# Patient Record
Sex: FEMALE | Race: WHITE | NOT HISPANIC OR LATINO | Employment: FULL TIME | URBAN - METROPOLITAN AREA
[De-identification: names, ages, dates, MRNs, and addresses within clinical notes are randomized per-mention and may not be internally consistent; named-entity substitution may affect disease eponyms.]

---

## 2017-02-01 ENCOUNTER — OFFICE VISIT (OUTPATIENT)
Dept: ONCOLOGY | Facility: CLINIC | Age: 32
End: 2017-02-01
Attending: GENETIC COUNSELOR, MS
Payer: COMMERCIAL

## 2017-02-01 DIAGNOSIS — Z80.3 FAMILY HISTORY OF MALIGNANT NEOPLASM OF BREAST: ICD-10-CM

## 2017-02-01 DIAGNOSIS — Z80.41 FAMILY HISTORY OF MALIGNANT NEOPLASM OF OVARY: Primary | ICD-10-CM

## 2017-02-01 PROCEDURE — 96040 ZZH GENETIC COUNSELING, EACH 30 MINUTES: CPT | Mod: ZF | Performed by: GENETIC COUNSELOR, MS

## 2017-02-01 NOTE — Clinical Note
Cancer Risk Management  Program Locations    [facility]  [facility]  Mailing Address  Cancer Risk Management Program  [facility]  [address] [place] [address]    New patient appointments  [phone]  February 2, 2017    Celeste Arguello  [address]  [facility] [address]    Dear Celeste,    It was a pleasure meeting with you at the [facility] on 2/1/17.  Here is a copy of the progress note from your recent genetic counseling visit to the Cancer Risk Management Program.  If you have any additional questions, please feel free to call.    Referring Provider:  ENRIQUE Guidry CNM     Presenting Information:   I met with Celeste Saundra today for genetic counseling at the Cancer Risk Management Program at the [facility] to discuss her family history of breast and ovarian cancer.  She is here today to discuss genetic testing for the BRCA1 mutation found in her cousin and other maternal family members.    Personal History:  Celeste is a 31 year old female.  She has not had cancer herself.  She has not had a mammogram. She did have a breast ultrasound in 2009 due to breast pain, but this was benign.  She had a transvaginal ultrasound in 2012 and 2015, which showed a left ovarian cyst.  She has not had a colonoscopy.  She quit smoking, and has only a few alcoholic drinks per year.    Age at first period: 11  Age at first child: 24  Age at menopause: n/a  Years of OCP use: 2   Total years of HRT use: none  Uterus/Ovaries/Fallopian tubes: intact    Family History: (Please see scanned pedigree for detailed family history information). Bao Ricketts, Genetic Counseling Intern, obtained the family tree under my supervision today.    8 year old healthy daughter.    Two brothers,  "ages 34 and 36, no cancer history.    Mother was diagnosed with ovarian cancer at 39, and had chemotherapy and surgery.  She passed away at 48.  Celeste's mother told her that she had genetic testing, and that the results were \"available when she was ready to use them\".  Celeste does not have her mother's test results.    Maternal aunt (had genetic counseling at [facility], Celeste brought the initial consult note) had a prophylactic double-mastectomy in her 40s, but then had Paget's disease of the nipple with IDC that was ER/FL-, HER2+.  Her daughters have had genetic testing for the BRCA1 familial mutation and are negative. This aunt has had genetic testing, but Celeste is not sure what her results were.    Maternal aunt had breast cancer at 43, and is BRCA1 positive. Her daughter also had breast cancer at 43, and is BRCA1 positive.    Maternal uncle  at 43 from pancreatic cancer, diagnosed around age 41.  He did not have genetic testing.  One of his daughters tested negative for the BRCA1 mutation, but his two other children have not been tested.    Two other maternal uncles and a maternal aunt have not had cancer or genetic testing.    Maternal grandmother has passed away, but Scott was not sure how old she was when she . She never had cancer. Her sister did have breast cancer, age of diagnosis is unknown.    Maternal grandfather had colon cancer at 72.    Father had leukemia and passed away at 51. His mother also had leukemia in her 50s.    Her maternal ethnicity is Polish. Her paternal ethnicity is Polish.  Ashkenazi Alevism ancestry was denied. No reported consanguinity.    Discussion:    We discussed that genetic testing for the BRCA1 mutation in her family is certainly available to Celeste. We discussed that mutations in this gene cause increased risk for breast, ovarian, and pancreatic cancer, which are all cancers we see in her family.  We discussed that since a mutation has been found, the " best testing strategy for Celeste is to test her for the specific BRCA1 mutation (single-site testing) in her family.  For this, we would need a positive test report from a family member, so we can verify the name of the mutation.  The genetic counseling note from her aunt that she brought today states that testing in her family members was done at [facility] in 2009, but the report was also not available at the time of her aunt's consultation.     Celeste believes that her mother did have genetic testing before she passed away, but does not have the test report. We discussed that her report would be very helpful to obtain.  If her mother is positive for the BRCA1 mutation, we could demonstrate that this was the reason she developed ovarian cancer, and could proceed with single-site testing for Celeste. However, if she had testing and was negative, we do not have a genetic explanation for her cancer, which could potentially be due to another genetic cause. In that case, more comprehensive testing would be indicated for Celeste.    I will find out from [employer] what information is required to obtain her mother's report. Celeste will contact her cousin and work to obtain a positive test report from another family member. We will be in contact in the near future once this information has been obtained.  Genetic testing will be deferred for now, until a positive test report becomes available.    Plan:  1) I will collect information on how to obtain Celeste's mother's records.  2) Celeste will contact her family members to obtain a genetic test report.   Face to face time: 20 mins    Dara Parra MS, [facility]  Certified Genetic Counselor  P. [phone], F. [phone]

## 2017-02-01 NOTE — Clinical Note
"2/1/2017       RE: Celeste Arguello  [address] [facility] [address]     Dear Colleague,    Thank you for referring your patient, Celeste Arguello, to the [facility]. Please see a copy of my visit note below.    2/1/2017    Referring Provider:  ENRIQUE Guidry CNM     Presenting Information:   I met with Celeste Arguello today for genetic counseling at the Cancer Risk Management Program at the [facility] to discuss her family history of breast and ovarian cancer.  She is here today to discuss genetic testing for the BRCA1 mutation found in her cousin and other maternal family members.    Personal History:  Celeste is a 31 year old female.  She has not had cancer herself.  She has not had a mammogram. She did have a breast ultrasound in 2009 due to breast pain, but this was benign.  She had a transvaginal ultrasound in 2012 and 2015, which showed a left ovarian cyst.  She has not had a colonoscopy.  She quit smoking, and has only a few alcoholic drinks per year.      Age at first period: 11  Age at first child: 24  Age at menopause: n/a  Years of OCP use: 2   Total years of HRT use: none  Uterus/Ovaries/Fallopian tubes: intact    Family History: (Please see scanned pedigree for detailed family history information). Bao Ricketts, Genetic Counseling Intern, obtained the family tree under my supervision today.    8 year old healthy daughter.    Two brothers, ages 34 and 36, no cancer history.    Mother was diagnosed with ovarian cancer at 39, and had chemotherapy and surgery.  She passed away at 48.  Celeste's mother told her that she had genetic testing, and that the results were \"available when she was ready to use them\".  Celeste does not have her mother's test results.    Maternal aunt (had genetic counseling at [facility], Celeste brought the initial consult note) had a prophylactic double-mastectomy in her 40s, but then had Paget's disease of the " nipple with IDC that was ER/DE-, HER2+.  Her daughters have had genetic testing for the BRCA1 familial mutation and are negative. This aunt has had genetic testing, but Celeste is not sure what her results were.    Maternal aunt had breast cancer at 43, and is BRCA1 positive. Her daughter also had breast cancer at 43, and is BRCA1 positive.    Maternal uncle  at 43 from pancreatic cancer, diagnosed around age 41.  He did not have genetic testing.  One of his daughters tested negative for the BRCA1 mutation, but his two other children have not been tested.    Two other maternal uncles and a maternal aunt have not had cancer or genetic testing.    Maternal grandmother has passed away, but Scott was not sure how old she was when she . She never had cancer. Her sister did have breast cancer, age of diagnosis is unknown.    Maternal grandfather had colon cancer at 72.    Father had leukemia and passed away at 51. His mother also had leukemia in her 50s.    Her maternal ethnicity is Polish. Her paternal ethnicity is Polish.  Ashkenazi Jain ancestry was denied. No reported consanguinity.    Discussion:    We discussed that genetic testing for the BRCA1 mutation in her family is certainly available to Celeste. We discussed that mutations in this gene cause increased risk for breast, ovarian, and pancreatic cancer, which are all cancers we see in her family.  We discussed that since a mutation has been found, the best testing strategy for Celeste is to test her for the specific BRCA1 mutation (single-site testing) in her family.  For this, we would need a positive test report from a family member, so we can verify the name of the mutation.  The genetic counseling note from her aunt that she brought today states that testing in her family members was done at [facility] in , but the report was also not available at the time of her aunt's consultation.     Celeste believes that her mother did have genetic  testing before she passed away, but does not have the test report. We discussed that her report would be very helpful to obtain.  If her mother is positive for the BRCA1 mutation, we could demonstrate that this was the reason she developed ovarian cancer, and could proceed with single-site testing for Celeste. However, if she had testing and was negative, we do not have a genetic explanation for her cancer, which could potentially be due to another genetic cause. In that case, more comprehensive testing would be indicated for Celeste.    I will find out from [employer] what information is required to obtain her mother's report. Celeste will contact her cousin and work to obtain a positive test report from another family member. We will be in contact in the near future once this information has been obtained.  Genetic testing will be deferred for now, until a positive test report becomes available.    Plan:  1) I will collect information on how to obtain Celeste's mother's records.  2) Celeste will contact her family members to obtain a genetic test report.     Face to face time: 20 mins    Dara Parra MS, [facility]  Certified Genetic Counselor  P. [phone]  F. [phone]

## 2017-02-01 NOTE — PROGRESS NOTES
"2/1/2017    Referring Provider:  ENRIQUE Guidry CNM     Presenting Information:   I met with Celeste Arguello today for genetic counseling at the Cancer Risk Management Program at the [facility] to discuss her family history of breast and ovarian cancer.  She is here today to discuss genetic testing for the BRCA1 mutation found in her cousin and other maternal family members.    Personal History:  Celetse is a 31 year old female.  She has not had cancer herself.  She has not had a mammogram. She did have a breast ultrasound in 2009 due to breast pain, but this was benign.  She had a transvaginal ultrasound in 2012 and 2015, which showed a left ovarian cyst.  She has not had a colonoscopy.  She quit smoking, and has only a few alcoholic drinks per year.      Age at first period: 11  Age at first child: 24  Age at menopause: n/a  Years of OCP use: 2   Total years of HRT use: none  Uterus/Ovaries/Fallopian tubes: intact    Family History: (Please see scanned pedigree for detailed family history information). Bao Ricketts, Genetic Counseling Intern, obtained the family tree under my supervision today.    8 year old healthy daughter.    Two brothers, ages 34 and 36, no cancer history.    Mother was diagnosed with ovarian cancer at 39, and had chemotherapy and surgery.  She passed away at 48.  Celeste's mother told her that she had genetic testing, and that the results were \"available when she was ready to use them\".  Celeste does not have her mother's test results.    Maternal aunt (had genetic counseling at [facility], Celeste brought the initial consult note) had a prophylactic double-mastectomy in her 40s, but then had Paget's disease of the nipple with IDC that was ER/MS-, HER2+.  Her daughters have had genetic testing for the BRCA1 familial mutation and are negative. This aunt has had genetic testing, but Celeste is not sure what her results were.    Maternal aunt had breast cancer at " 43, and is BRCA1 positive. Her daughter also had breast cancer at 43, and is BRCA1 positive.    Maternal uncle  at 43 from pancreatic cancer, diagnosed around age 41.  He did not have genetic testing.  One of his daughters tested negative for the BRCA1 mutation, but his two other children have not been tested.    Two other maternal uncles and a maternal aunt have not had cancer or genetic testing.    Maternal grandmother has passed away, but Scott was not sure how old she was when she . She never had cancer. Her sister did have breast cancer, age of diagnosis is unknown.    Maternal grandfather had colon cancer at 72.    Father had leukemia and passed away at 51. His mother also had leukemia in her 50s.    Her maternal ethnicity is Polish. Her paternal ethnicity is Polish.  Ashkenazi Orthodox ancestry was denied. No reported consanguinity.    Discussion:    We discussed that genetic testing for the BRCA1 mutation in her family is certainly available to Celeste. We discussed that mutations in this gene cause increased risk for breast, ovarian, and pancreatic cancer, which are all cancers we see in her family.  We discussed that since a mutation has been found, the best testing strategy for Celeste is to test her for the specific BRCA1 mutation (single-site testing) in her family.  For this, we would need a positive test report from a family member, so we can verify the name of the mutation.  The genetic counseling note from her aunt that she brought today states that testing in her family members was done at [facility] in , but the report was also not available at the time of her aunt's consultation.     Celeste believes that her mother did have genetic testing before she passed away, but does not have the test report. We discussed that her report would be very helpful to obtain.  If her mother is positive for the BRCA1 mutation, we could demonstrate that this was the reason she developed ovarian  cancer, and could proceed with single-site testing for Celeste. However, if she had testing and was negative, we do not have a genetic explanation for her cancer, which could potentially be due to another genetic cause. In that case, more comprehensive testing would be indicated for Celeste.    I will find out from [employer] what information is required to obtain her mother's report. Celeste will contact her cousin and work to obtain a positive test report from another family member. We will be in contact in the near future once this information has been obtained.  Genetic testing will be deferred for now, until a positive test report becomes available.    Plan:  1) I will collect information on how to obtain Celeste's mother's records.  2) Celeste will contact her family members to obtain a genetic test report.     Face to face time: 20 mins    Dara Parra MS, [facility]  Certified Genetic Counselor  P. [phone]  F. [phone]

## 2017-02-17 ENCOUNTER — TELEPHONE (OUTPATIENT)
Dept: ONCOLOGY | Facility: CLINIC | Age: 32
End: 2017-02-17

## 2017-02-17 NOTE — TELEPHONE ENCOUNTER
2/17/2017    I called Celeste today to follow up per our last discussion (trying to obtain her mother's test report), but was unable to reach her.  I left a non-detailed voicemail with my name and phone number.    Dara Parra MS, [facility]  Certified Genetic Counselor  P. [phone]  F. [phone]

## 2017-02-22 ENCOUNTER — TELEPHONE (OUTPATIENT)
Dept: ONCOLOGY | Facility: CLINIC | Age: 32
End: 2017-02-22

## 2017-02-22 DIAGNOSIS — Z30.41 ORAL CONTRACEPTIVE USE: ICD-10-CM

## 2017-02-22 DIAGNOSIS — Z80.41 FAMILY HISTORY OF OVARIAN CANCER: ICD-10-CM

## 2017-02-22 DIAGNOSIS — N80.9 ENDOMETRIOSIS: ICD-10-CM

## 2017-02-22 RX ORDER — NORGESTIMATE AND ETHINYL ESTRADIOL 0.25-0.035
KIT ORAL
Qty: 84 TABLET | Refills: 4 | Status: SHIPPED | OUTPATIENT
Start: 2017-02-22 | End: 2018-04-04

## 2017-02-22 NOTE — TELEPHONE ENCOUNTER
Refill request received for OCP . Her last clinic appointment was 2/17/16 and pap was 6/16/14. Refill completed per [facility] RN protocol.

## 2017-02-22 NOTE — TELEPHONE ENCOUNTER
2/22/2017    I called Celeste today to follow up on our previous discussion about getting a positive test report from a family member, but was unable to reach her.  I left a non-detailed voicemail with my name and phone number.    Dara Parra MS, [facility]  Certified Genetic Counselor  P. [phone]  F. [phone]

## 2017-02-23 ENCOUNTER — TELEPHONE (OUTPATIENT)
Dept: ONCOLOGY | Facility: CLINIC | Age: 32
End: 2017-02-23

## 2017-02-23 NOTE — LETTER
Cancer Risk Management  Program Locations    [facility]  [facility]  [facility]  Mailing Address  Cancer Risk Management Program  [facility]  [address] [place] [address]    New patient appointments  [phone]  February 23, 2017    Celeste Arguello  [address]  [address]  [facility] [address]      Dear Klaudia,    It was good to talk to you the other day on the phone.  Here is the paperwork for you uncle (executor of your mother's estate) to fill out.     I can obtain the records for you once we have the following information:  1. Attached form, signed by your uncle  2. Proof that he is executor of your mother's estate  3. A copy of your mother's death certificate.    Once you have this information, you can mail it back to me at the following address:  Dara Parra  [address] [place] [address]  [address] [place] [id number]    Please let me know if you have questions.  My office phone number is [phone].    Sincerely,        Dara Parra MS, [facility]  Certified Genetic Counselor  P. [phone]  F. [phone]

## 2017-03-16 ENCOUNTER — OFFICE VISIT (OUTPATIENT)
Dept: FAMILY MEDICINE | Facility: CLINIC | Age: 32
End: 2017-03-16
Payer: COMMERCIAL

## 2017-03-16 VITALS
SYSTOLIC BLOOD PRESSURE: 113 MMHG | HEART RATE: 65 BPM | DIASTOLIC BLOOD PRESSURE: 71 MMHG | TEMPERATURE: 98.5 F | HEIGHT: 62 IN | BODY MASS INDEX: 21.71 KG/M2 | OXYGEN SATURATION: 96 % | WEIGHT: 118 LBS

## 2017-03-16 DIAGNOSIS — H81.10 BPPV (BENIGN PAROXYSMAL POSITIONAL VERTIGO), UNSPECIFIED LATERALITY: Primary | ICD-10-CM

## 2017-03-16 DIAGNOSIS — Z72.0 TOBACCO ABUSE: ICD-10-CM

## 2017-03-16 PROCEDURE — 99213 OFFICE O/P EST LOW 20 MIN: CPT | Performed by: NURSE PRACTITIONER

## 2017-03-16 RX ORDER — MECLIZINE HYDROCHLORIDE 25 MG/1
25 TABLET ORAL 3 TIMES DAILY PRN
COMMUNITY
Start: 2017-03-13 | End: 2019-03-25

## 2017-03-16 RX ORDER — VARENICLINE TARTRATE 1 MG/1
1 TABLET, FILM COATED ORAL 2 TIMES DAILY
Qty: 56 TABLET | Refills: 2 | Status: SHIPPED | OUTPATIENT
Start: 2017-03-16 | End: 2018-04-26

## 2017-03-16 RX ORDER — ONDANSETRON 4 MG/1
4 TABLET, ORALLY DISINTEGRATING ORAL EVERY 12 HOURS PRN
COMMUNITY
Start: 2017-03-13 | End: 2019-03-25

## 2017-03-16 ASSESSMENT — ANXIETY QUESTIONNAIRES
2. NOT BEING ABLE TO STOP OR CONTROL WORRYING: NOT AT ALL
GAD7 TOTAL SCORE: 2
IF YOU CHECKED OFF ANY PROBLEMS ON THIS QUESTIONNAIRE, HOW DIFFICULT HAVE THESE PROBLEMS MADE IT FOR YOU TO DO YOUR WORK, TAKE CARE OF THINGS AT HOME, OR GET ALONG WITH OTHER PEOPLE: NOT DIFFICULT AT ALL
6. BECOMING EASILY ANNOYED OR IRRITABLE: SEVERAL DAYS
7. FEELING AFRAID AS IF SOMETHING AWFUL MIGHT HAPPEN: NOT AT ALL
5. BEING SO RESTLESS THAT IT IS HARD TO SIT STILL: SEVERAL DAYS
3. WORRYING TOO MUCH ABOUT DIFFERENT THINGS: NOT AT ALL
1. FEELING NERVOUS, ANXIOUS, OR ON EDGE: NOT AT ALL

## 2017-03-16 ASSESSMENT — PATIENT HEALTH QUESTIONNAIRE - PHQ9: 5. POOR APPETITE OR OVEREATING: NOT AT ALL

## 2017-03-16 NOTE — MR AVS SNAPSHOT
After Visit Summary   3/16/2017    Celeste Arguello    MRN: [id number]           Patient Information     Date Of Birth          1985        Visit Information        Provider Department      3/16/2017 9:40 AM Carolyn San APRN CNP [facility]        Today's Diagnoses     BPPV (benign paroxysmal positional vertigo), unspecified laterality    -  1    Tobacco abuse          Care Instructions    [facility]    If you have any questions regarding to your visit please contact your care team:       Team Red:   Clinic Hours Telephone Number   Dr. Sarah San, NP   7am-7pm  Monday - Thursday   7am-5pm  Fridays  [phone]  (Appointment scheduling available 24/7)    Questions about your visit?   Team Line  [phone]   Urgent Care - [place] [facility] - 11am-9pm Monday-Friday Saturday-Sunday- 9am-5pm   [place] - 5pm-9pm Monday-Friday Saturday-Sunday- 9am-5pm  [phone] - [facility]  [phone] - [place]       What options do I have for visits at the clinic other than the traditional office visit?  To expand how we care for you, many of our providers are utilizing electronic visits (e-visits) and telephone visits, when medically appropriate, for interactions with their patients rather than a visit in the clinic.   We also offer nurse visits for many medical concerns. Just like any other service, we will bill your insurance company for this type of visit based on time spent on the phone with your provider. Not all insurance companies cover these visits. Please check with your medical insurance if this type of visit is covered. You will be responsible for any charges that are not paid by your insurance.      E-visits via FUZE Fit For A Kid!:  generally incur a $35.00 fee.  Telephone visits:  Time spent on the phone: *charged based on time that is spent on the phone in increments of 10 minutes.  Estimated cost:   5-10 mins $30.00   11-20 mins. $59.00   21-30 mins. $85.00     Use KartMehart (secure email communication and access to your chart) to send your primary care provider a message or make an appointment. Ask someone on your Team how to sign up for [employer].  For a Price Quote for your services, please call our [employer] Price Line at [phone].      As always, Thank you for trusting us with your health care needs!    Discharged by Anel Li MA.          Follow-ups after your visit        Additional Services     [facility] PT, HAND, AND CHIROPRACTIC REFERRAL       === This order will print in the [facility] Scheduling  Office ===    Physical therapy, hand therapy and chiropractic care are available through:    [facility]  [place] [facility]    Call one easy number to schedule at any of the above locations:  [phone].    Your provider has referred you to Physical Therapy at [facility] or [facility]    Indication/Reason for Referral: BPPV  Onset of Illness:  acute  Therapy Orders:  Evaluate and Treat  Special Programs:  None  Special Request:  None    Taylor Vazquez      Additional Comments for the therapist or chiropractor:      Please be aware that coverage of these services is subject to the terms and limitations of your health insurance plan.  Call member services at your health plan with any benefit or coverage questions.      Please bring the following to your appointment:    >>   Your personal calendar for scheduling future appointments  >>   Comfortable clothing                  Who to contact     If you have questions or need follow up information about today's clinic visit or your schedule please contact [facility] directly at [phone].  Normal or non-critical lab and imaging results will be communicated to you by MyChart, letter or phone within 4 business days after the clinic has received the results. If you do not hear from us within  "7 days, please contact the clinic through Social Tools or phone. If you have a critical or abnormal lab result, we will notify you by phone as soon as possible.  Submit refill requests through Social Tools or call your pharmacy and they will forward the refill request to us. Please allow 3 business days for your refill to be completed.          Additional Information About Your Visit        [employer] Information     Social Tools gives you secure access to your electronic health record. If you see a primary care provider, you can also send messages to your care team and make appointments. If you have questions, please call your primary care clinic.  If you do not have a primary care provider, please call [phone] and they will assist you.        Care EveryWhere ID     This is your Care EveryWhere ID. This could be used by other organizations to access your [place] medical records  [id number]        Your Vitals Were     Pulse Temperature Height Pulse Oximetry Breastfeeding? BMI (Body Mass Index)    65 98.5  F (36.9  C) (Oral) 5' 2\" (1.575 m) 96% No 21.58 kg/m2       Blood Pressure from Last 3 Encounters:   03/16/17 113/71   12/22/16 117/77   10/26/16 108/63    Weight from Last 3 Encounters:   03/16/17 118 lb (53.5 kg)   12/22/16 115 lb 9.6 oz (52.4 kg)   10/26/16 115 lb (52.2 kg)              We Performed the Following     [facility] PT, HAND, AND CHIROPRACTIC REFERRAL          Today's Medication Changes          These changes are accurate as of: 3/16/17 10:26 AM.  If you have any questions, ask your nurse or doctor.               Start taking these medicines.        Dose/Directions    varenicline 1 MG tablet   Commonly known as:  CHANTIX   Used for:  Tobacco abuse   Started by:  Carolyn San APRN CNP        Dose:  1 mg   Take 1 tablet (1 mg) by mouth 2 times daily   Quantity:  56 tablet   Refills:  2            Where to get your medicines      These medications were sent to [facility] [id number] [place], [place] - [address] [facility]  [address] [address]     Phone:  [phone]     varenicline 1 MG tablet                Primary Care Provider Office Phone # Fax #    ENRIQUE Zavala FERN [phone]       [facility] [address]  [facility] [address]        Thank you!     Thank you for choosing [facility]  for your care. Our goal is always to provide you with excellent care. Hearing back from our patients is one way we can continue to improve our services. Please take a few minutes to complete the written survey that you may receive in the mail after your visit with us. Thank you!             Your Updated Medication List - Protect others around you: Learn how to safely use, store and throw away your medicines at www.disposemymeds.org.          This list is accurate as of: 3/16/17 10:26 AM.  Always use your most recent med list.                   Brand Name Dispense Instructions for use    citalopram 20 MG tablet    celeXA    90 tablet    Take 1 tablet (20 mg) by mouth daily       meclizine 25 MG tablet    ANTIVERT     Take 25 mg by mouth 3 times daily as needed       MIRENA (52 MG) 20 MCG/24HR IUD   Generic drug:  levonorgestrel      1 Device by Intrauterine route Was inserted S 2-       norgestimate-ethinyl estradiol 0.25-35 MG-MCG per tablet    ORTHO-CYCLEN, SPRINTEC    84 tablet    Take active pill daily.       ondansetron 4 MG ODT tab    ZOFRAN-ODT     Take 4 mg by mouth every 12 hours       varenicline 1 MG tablet    CHANTIX    56 tablet    Take 1 tablet (1 mg) by mouth 2 times daily

## 2017-03-16 NOTE — NURSING NOTE
"Chief Complaint   Patient presents with     Hospital F/U       Initial /71 (BP Location: Left arm, Patient Position: Chair, Cuff Size: Adult Regular)  Pulse 65  Temp 98.5  F (36.9  C) (Oral)  Ht 5' 2\" (1.575 m)  Wt 118 lb (53.5 kg)  SpO2 96%  Breastfeeding? No  BMI 21.58 kg/m2 Estimated body mass index is 21.58 kg/(m^2) as calculated from the following:    Height as of this encounter: 5' 2\" (1.575 m).    Weight as of this encounter: 118 lb (53.5 kg).  Medication Reconciliation: complete   Mary EDEN MA      "

## 2017-03-16 NOTE — PATIENT INSTRUCTIONS
[facility]    If you have any questions regarding to your visit please contact your care team:       Team Red:   Clinic Hours Telephone Number   Dr. Sarah San, NP   7am-7pm  Monday - Thursday   7am-5pm  Fridays  [phone]  (Appointment scheduling available 24/7)    Questions about your visit?   Team Line  [phone]   [facility] [place] [facility] 11am-9pm Monday-Friday Saturday-Sunday- 9am-5pm   [place] - 5pm-9pm Monday-Friday Saturday-Sunday- 9am-5pm  [phone] - [place]   [phone] - [place]       What options do I have for visits at the clinic other than the traditional office visit?  To expand how we care for you, many of our providers are utilizing electronic visits (e-visits) and telephone visits, when medically appropriate, for interactions with their patients rather than a visit in the clinic.   We also offer nurse visits for many medical concerns. Just like any other service, we will bill your insurance company for this type of visit based on time spent on the phone with your provider. Not all insurance companies cover these visits. Please check with your medical insurance if this type of visit is covered. You will be responsible for any charges that are not paid by your insurance.      E-visits via fitaborate:  generally incur a $35.00 fee.  Telephone visits:  Time spent on the phone: *charged based on time that is spent on the phone in increments of 10 minutes. Estimated cost:   5-10 mins $30.00   11-20 mins. $59.00   21-30 mins. $85.00     Use "[employer]t (secure email communication and access to your chart) to send your primary care provider a message or make an appointment. Ask someone on your Team how to sign up for fitaborate.  For a Price Quote for your services, please call our Consumer Price Line at [phone].      As always, Thank you for trusting us with your health care needs!    Discharged  by Anel Li MA.

## 2017-03-16 NOTE — PROGRESS NOTES
SUBJECTIVE:                                                    Celeste Arguello is a 31 year old female who presents to clinic today for the following health issues:      ED/UC Followup:    Facility:  [facility]  Date of visit: 3-12-17  Reason for visit: Vertigo  Current Status: improved     Had an episode of dizziness and vomiting in the shower, went to Emergency Room and diagnosed with vertigo. Has had some dizziness since then- worse in shower and with quick head motions- but much better overall. Has taken Meclizine with some relief- didn't take today and feels more dizzy.    Problem list and histories reviewed & adjusted, as indicated.  Additional history: as documented    Patient Active Problem List   Diagnosis     Mother  of ovarian cancer, age 47     Vitamin d deficiency 23     Female genital symptoms     Encounter for routine gynecological examination     Endometriosis     Mirena IUD (intrauterine device) in place     Oral contraceptive use -- for cyst control     Past Surgical History   Procedure Laterality Date     Urethral reimplant  age 6     Laparoscopy diagnostic (gyn)       endometriosis       Social History   Substance Use Topics     Smoking status: Former Smoker     Quit date: 3/11/2017     Smokeless tobacco: Never Used      Comment: quit for 2 months now     Alcohol use Yes      Comment: 1wine 1x/yr max     Family History   Problem Relation Age of Onset     Ovarian Cancer Mother 38      age 47     Hereditary Breast and Ovarian Cancer Syndrome Maternal Aunt 50     Breast cancer gene -- MEHRDAD/BSO and mastectomy     Pancreatic Cancer Maternal Uncle 38     Alcohol/Drug Brother      active     Alcohol/Drug Brother      recovering     Psychotic Disorder Brother      depression and ADHD     Breast Cancer Maternal Aunt 35     & MC age 36     Uterine Cancer Maternal Aunt      C.A.D. No family hx of      DIABETES No family hx of      Hypertension No family hx of      Coronary Artery  "Disease No family hx of      CEREBROVASCULAR DISEASE No family hx of      Hyperlipidemia No family hx of      Colon Cancer No family hx of      Prostate Cancer No family hx of      Thyroid Disease No family hx of            Reviewed and updated as needed this visit by clinical staff  Tobacco  Allergies  Meds  Med Hx  Surg Hx  Fam Hx  Soc Hx      Reviewed and updated as needed this visit by Provider         ROS:  Constitutional, HEENT, cardiovascular, pulmonary, neuro systems are negative, except as otherwise noted.    OBJECTIVE:                                                    /71 (BP Location: Left arm, Patient Position: Chair, Cuff Size: Adult Regular)  Pulse 65  Temp 98.5  F (36.9  C) (Oral)  Ht 5' 2\" (1.575 m)  Wt 118 lb (53.5 kg)  SpO2 96%  Breastfeeding? No  BMI 21.58 kg/m2  Body mass index is 21.58 kg/(m^2).  GENERAL: healthy, alert and no distress  EYES: Eyes grossly normal to inspection, PERRL and conjunctivae and sclerae normal  HENT: ear canals and TM's normal, nose and mouth without ulcers or lesions  NECK: no adenopathy, no asymmetry, masses, or scars and thyroid normal to palpation  RESP: lungs clear to auscultation - no rales, rhonchi or wheezes  CV: regular rates and rhythm, normal S1 S2, no S3 or S4 and no murmur, click or rub  NEURO: Normal strength and tone, sensory exam grossly normal, mentation intact, gait normal including heel/toe/tandem walking and Romberg normal    Diagnostic Test Results:  none      ASSESSMENT/PLAN:                                                      1. BPPV (benign paroxysmal positional vertigo), unspecified laterality  Continue Meclizine, see Physical Therapy for Epley maneuvers  - [facility] PT, HAND, AND CHIROPRACTIC REFERRAL    2. Tobacco abuse  Had quit for several months then started again- Chantix helpful and has been taking for the last week or so. Rx refilled today.  - varenicline (CHANTIX) 1 MG tablet; Take 1 tablet (1 mg) by mouth 2 times daily  " Dispense: 56 tablet; Refill: 2    Follow up as needed    ENRIQUE Renae CNP  [facility]

## 2017-03-17 ENCOUNTER — THERAPY VISIT (OUTPATIENT)
Dept: PHYSICAL THERAPY | Facility: CLINIC | Age: 32
End: 2017-03-17
Payer: COMMERCIAL

## 2017-03-17 DIAGNOSIS — H81.10 BPPV (BENIGN PAROXYSMAL POSITIONAL VERTIGO), UNSPECIFIED LATERALITY: Primary | ICD-10-CM

## 2017-03-17 PROCEDURE — 97112 NEUROMUSCULAR REEDUCATION: CPT | Mod: GP | Performed by: PHYSICAL THERAPIST

## 2017-03-17 PROCEDURE — 97161 PT EVAL LOW COMPLEX 20 MIN: CPT | Mod: GP | Performed by: PHYSICAL THERAPIST

## 2017-03-17 ASSESSMENT — PATIENT HEALTH QUESTIONNAIRE - PHQ9: SUM OF ALL RESPONSES TO PHQ QUESTIONS 1-9: 2

## 2017-03-17 ASSESSMENT — ANXIETY QUESTIONNAIRES: GAD7 TOTAL SCORE: 2

## 2017-03-17 NOTE — PROGRESS NOTES
Subjective:    HPI                    Objective:    System    Physical Exam    General     ROS  S:  Celeste is a 31 year old patient complaining of  dizziness.  Onset of symptoms: 5 days ago 3/11      Is this a recurrent problem: yes dont feel right   Progression since onset: same   Frequency of episodes/time of day: few   Duration of episodes: brief   Exacerbating factors: looking down, rolling over in bed   Relieving factors: rest/stillness   Previous treatments:  None   Special tests/diagnostics performed: none   Significant medical hx: motion sickness all of her life   Meds: none   Occupation: mental health therapist    Work requirements: none   General health reported by patient: good   Red Flags: none       O:  Cervical ROM screen: neg   Oculomotor/gaze stability screen: +saccodes   Vertebral artery test: neg   Hallpike-Plymouth maneuver:  R: neg   L: neg   Horizontal canal test:  R: neg   L: neg   Balance testing:  NA       Assessment/Plan:      Patient is a 31 year old female with vestibular complaints.    Patient has the following significant findings with corresponding treatment plan.                Diagnosis 1:  BPPV  Impaired balance - neuro re-education, therapeutic activities and CRM     Therapy Evaluation Codes:   1) History comprised of:   Personal factors that impact the plan of care:      None.    Comorbidity factors that impact the plan of care are:      None.     Medications impacting care: None.  2) Examination of Body Systems comprised of:   Body structures and functions that impact the plan of care:      vestibular .   Activity limitations that impact the plan of care are:      Bending.  3) Clinical presentation characteristics are:   Stable/Uncomplicated.  4) Decision-Making    Low complexity using standardized patient assessment instrument and/or measureable assessment of functional outcome.  Cumulative Therapy Evaluation is: Low complexity.    Previous and current functional limitations:  (See  Goal Flow Sheet for this information)    Short term and Long term goals: (See Goal Flow Sheet for this information)     Communication ability:  Patient appears to be able to clearly communicate and understand verbal and written communication and follow directions correctly.  Treatment Explanation - The following has been discussed with the patient:   RX ordered/plan of care  Anticipated outcomes  Possible risks and side effects  This patient would benefit from PT intervention to resume normal activities.   Rehab potential is good.    Frequency:  1 X week, once daily  Duration:  for 4 weeks  Discharge Plan:  Achieve all LTG.  Independent in home treatment program.  Reach maximal therapeutic benefit.    Please refer to the daily flowsheet for treatment today, total treatment time and time spent performing 1:1 timed codes.

## 2017-03-17 NOTE — MR AVS SNAPSHOT
After Visit Summary   3/17/2017    Celeste Arguello    MRN: [id number]           Patient Information     Date Of Birth          1985        Visit Information        Provider Department      3/17/2017 3:20 PM Aquilino Simon PT [facility]        Today's Diagnoses     BPPV (benign paroxysmal positional vertigo), unspecified laterality    -  1       Follow-ups after your visit        Who to contact     If you have questions or need follow up information about today's clinic visit or your schedule please contact [facility] directly at [phone].  Normal or non-critical lab and imaging results will be communicated to you by Michael Biekerhart, letter or phone within 4 business days after the clinic has received the results. If you do not hear from us within 7 days, please contact the clinic through Michael Biekerhart or phone. If you have a critical or abnormal lab result, we will notify you by phone as soon as possible.  Submit refill requests through POTATOSOFT or call your pharmacy and they will forward the refill request to us. Please allow 3 business days for your refill to be completed.          Additional Information About Your Visit        MyChart Information     POTATOSOFT gives you secure access to your electronic health record. If you see a primary care provider, you can also send messages to your care team and make appointments. If you have questions, please call your primary care clinic.  If you do not have a primary care provider, please call [phone] and they will assist you.        Care EveryWhere ID     This is your Care EveryWhere ID. This could be used by other organizations to access your [facility] medical records  [id number]         Blood Pressure from Last 3 Encounters:   03/16/17 113/71   12/22/16 117/77   10/26/16 108/63    Weight from Last 3 Encounters:   03/16/17 53.5 kg (118 lb)   12/22/16 52.4 kg (115 lb 9.6 oz)   10/26/16 52.2 kg (115 lb)              We Performed the  Following     HC PT EVAL, LOW COMPLEXITY     POOL INITIAL EVAL REPORT     NEUROMUSCULAR RE-EDUCATION        Primary Care Provider Office Phone # Fax #    ENRIQUE Zavala FERN [phone]       [facility] [address] [facility] [address]  [facility] [address]        Thank you!     Thank you for choosing [facility]  for your care. Our goal is always to provide you with excellent care. Hearing back from our patients is one way we can continue to improve our services. Please take a few minutes to complete the written survey that you may receive in the mail after your visit with us. Thank you!             Your Updated Medication List - Protect others around you: Learn how to safely use, store and throw away your medicines at www.disposemymeds.org.          This list is accurate as of: 3/17/17  4:42 PM.  Always use your most recent med list.                   Brand Name Dispense Instructions for use    citalopram 20 MG tablet    celeXA    90 tablet    Take 1 tablet (20 mg) by mouth daily       meclizine 25 MG tablet    ANTIVERT     Take 25 mg by mouth 3 times daily as needed       MIRENA (52 MG) 20 MCG/24HR IUD   Generic drug:  levonorgestrel      1 Device by Intrauterine route Was inserted [employer]  2-       norgestimate-ethinyl estradiol 0.25-35 MG-MCG per tablet    ORTHO-CYCLEN, SPRINTEC    84 tablet    Take active pill daily.       ondansetron 4 MG ODT tab    ZOFRAN-ODT     Take 4 mg by mouth every 12 hours       varenicline 1 MG tablet    CHANTIX    56 tablet    Take 1 tablet (1 mg) by mouth 2 times daily

## 2017-03-23 ENCOUNTER — TELEPHONE (OUTPATIENT)
Dept: ONCOLOGY | Facility: CLINIC | Age: 32
End: 2017-03-23

## 2017-03-23 NOTE — TELEPHONE ENCOUNTER
3/23/2017    I called Celeste today to find out if she has talked to her uncle about singing a release to obtain her mother's test report, but was unable to reach her.  I left a non-detailed voicemail with my name and phone number.    Dara Parra MS, [facility]  Certified Genetic Counselor  P. [phone]  F. [phone]

## 2017-04-11 ENCOUNTER — TELEPHONE (OUTPATIENT)
Dept: ONCOLOGY | Facility: CLINIC | Age: 32
End: 2017-04-11

## 2017-04-11 NOTE — TELEPHONE ENCOUNTER
4/11/2017    I called Celeste today to find out if she was able to get the release signed by her uncle to obtain her mother's test report, but was unable to reach her.  I left a non-detailed voicemail with my name and phone number.    Dara Parra MS, [facility]  Certified Genetic Counselor  P. [phone]  F. [phone]

## 2017-04-12 ENCOUNTER — TELEPHONE (OUTPATIENT)
Dept: ONCOLOGY | Facility: CLINIC | Age: 32
End: 2017-04-12

## 2017-04-12 NOTE — TELEPHONE ENCOUNTER
4/12/2017    Klaudia called back today, and she got her uncle (who lives in [place] seasonally) to sign the paperwork to release her mother's records. However, paperwork showing that he is the executor of her mother's estate is also required. The paperwork is at her uncle's permanent residence in [place], and he will be moving back for the summer months in May. Klaudia will also check some records that she has at home, and will let me know if she is able to find anything. Her uncle mailed me the release.  We will be in touch in a few weeks.    Dara Parra MS, [facility]  Certified Genetic Counselor  P. [phone]  F. [phone]

## 2017-05-10 ENCOUNTER — TELEPHONE (OUTPATIENT)
Dept: ONCOLOGY | Facility: CLINIC | Age: 32
End: 2017-05-10

## 2017-05-10 NOTE — TELEPHONE ENCOUNTER
5/10/2017    I called Celeste today to find out if she had received anything from her uncle, as I have not received anything by mail from him yet, but was unable to reach her.  I left a voicemail with my name and phone number.    Dara Parra MS, [facility]  Certified Genetic Counselor  P. [phone]  F. [phone]

## 2017-05-18 ENCOUNTER — TELEPHONE (OUTPATIENT)
Dept: ONCOLOGY | Facility: CLINIC | Age: 32
End: 2017-05-18

## 2017-05-18 NOTE — TELEPHONE ENCOUNTER
5/18/2017    I called Celeste again today to find out if she had received anything from her uncle, as I have not received anything by mail from him yet, but was unable to reach her. I left a voicemail with my name and phone number.     Dara Parra MS, [facility]  Certified Genetic Counselor  P. [phone]  F. [phone]

## 2017-05-18 NOTE — TELEPHONE ENCOUNTER
5/18/2017    I called Celeste today to let her know that I just received the documentation from her uncle by mail, but was unable to reach her.  I left a voicemail letting her know I got this information, and left my name and phone number.    Dara Parra MS, [facility]  Certified Genetic Counselor  P. [phone]  F. [phone]

## 2017-06-08 ENCOUNTER — TELEPHONE (OUTPATIENT)
Dept: ONCOLOGY | Facility: CLINIC | Age: 32
End: 2017-06-08

## 2017-06-08 NOTE — TELEPHONE ENCOUNTER
6/8/2017    I called Celeste today to let her know that I heard back from [facility] after submitting the release of information, and her mother was never tested there.  She hasn't had good luck getting a test report from other relatives due to family dynamics.  We discussed that I am happy to see her in clinic for testing anyway, and that I will have the schedulers call her to set up an appointment.    Dara Parra MS, [facility] [place]  Certified Genetic Counselor  P. [phone]  F. [phone]

## 2017-07-20 ENCOUNTER — DOCUMENTATION ONLY (OUTPATIENT)
Dept: ONCOLOGY | Facility: CLINIC | Age: 32
End: 2017-07-20

## 2017-07-20 NOTE — LETTER
Cancer Risk Management  Program Locations    [facility]  [facility]  [facility]  Mailing Address  Cancer Risk Management Program  [facility]  [address] [place] [address]    New patient appointments  [phone]  July 20, 2017    Celeste Arguello  [address]  [address]  [facility] [place] [address]      Dear Celeste,    I hope you are well. Unfortunately, our schedulers have been unable to reach you to schedule another appointment with me. I know that we all have busy times in our lives, and I can understand if now is not a good time for you to pursue genetic counseling.    However, I do want to make sure you are getting the best medical care possible.  I would encourage you to discuss your family history of cancer with your primary physician.  I have also put a list of screening recommendations based on your family history of cancer. Certainly, these recommendations may change with time, or if you undergo genetic testing.    Screening Recommendations:    Due to your family history of ovarian cancer, you remain at slightly increased risk for ovarian cancer.  There is available ovarian cancer screening (pelvic exams, CA-125 blood tests, and transvaginal ultrasounds), however this screening has significant limitations.  As such, this screening is not typically recommended.  However, I would encourage you to discuss this screening and the signs and symptoms of ovarian cancer with your primary OB/GYN provider, as he or she may have individualized recommendations for you.    Based on your family history, you have a 31.8% lifetime risk of developing breast cancer based on the DAPHNIE (v8) model.  As such, you meet current National Comprehensive Cancer Network (NCCN) guidelines for high risk breast screening.      This includes annual  breast MRI in addition to annual mammogram beginning at age 33 (10 years before the earliest diagnosis in your family).      In addition, you should be receiving clinical breast exams by your physician.      You may participate in our Cancer Risk Management Program in which our clinical nurse specialist provides an individual screening plan and assists with medical management.  If you are interested in this, I can place a referral for you.  Additionally, I encourage to share this information with your primary care provider    Other population cancer screening, such as recommendations by the American Cancer Society, are also appropriate for you.  These screening recommendations may change if there are changes to your personal and/or family history, or if you undergo genetic testing.  Final screening recommendations should be made by each individual's managing physician.    Please let me know if you have any questions. I can be reached at [phone].  If you would like to schedule an appointment with me, please call [phone].     I hope to hear from you soon,     Dara Parra MS, [facility] [place]   Certified Genetic Counselor   P. [phone]

## 2017-07-20 NOTE — PROGRESS NOTES
7/20/2017    Letter sent to patient with instructions to reschedule, which also includes screening recommendations. See letters tab, letter dated 7/20/2017    Dara Parra MS, [facility] [place]  Certified Genetic Counselor  P. [phone]  F. [phone]

## 2017-07-20 NOTE — PROGRESS NOTES
7/20/2017    We have been unable to reach Celeste on multiple accounts to schedule a follow-up appointment. Letter sent to patient with screening recommendations and instructions to reschedule. See letters tab dated 7/20/2017.    Dara Parra MS, [facility] [place]  Certified Genetic Counselor  P. [phone]  F. [phone]

## 2017-08-28 PROBLEM — H81.10 BPPV (BENIGN PAROXYSMAL POSITIONAL VERTIGO), UNSPECIFIED LATERALITY: Status: RESOLVED | Noted: 2017-03-17 | Resolved: 2017-08-28

## 2017-08-28 NOTE — PROGRESS NOTES
Subjective:    HPI                    Objective:    System    Physical Exam    General     ROS    Assessment/Plan:      DISCHARGE REPORT    Progress reporting period is from 3.17 to 8.25.17.     SUBJECTIVE  Subjective: feel not right                ;   ,     Patient has failed to return to therapy so current objective findings are unknown.    OBJECTIVE  Objective: +saccodes, Neg HPD neg Roll bilat       ASSESSMENT/PLAN  Updated problem list and treatment plan: Diagnosis 1:  BPPV     STG/LTGs have been met or progress has been made towards goals:  None  Assessment of Progress: Patient has not returned to therapy.  Current status is unknown and discharge G code cannot be reported.  Self Management Plans:  Patient has been instructed in a home treatment program.  Patient  has been instructed in self management of symptoms.    Celeste continues to require the following intervention to meet STG and LTG's:   The patient failed to complete scheduled/ordered appointments so current information is unknown.  We will discharge this patient from PT.    Recommendations:      Please refer to the daily flowsheet for treatment today, total treatment time and time spent performing 1:1 timed codes.

## 2017-09-03 ENCOUNTER — HEALTH MAINTENANCE LETTER (OUTPATIENT)
Age: 32
End: 2017-09-03

## 2018-01-19 ENCOUNTER — E-VISIT (OUTPATIENT)
Dept: FAMILY MEDICINE | Facility: CLINIC | Age: 33
End: 2018-01-19
Payer: COMMERCIAL

## 2018-01-19 DIAGNOSIS — N30.00 ACUTE CYSTITIS WITHOUT HEMATURIA: Primary | ICD-10-CM

## 2018-01-19 PROCEDURE — 99444 ZZC PHYSICIAN ONLINE EVALUATION & MANAGEMENT SERVICE: CPT | Performed by: NURSE PRACTITIONER

## 2018-01-19 RX ORDER — NITROFURANTOIN 25; 75 MG/1; MG/1
100 CAPSULE ORAL 2 TIMES DAILY
Qty: 10 CAPSULE | Refills: 0 | Status: SHIPPED | OUTPATIENT
Start: 2018-01-19 | End: 2018-01-24

## 2018-03-21 ENCOUNTER — TELEPHONE (OUTPATIENT)
Dept: OBGYN | Facility: CLINIC | Age: 33
End: 2018-03-21

## 2018-03-21 NOTE — TELEPHONE ENCOUNTER
Received refill request for OCP. Due for annual with PAP. Left message for patient to call and schedule.

## 2018-04-04 DIAGNOSIS — N80.9 ENDOMETRIOSIS: ICD-10-CM

## 2018-04-04 DIAGNOSIS — Z80.41 FAMILY HISTORY OF OVARIAN CANCER: ICD-10-CM

## 2018-04-04 DIAGNOSIS — Z30.41 ORAL CONTRACEPTIVE USE: ICD-10-CM

## 2018-04-04 RX ORDER — NORGESTIMATE AND ETHINYL ESTRADIOL 0.25-0.035
KIT ORAL
Qty: 28 TABLET | Refills: 0 | Status: SHIPPED | OUTPATIENT
Start: 2018-04-04 | End: 2018-04-26

## 2018-04-04 NOTE — TELEPHONE ENCOUNTER
Received refill request for OCP.  Last in clinic 12/2016, last pap 6/2014 so due for pap.    Tried to reach [facility] but received voicemail.  Left message that refill request was received and a one month supply can be sent to pharmacy but office visit is due for further refills. Please call [phone] to schedule.

## 2018-04-26 ENCOUNTER — OFFICE VISIT (OUTPATIENT)
Dept: OBGYN | Facility: CLINIC | Age: 33
End: 2018-04-26
Attending: ADVANCED PRACTICE MIDWIFE
Payer: COMMERCIAL

## 2018-04-26 VITALS
WEIGHT: 119 LBS | HEART RATE: 73 BPM | DIASTOLIC BLOOD PRESSURE: 78 MMHG | BODY MASS INDEX: 21.9 KG/M2 | SYSTOLIC BLOOD PRESSURE: 125 MMHG | HEIGHT: 62 IN

## 2018-04-26 DIAGNOSIS — Z12.4 SCREENING FOR MALIGNANT NEOPLASM OF CERVIX: ICD-10-CM

## 2018-04-26 DIAGNOSIS — N80.9 ENDOMETRIOSIS: ICD-10-CM

## 2018-04-26 DIAGNOSIS — Z00.00 VISIT FOR PREVENTIVE HEALTH EXAMINATION: ICD-10-CM

## 2018-04-26 DIAGNOSIS — Z13.29 SCREENING FOR THYROID DISORDER: ICD-10-CM

## 2018-04-26 DIAGNOSIS — Z80.41 FAMILY HISTORY OF OVARIAN CANCER: ICD-10-CM

## 2018-04-26 DIAGNOSIS — Z97.5 IUD (INTRAUTERINE DEVICE) IN PLACE: Primary | ICD-10-CM

## 2018-04-26 DIAGNOSIS — F43.22 ADJUSTMENT DISORDER WITH ANXIOUS MOOD: ICD-10-CM

## 2018-04-26 DIAGNOSIS — Z30.41 ORAL CONTRACEPTIVE USE: ICD-10-CM

## 2018-04-26 DIAGNOSIS — Z01.419 ENCOUNTER FOR GYNECOLOGICAL EXAMINATION WITHOUT ABNORMAL FINDING: ICD-10-CM

## 2018-04-26 PROCEDURE — G0145 SCR C/V CYTO,THINLAYER,RESCR: HCPCS | Performed by: ADVANCED PRACTICE MIDWIFE

## 2018-04-26 PROCEDURE — G0463 HOSPITAL OUTPT CLINIC VISIT: HCPCS | Mod: ZF

## 2018-04-26 PROCEDURE — 87624 HPV HI-RISK TYP POOLED RSLT: CPT | Performed by: ADVANCED PRACTICE MIDWIFE

## 2018-04-26 RX ORDER — NORGESTIMATE AND ETHINYL ESTRADIOL 0.25-0.035
KIT ORAL
Qty: 84 TABLET | Refills: 3 | Status: ON HOLD | OUTPATIENT
Start: 2018-04-26 | End: 2018-09-12

## 2018-04-26 RX ORDER — KETOCONAZOLE 20 MG/ML
SHAMPOO TOPICAL
Refills: 0 | COMMUNITY
Start: 2017-07-03 | End: 2019-03-25

## 2018-04-26 RX ORDER — CITALOPRAM HYDROBROMIDE 20 MG/1
20 TABLET ORAL DAILY
Qty: 90 TABLET | Refills: 4 | Status: SHIPPED | OUTPATIENT
Start: 2018-04-26 | End: 2019-07-03

## 2018-04-26 RX ORDER — FLUOCINOLONE ACETONIDE 0.11 MG/ML
OIL TOPICAL
Refills: 4 | COMMUNITY
Start: 2017-07-03 | End: 2018-09-10

## 2018-04-26 ASSESSMENT — ANXIETY QUESTIONNAIRES
5. BEING SO RESTLESS THAT IT IS HARD TO SIT STILL: NOT AT ALL
1. FEELING NERVOUS, ANXIOUS, OR ON EDGE: SEVERAL DAYS
2. NOT BEING ABLE TO STOP OR CONTROL WORRYING: NOT AT ALL
3. WORRYING TOO MUCH ABOUT DIFFERENT THINGS: NOT AT ALL
6. BECOMING EASILY ANNOYED OR IRRITABLE: NOT AT ALL
GAD7 TOTAL SCORE: 2
7. FEELING AFRAID AS IF SOMETHING AWFUL MIGHT HAPPEN: NOT AT ALL

## 2018-04-26 ASSESSMENT — PATIENT HEALTH QUESTIONNAIRE - PHQ9: 5. POOR APPETITE OR OVEREATING: SEVERAL DAYS

## 2018-04-26 NOTE — PROGRESS NOTES
Progress Note    SUBJECTIVE:  Celeste Arguello is an 33 year old, , who requests an Annual Preventive Exam.       Concerns today include: trying to quit smoking again, recently quit x 3 months, has many resources at work, knows all options, did not like tarik  Went to genetic/cancer screening appointment last summer, needs to finalize work up with blood work, will do after this visit (initially didn't think she would do it). High risk for BR CA-plan to start screening age 34, high risk for ovarian CA-needs blood work results for final plan from CA team.  HAppy w OCP and IUD controlling pain and cysts.   Is not absolutely sure about more children, but considers OB/GYN consult for hysterectomy at times. Knows she can schedule w MD.  Mood is stable, happy w celexa    Menstrual History:  Menstrual History 2016   LAST MENSTRUAL PERIOD 2016 - -   Menarche age - - 13   Period Cycle (Days) - - 20 to 40 days   Period Duration (Days) - - 4 days   Method of Contraception - Combined oral contraceptive;Mirena IUD Mirena IUD   Period Pattern - Irregular Irregular   Menstrual Flow - Light Heavy   Menstrual Control - Panty liner Tampon   Dysmenorrhea - None Severe   PMS Symptoms - - Cramping;Diarrhea   Reviewed Today - Yes Yes       Last    Lab Results   Component Value Date    PAP NIL 2014     History of abnormal Pap smear: NO - age 30-65 PAP every 5 years with negative HPV co-testing recommended    Last No results found for: HPV16  Last No results found for: HPV18  Last No results found for: HRHPV    Mammogram current: not applicable  Last Mammogram:   No results found.     Last Colonoscopy:  No results found for this or any previous visit.      HISTORY:    Current Outpatient Prescriptions on File Prior to Visit:  levonorgestrel (MIRENA, 52 MG,) 20 MCG/24HR IUD 1 Device by Intrauterine route Was inserted [facility]  2015   meclizine (ANTIVERT) 25 MG tablet Take 25 mg by  mouth 3 times daily as needed   ondansetron (ZOFRAN-ODT) 4 MG ODT tab Take 4 mg by mouth every 12 hours   [DISCONTINUED] citalopram (CELEXA) 20 MG tablet Take 1 tablet (20 mg) by mouth daily   [DISCONTINUED] norgestimate-ethinyl estradiol (ORTHO-CYCLEN, SPRINTEC) 0.25-35 MG-MCG per tablet Take active pill daily.     No current facility-administered medications on file prior to visit.   Allergies   Allergen Reactions     Nkda [No Known Drug Allergies]      Immunization History   Administered Date(s) Administered     Influenza (IIV3) PF 2010     Influenza Vaccine IM 3yrs+ 4 Valent IIV4 10/26/2016     Mantoux Tuberculin Skin Test 2010     TDAP Vaccine (Adacel) 2010       Obstetric History       T1      L1     SAB0   TAB0   Ectopic0   Multiple0   Live Births1      Past Medical History:   Diagnosis Date     Complex Ovarian cyst-- incidental US finding R only 2012-> resolved     Mirena IUD -- strings not visible --> Plan RETRIEVE / replace by 3/10/15 2012    2/9/15: US documents placement in uterus. 2012: Documented placement in uterus / US.  3/10/2009: Mirena inserted.       Normal puberty age 10    cycles q 30 x 7, heavy w cramps     Right 3 cm soft, painful to palpation, ovarian cyst 2014    recurrent physiologic ovarian cyst -> US if pain persists.      Vertigo      Past Surgical History:   Procedure Laterality Date     LAPAROSCOPY DIAGNOSTIC (GYN)      endometriosis     urethral reimplant  age 6     Family History   Problem Relation Age of Onset     Ovarian Cancer Mother 38      age 47     Hereditary Breast and Ovarian Cancer Syndrome Maternal Aunt 50     Breast cancer gene -- MEHRDAD/BSO and mastectomy     Pancreatic Cancer Maternal Uncle 38     Alcohol/Drug Brother      active     Alcohol/Drug Brother      recovering     Psychotic Disorder Brother      depression and ADHD     Breast Cancer Maternal Aunt 35     & MC age 36     Uterine Cancer  "Maternal Aunt      C.A.D. 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 One    How much calcium per day? Dairy products       How much caffeine per day? 1 cup coffee and 2 can of coke    How much vitamin D per day? None    Do you/your family wear seatbelts?  Yes    Do you/your family use safety helmets? yes    Do you/your family use sunscreen? Yes    Do you/your family keep firearms in the home? No    Do you/your family have a smoke detector(s)? Yes        Do you feel safe in your home? Yes    Has anyone ever touched you in an unwanted manner? No     Explain     SEE GLADYS [facility]     06/16/2014    Beronica Dumas [facility] 04/26/18               ROS  [unfilled]  PHQ-9 SCORE 12/22/2016 3/16/2017 4/26/2018   Total Score - - -   Total Score 0 2 1     LUIS-7 SCORE 12/22/2016 3/16/2017 4/26/2018   Total Score 2 2 2         EXAM:  Blood pressure 125/78, pulse 73, height 1.575 m (5' 2\"), weight 54 kg (119 lb), not currently " breastfeeding. Body mass index is 21.77 kg/(m^2).  General - pleasant female in no acute distress.  Skin - no suspicious lesions or rashes  EENT-  PERRLA, euthyroid with out palpable nodules  Neck - supple without lymphadenopathy.  Lungs - clear to auscultation bilaterally.  Heart - regular rate and rhythm without murmur.  Abdomen - soft, nontender, nondistended, no masses or organomegaly noted.  Musculoskeletal - no gross deformities.  Neurological - normal strength, sensation, and mental status.    Breast Exam:  Breast: Without visible skin changes. No dimpling or lesions seen.   Breasts supple, non-tender with palpation, no dominant mass, nodularity, or nipple discharge noted bilaterally. Axillary nodes negative.      Pelvic Exam:  EG/BUS: Normal genital architecture without lesions, erythema or abnormal secretions Bartholin's, Urethra, Pigeon's normal   Urethral meatus: normal   Urethra: no masses, tenderness, or scarring   Bladder: no masses or tenderness   Vagina: moist, pink, rugae with creamy, white and odorless  secretions  Cervix: no lesions, pink, moist, closed, without lesion or CMT and IUD strings extend 3 cm from external os. Pap done  Uterus: anteverted,  and small, smooth, firm, mobile w/o pain  Adnexa: Within normal limits and No masses, nodularity, tenderness  Rectum:anus normal       ASSESSMENT:  Encounter Diagnoses   Name Primary?     Visit for preventive health examination      Screening for malignant neoplasm of cervix      Encounter for gynecological examination without abnormal finding      Screening for thyroid disorder      Adjustment disorder with anxious mood      Mother  of ovarian cancer, age 47      Endometriosis      Oral contraceptive use -- for cyst control      Mirena IUD (intrauterine device) in place Yes        PLAN:   Orders Placed This Encounter   Procedures     Pelvic and Breast Exam Procedure []     Pap Smear Exam [] Do Not Remove     Pap imaged thin layer screen  with HPV - recommended age 30 - 65 years (select HPV order below)     HPV High Risk Types DNA Cervical     TSH with free T4 reflex     Orders Placed This Encounter   Medications     Fluocinolone Acetonide Scalp 0.01 % OIL oil     Sig: APPLY TO SCALP TWICE A DAY FOR UP TO 3 WEEKS. WAIT 1 WEEK BETWEEN TREATMENTS.     Refill:  4     ketoconazole (NIZORAL) 2 % shampoo     Sig: LATHER IN SCALP FOR 5 MINUTES PRIOR TO RINSING. USE EVERY OTHER DAY FOR 1 WEEK.     Refill:  0     levonorgestrel (MIRENA) 20 MCG/24HR IUD     Si Device by Intrauterine route     citalopram (CELEXA) 20 MG tablet     Sig: Take 1 tablet (20 mg) by mouth daily     Dispense:  90 tablet     Refill:  4     norgestimate-ethinyl estradiol (ORTHO-CYCLEN, SPRINTEC) 0.25-35 MG-MCG per tablet     Sig: Take active pill daily.     Dispense:  84 tablet     Refill:  3       Additional teaching done at this visit regarding self breast exam, birth control, smoking cessation and mental health.    Return to clinic in one year.  Follow-up as needed.

## 2018-04-26 NOTE — LETTER
Date:April 27, 2018      Patient was self referred, no letter generated. Do not send.        [facility] Health Information

## 2018-04-26 NOTE — LETTER
2018       RE: Celeste Arguello  [address]  [address]  [facility] [address]     Dear Colleague,    Thank you for referring your patient, Celeste Arguello, to the [facility] at [facility]. Please see a copy of my visit note below.          Progress Note    SUBJECTIVE:  Celeste Arguello is an 33 year old, , who requests an Annual Preventive Exam.       Concerns today include: trying to quit smoking again, recently quit x 3 months, has many resources at work, knows all options, did not like shantix  Went to genetic/cancer screening appointment last summer, needs to finalize work up with blood work, will do after this visit (initially didn't think she would do it). High risk for BR CA-plan to start screening age 34, high risk for ovarian CA-needs blood work results for final plan from CA team.  HAppy w OCP and IUD controlling pain and cysts.   Is not absolutely sure about more children, but considers OB/GYN consult for hysterectomy at times. Knows she can schedule w MD.  Mood is stable, happy w celexa    Menstrual History:  Menstrual History 2016   LAST MENSTRUAL PERIOD 2016 - -   Menarche age - - 13   Period Cycle (Days) - - 20 to 40 days   Period Duration (Days) - - 4 days   Method of Contraception - Combined oral contraceptive;Mirena IUD Mirena IUD   Period Pattern - Irregular Irregular   Menstrual Flow - Light Heavy   Menstrual Control - Panty liner Tampon   Dysmenorrhea - None Severe   PMS Symptoms - - Cramping;Diarrhea   Reviewed Today - Yes Yes       Last    Lab Results   Component Value Date    PAP NIL 2014     History of abnormal Pap smear: NO - age 30-65 PAP every 5 years with negative HPV co-testing recommended    Last No results found for: HPV16  Last No results found for: HPV18  Last No results found for: HRHPV    Mammogram current: not applicable  Last Mammogram:   No results  found.     Last Colonoscopy:  No results found for this or any previous visit.      HISTORY:    Current Outpatient Prescriptions on File Prior to Visit:  levonorgestrel (MIRENA, 52 MG,) 20 MCG/24HR IUD 1 Device by Intrauterine route Was inserted [facility]  2015   meclizine (ANTIVERT) 25 MG tablet Take 25 mg by mouth 3 times daily as needed   ondansetron (ZOFRAN-ODT) 4 MG ODT tab Take 4 mg by mouth every 12 hours   [DISCONTINUED] citalopram (CELEXA) 20 MG tablet Take 1 tablet (20 mg) by mouth daily   [DISCONTINUED] norgestimate-ethinyl estradiol (ORTHO-CYCLEN, SPRINTEC) 0.25-35 MG-MCG per tablet Take active pill daily.     No current facility-administered medications on file prior to visit.   Allergies   Allergen Reactions     Nkda [No Known Drug Allergies]      Immunization History   Administered Date(s) Administered     Influenza (IIV3) PF 2010     Influenza Vaccine IM 3yrs+ 4 Valent IIV4 10/26/2016     Mantoux Tuberculin Skin Test 2010     TDAP Vaccine (Adacel) 2010       Obstetric History       T1      L1     SAB0   TAB0   Ectopic0   Multiple0   Live Births1      Past Medical History:   Diagnosis Date     Complex Ovarian cyst-- incidental US finding R only 2012-> resolved     Mirena IUD -- strings not visible --> Plan RETRIEVE / replace by 3/10/15 2012    2/9/15: US documents placement in uterus. 2012: Documented placement in uterus / US.  3/10/2009: Mirena inserted.       Normal puberty age 10    cycles q 30 x 7, heavy w cramps     Right 3 cm soft, painful to palpation, ovarian cyst 2014    recurrent physiologic ovarian cyst -> US if pain persists.      Vertigo 2017     Past Surgical History:   Procedure Laterality Date     LAPAROSCOPY DIAGNOSTIC (GYN)      endometriosis     urethral reimplant  age 6     Family History   Problem Relation Age of Onset     Ovarian Cancer Mother 38      age 47     Hereditary Breast and Ovarian Cancer Syndrome  Maternal Aunt 50     Breast cancer gene -- MEHRDAD/BSO and mastectomy     Pancreatic Cancer Maternal Uncle 38     Alcohol/Drug Brother      active     Alcohol/Drug Brother      recovering     Psychotic Disorder Brother      depression and ADHD     Breast Cancer Maternal Aunt 35     & MC age 36     Uterine Cancer Maternal Aunt      C.A.D. 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 One    How much calcium per day? Dairy products       How much caffeine per day? 1 cup coffee and 2 can of coke    How much vitamin D per day? None    Do you/your family wear seatbelts?  Yes    Do you/your family use safety helmets? yes    Do you/your family use sunscreen? Yes    Do you/your family keep firearms in the home? No    Do you/your family have a smoke detector(s)? Yes        Do you feel safe in your home? Yes    Has anyone ever touched you in an unwanted manner? No     Explain     SEE KIMBERLY GRAHAM     06/16/2014    Beronica  "[facility] 18               EDUARDO  [unfilled]  PHQ-9 SCORE 2016 3/16/2017 2018   Total Score - - -   Total Score 0 2 1     LUIS-7 SCORE 2016 3/16/2017 2018   Total Score 2 2 2         EXAM:  Blood pressure 125/78, pulse 73, height 1.575 m (5' 2\"), weight 54 kg (119 lb), not currently breastfeeding. Body mass index is 21.77 kg/(m^2).  General - pleasant female in no acute distress.  Skin - no suspicious lesions or rashes  EENT-  PERRLA, euthyroid with out palpable nodules  Neck - supple without lymphadenopathy.  Lungs - clear to auscultation bilaterally.  Heart - regular rate and rhythm without murmur.  Abdomen - soft, nontender, nondistended, no masses or organomegaly noted.  Musculoskeletal - no gross deformities.  Neurological - normal strength, sensation, and mental status.    Breast Exam:  Breast: Without visible skin changes. No dimpling or lesions seen.   Breasts supple, non-tender with palpation, no dominant mass, nodularity, or nipple discharge noted bilaterally. Axillary nodes negative.      Pelvic Exam:  EG/BUS: Normal genital architecture without lesions, erythema or abnormal secretions Bartholin's, Urethra, [place]'s normal   Urethral meatus: normal   Urethra: no masses, tenderness, or scarring   Bladder: no masses or tenderness   Vagina: moist, pink, rugae with creamy, white and odorless  secretions  Cervix: no lesions, pink, moist, closed, without lesion or CMT and IUD strings extend 3 cm from external os. Pap done  Uterus: anteverted,  and small, smooth, firm, mobile w/o pain  Adnexa: Within normal limits and No masses, nodularity, tenderness  Rectum:anus normal       ASSESSMENT:  Encounter Diagnoses   Name Primary?     Visit for preventive health examination      Screening for malignant neoplasm of cervix      Encounter for gynecological examination without abnormal finding      Screening for thyroid disorder      Adjustment disorder with anxious mood      Mother  of " ovarian cancer, age 47      Endometriosis      Oral contraceptive use -- for cyst control      Mirena IUD (intrauterine device) in place Yes        PLAN:   Orders Placed This Encounter   Procedures     Pelvic and Breast Exam Procedure []     Pap Smear Exam [] Do Not Remove     Pap imaged thin layer screen with HPV - recommended age 30 - 65 years (select HPV order below)     HPV High Risk Types DNA Cervical     TSH with free T4 reflex     Orders Placed This Encounter   Medications     Fluocinolone Acetonide Scalp 0.01 % OIL oil     Sig: APPLY TO SCALP TWICE A DAY FOR UP TO 3 WEEKS. WAIT 1 WEEK BETWEEN TREATMENTS.     Refill:  4     ketoconazole (NIZORAL) 2 % shampoo     Sig: LATHER IN SCALP FOR 5 MINUTES PRIOR TO RINSING. USE EVERY OTHER DAY FOR 1 WEEK.     Refill:  0     levonorgestrel (MIRENA) 20 MCG/24HR IUD     Si Device by Intrauterine route     citalopram (CELEXA) 20 MG tablet     Sig: Take 1 tablet (20 mg) by mouth daily     Dispense:  90 tablet     Refill:  4     norgestimate-ethinyl estradiol (ORTHO-CYCLEN, SPRINTEC) 0.25-35 MG-MCG per tablet     Sig: Take active pill daily.     Dispense:  84 tablet     Refill:  3       Additional teaching done at this visit regarding self breast exam, birth control, smoking cessation and mental health.    Return to clinic in one year.  Follow-up as needed.          Again, thank you for allowing me to participate in the care of your patient.      Sincerely,    ENRIQUE Castro CNM

## 2018-04-26 NOTE — MR AVS SNAPSHOT
After Visit Summary   2018    Celeste Arguello    MRN: [id number]           Patient Information     Date Of Birth          1985        Visit Information        Provider Department      2018 11:15 AM Delores Camarena APRN CNM [facility]        Today's Diagnoses     Mirena IUD (intrauterine device) in place    -  1    Visit for preventive health examination        Screening for malignant neoplasm of cervix        Encounter for gynecological examination without abnormal finding        Screening for thyroid disorder        Adjustment disorder with anxious mood        Mother  of ovarian cancer, age 47        Endometriosis        Oral contraceptive use -- for cyst control          Care Instructions      PREVENTIVE HEALTH RECOMMENDATIONS:   Most women need a yearly breast and pelvic exam.    A PAP screen, a test done DURING a pelvic exam, is NO longer recommended yearly.    2013, screening guidelines recommended by ACOG for PAP screen are:    1) First pap at age 21.    2) Pap every 3 years until age 30.    3) After age 30, pap every 3 years or Pap with HR HPV screen every 5 years until age 65.  4) Women do NOT need a vaginal Pap screen after a hysterectomy (surgical removal of the uterus) when they have not had cancer.    Exceptions:  1) Yearly pap if HIV+ or immunosuppressed secondary to organ transplant  2) CAIN II-III continue routine screening for 20 years.    I encourage you continue looking for opportunities to choose a healthy lifestyle:       * Choose to eat a heart healthy diet. Check out the FOOD PLATE guidelines at: http://www.choosemyplate.gov/ for helpful hints on weight and cholesterol management.  Balance your caloric intake with exercise to maintain a BMI in the 22 to 26 range. For bone health: Eat calcium-rich foods like yogurt, broccoli or take chewable calcium pills (500 to 600 mg) twice a day with food.       * Exercise for at  least an average of 30 minutes a day, 5 days of the week. This will help you control your weight, release stress, and help prevent disease.      * Take a Vitamin D3 supplement daily fall through spring and during summer unless you [id number]' full body sun exposure to skin without sunscreen.      * DO wear sunscreen to prevent skin cancer after the first 15-30 minutes.      * Identify stressors in your life, find ways to release the stress, and, make time for yourself. PLEASE ask for help if mood changes last longer than two weeks.     * Limit alcohol to one drink per day.  No smoking.  Avoid second hand smoke. If you smoke, ask for help to stop.       *  If you are in a sexual relationship, talk with your partner about possible infection risks and take action to protect yourself from exposure to a sexual infection.    Please request an infection screen for STIs (sexually transmitted infections) if you are less than age 26 OR believe that you may be at risk.     Get a flu shot each year. Get a tetanus shot every 10 years. EVERYONE needs a pertussis (Whooping cough) booster.    See your dentist twice a year for an exam and preventive care cleaning.     Consider the following screen tests:    1) cholesterol test every 5 years.     2) yearly mammogram after age 40 unless you have identified risks.    3) colonoscopy every 10 years after age 50 unless you have identified risks.    4) diabetes blood test screening if you are at risk for diabetes.      Additional information that you may also find helpful:  The Internet now gives us access to LOTS of information -- some of it helpful, research documented and also plenty of harmful, anecdotal information that may not pertain to your situtaion. Consider visiting the following websites for accurate health information:    www.vitamindcouncil.org/ : Info and ongoing research re Vitamin D    www.fairview.org : Up to date and easily searchable information on multiple  topics.    www.medlineplus.gov : medication info, interactive tutorials, watch real surgeries online    www.cdc.gov : public health info, travel advisories, epidemics (H1N1)    www.dillon/std.org: current research re diagnosis, treatment and prevention of sexually contacted infections.    www.health.state.[place].us : [place] dept of [facility], public health issues in [place], N1N1    www.familydoctor.org : good info from the Academy of Family Physicians                Follow-ups after your visit        Follow-up notes from your care team     Return in 1 year (on 4/26/2019) for Preventative Health Visit.      Your next 10 appointments already scheduled     May 09, 2018  3:30 PM CDT   (Arrive by 3:15 PM)   RETURN WITH ROOM with Dara Claros GC,  2 [address] CONSULT [facility] ([facility])    [address] [place] [address]  [address]  [facility] [address] [phone]              Who to contact     Please call your clinic at [phone] to:    Ask questions about your health    Make or cancel appointments    Discuss your medicines    Learn about your test results    Speak to your doctor            Additional Information About Your Visit        [employer] Information     [employer] gives you secure access to your electronic health record. If you see a primary care provider, you can also send messages to your care team and make appointments. If you have questions, please call your primary care clinic.  If you do not have a primary care provider, please call [phone] and they will assist you.      [employer] is an electronic gateway that provides easy, online access to your medical records. With [employer], you can request a clinic appointment, read your test results, renew a prescription or communicate with your care team.     To access your existing account, please contact your [facility] or call [phone] for assistance.        Care EveryWhere ID     This is your  "Care EveryWhere ID. This could be used by other organizations to access your [place] medical records  [id number]        Your Vitals Were     Pulse Height BMI (Body Mass Index)             73 1.575 m (5' 2\") 21.77 kg/m2          Blood Pressure from Last 3 Encounters:   04/26/18 125/78   03/16/17 113/71   12/22/16 117/77    Weight from Last 3 Encounters:   04/26/18 54 kg (119 lb)   03/16/17 53.5 kg (118 lb)   12/22/16 52.4 kg (115 lb 9.6 oz)              We Performed the Following     HPV High Risk Types DNA Cervical     Pap imaged thin layer screen with HPV - recommended age 30 - 65 years (select HPV order below)     Pap Smear Exam [] Do Not Remove     Pelvic and Breast Exam Procedure []     TSH with free T4 reflex          Today's Medication Changes          These changes are accurate as of 4/26/18 12:55 PM.  If you have any questions, ask your nurse or doctor.               Stop taking these medicines if you haven't already. Please contact your care team if you have questions.     varenicline 1 MG tablet   Commonly known as:  CHANTIX   Stopped by:  Delores Camarena APRN CNM                Where to get your medicines      These medications were sent to [facility] [address] [place], [place] - [address]  [address] [address]     Phone:  [phone]     citalopram 20 MG tablet    norgestimate-ethinyl estradiol 0.25-35 MG-MCG per tablet                Primary Care Provider    None Specified       No primary provider on file.        Equal Access to Services     MUMTAZ ABURTO : Malik Clay, waaxda luqadaha, qaybta kaalmada edgardo hobbs. So [facility] [phone].    ATENCIÓN: Si habla español, tiene a de jesus disposición servicios gratuitos de asistencia lingüística. Llame al [phone].    We comply with applicable federal civil rights laws and [place] laws. We do not discriminate on the basis of race, color, " national origin, age, disability, sex, sexual orientation, or gender identity.            Thank you!     Thank you for choosing [facility]  for your care. Our goal is always to provide you with excellent care. Hearing back from our patients is one way we can continue to improve our services. Please take a few minutes to complete the written survey that you may receive in the mail after your visit with us. Thank you!             Your Updated Medication List - Protect others around you: Learn how to safely use, store and throw away your medicines at www.disposemymeds.org.          This list is accurate as of 4/26/18 12:55 PM.  Always use your most recent med list.                   Brand Name Dispense Instructions for use Diagnosis    citalopram 20 MG tablet    celeXA    90 tablet    Take 1 tablet (20 mg) by mouth daily    Adjustment disorder with anxious mood       Fluocinolone Acetonide Scalp 0.01 % Oil oil      APPLY TO SCALP TWICE A DAY FOR UP TO 3 WEEKS. WAIT 1 WEEK BETWEEN TREATMENTS.        ketoconazole 2 % shampoo    NIZORAL     LATHER IN SCALP FOR 5 MINUTES PRIOR TO RINSING. USE EVERY OTHER DAY FOR 1 WEEK.        meclizine 25 MG tablet    ANTIVERT     Take 25 mg by mouth 3 times daily as needed        * MIRENA (52 MG) 20 MCG/24HR IUD   Generic drug:  levonorgestrel      1 Device by Intrauterine route Was inserted [facility]  2-        * levonorgestrel 20 MCG/24HR IUD    MIRENA     1 Device by Intrauterine route        norgestimate-ethinyl estradiol 0.25-35 MG-MCG per tablet    ORTHO-CYCLEN, SPRINTEC    84 tablet    Take active pill daily.    Family history of ovarian cancer, Endometriosis, Oral contraceptive use       ondansetron 4 MG ODT tab    ZOFRAN-ODT     Take 4 mg by mouth every 12 hours        * Notice:  This list has 2 medication(s) that are the same as other medications prescribed for you. Read the directions carefully, and ask your doctor or other care provider to review them  with you.

## 2018-04-26 NOTE — PATIENT INSTRUCTIONS

## 2018-04-26 NOTE — NURSING NOTE
Chief Complaint   Patient presents with     Annual Visit     Patient has no concerns. Her for Annual

## 2018-04-27 ASSESSMENT — PATIENT HEALTH QUESTIONNAIRE - PHQ9: SUM OF ALL RESPONSES TO PHQ QUESTIONS 1-9: 1

## 2018-04-27 ASSESSMENT — ANXIETY QUESTIONNAIRES: GAD7 TOTAL SCORE: 2

## 2018-04-30 LAB
COPATH REPORT: NORMAL
PAP: NORMAL

## 2018-05-02 LAB
FINAL DIAGNOSIS: NORMAL
HPV HR 12 DNA CVX QL NAA+PROBE: NEGATIVE
HPV16 DNA SPEC QL NAA+PROBE: NEGATIVE
HPV18 DNA SPEC QL NAA+PROBE: NEGATIVE
SPECIMEN DESCRIPTION: NORMAL
SPECIMEN SOURCE CVX/VAG CYTO: NORMAL

## 2018-05-09 ENCOUNTER — OFFICE VISIT (OUTPATIENT)
Dept: ONCOLOGY | Facility: CLINIC | Age: 33
End: 2018-05-09
Attending: GENETIC COUNSELOR, MS
Payer: COMMERCIAL

## 2018-05-09 DIAGNOSIS — Z80.3 FAMILY HISTORY OF MALIGNANT NEOPLASM OF BREAST: ICD-10-CM

## 2018-05-09 DIAGNOSIS — Z80.41 FAMILY HISTORY OF MALIGNANT NEOPLASM OF OVARY: ICD-10-CM

## 2018-05-09 DIAGNOSIS — Z80.41 FAMILY HISTORY OF MALIGNANT NEOPLASM OF OVARY: Primary | ICD-10-CM

## 2018-05-09 LAB — MISCELLANEOUS TEST: NORMAL

## 2018-05-09 PROCEDURE — 96040 ZZH GENETIC COUNSELING, EACH 30 MINUTES: CPT | Mod: ZF | Performed by: GENETIC COUNSELOR, MS

## 2018-05-09 NOTE — LETTER
"5/9/2018       RE: Celeste Arguello  [address]  [address]  [facility] [address]     Dear Colleague,    Thank you for referring your patient, Celeste Arguello, to the [facility]. Please see a copy of my visit note below.    5/9/2018    Referring Provider: self-referred    Presenting Information:   Celeste (\"Klaudia\") Saundra came in to clinic for a follow-up visit today. She had previously been seen in the Cancer Risk Management Program at the [facility] on 2/1/2017.  At that time, we had tried to obtain a copy of her mother's genetic test report, and Klaudia had thought that her mother had genetic testing. This was unable to be obtained. Now, Klaudia feels that she is ready to undergo genetic testing.   Please see my clinic note from 2/1/2017. Klaudia reports no changes to her personal or family history of cancer.     Discussion:    We previously reviewed the details of Klaudia's family and personal history. See note from previous appointment for details.     We again reviewed that mutations in either BRCA1 or BRCA2 cause Hereditary Breast and Ovarian Cancer syndrome (HBOC).  Women with HBOC have an increased risk for breast and ovarian cancer.  There is also an increased risk for prostate and breast cancer among males with a BRCA1/BRCA2 mutation.  Males and females with BRCA1/BRCA2 mutations also face a slightly increased risk for pancreatic cancer.    We also briefly discussed Stevens syndrome, as ovarian cancer is a Stevens syndrome cancer. Stevens syndrome can be caused by a mutation in one of five genes:  MLH1, MSH2, MSH6, PMS2, and EPCAM.  Stevens syndrome may present with polyps, but typically a small number.  The highest cancer risks associated with Stevens syndrome include colon cancer, endometrial/uterine cancer, gastric cancer, and ovarian cancer.    We discussed that there are other genes that can cause increased risk for breast and/or gynecologic cancer. As we do not have a test report " available from Klaudia's relatives, we discussed that panel testing could be considered. We reviewed genetic testing options for hereditary gynecologic cancers: actionable high/moderate risk breast and gynecologic cancer risk custom panel (CustomNext-Cancer, 16 genes, a combination of GynPlus and BRCAplus) and expanded high and moderate risk panel testing (OvaNext, 25 genes).  Celeste expressed an interest in learning as much information as possible from the testing. She opted for the OvaNext panel.    Genetic testing is available for 25 genes associated with hereditary gynecologic cancers: OvaNext (ALEM, BARD1, BRCA1, BRCA2, BRIP1, CDH1, CHEK2, DICER1, EPCAM, MLH1, MRE11A, MSH2, MSH6, MUTYH, NBN, NF1, PALB2, PMS2, PTEN, RAD50, RAD51C, RAD51D, SMARCA4, STK11, and TP53).    We discussed that some of the genes in the OvaNext panel are associated with specific hereditary cancer syndromes and have published management guidelines: Hereditary Breast and Ovarian Cancer syndrome (BRCA1, BRCA2), Stevens syndrome (MLH1, MSH2, MSH6, PMS2, EPCAM), Hereditary Diffuse Gastric Cancer (CDH1), Cowden syndrome (PTEN), Li Fraumeni syndrome (TP53), Peutz-Jeghers syndrome (STK11), MUTYH Associated Polyposis syndrome (MUTYH), and Neurofibromatosis type 1 (NF1).      Risk-reducing salpingo-oophorectomy can be considered in women with mutations in BRIP1, RAD51C, or RAD51D.  Breast cancer risk management guidelines are available for ALEM, CHEK2, PALB2, NF1, and NBN.    The remaining genes (BARD1, DICER1, MRE11A, RAD50, and SMARCA4) are associated with increased cancer risk and may allow us to make medical recommendations when mutations are identified.      Consent was obtained and genetic testing for OvaNext was sent to [facility]. Turn around time: approximately 5 weeks. A detailed handout regarding breast and gynecologic cancer risk genes and the information we discussed was provided to Klaudia at the end of our appointment today and  can be found in the after visit summary.  Topics included: inheritance pattern, cancer risks, cancer screening recommendations, and also risks, benefits and limitations of testing.    The information from this test may determine cancer screening recommendations as well as options for risk reducing surgeries for Klaudia.  These recommendations will be discussed in detail once genetic testing is completed.    Plan:  1. All of Klaudia's questions were answered.   2. Klaudia signed a consent form at the conclusion of this visit and had her blood drawn for OvaNext.   3. Klaudia will return to clinic to discuss the results.  Turn around time: about 4-5 weeks     Face to face time 20 minutes.    Dara Claros MS, [facility]  Licensed Genetic Counselor  P. [phone]  F. [phone]

## 2018-05-09 NOTE — PATIENT INSTRUCTIONS
Assessing Cancer Risk  Only about 5-10% of cancers are thought to be due to an inherited cancer susceptibility gene.    These families often have:    Several people with the same or related types of cancer    Cancers diagnosed at a young age (before age 50)    Individuals with more than one primary cancer    Multiple generations of the family affected with cancer    Some people may be candidates for genetic testing of more than one gene.  For these families, genetic testing using a cancer panel may be offered.  These panels will test different genes known to increase the risk for breast, ovarian, uterine, and/or other cancers. All of the genes discussed below have published clinical management guidelines for individuals who are found to carry a mutation. The purpose of this handout is to serve as a brief summary of the genes analyzed by the panels used to inquire about hereditary breast and gynecologic cancer:  ALEM, BRCA1, BRCA2, BRIP1, CDH1, CHEK2, MLH1, MSH2, MSH6, PMS2, EPCAM, PTEN, PALB2, RAD51C, RAD51D, and TP53.  ______________________________________________________________________________  Hereditary Breast and Ovarian Cancer Syndrome   (BRCA1 and BRCA2)  A single mutation in one of the copies of BRCA1 or BRCA2 increases the risk for breast and ovarian cancer, among others.  The risk for pancreatic cancer and melanoma may also be slightly increased in some families.  The chart below shows the chance that someone with a BRCA mutation would develop cancer in his or her lifetime1,2,3,4.        A person s ethnic background is also important to consider, as individuals of Ashkenazi [employer] ancestry have a higher chance of having a BRCA gene mutation.  There are three BRCA mutations that occur more frequently in this population.    Stevens Syndrome   (MLH1, MSH2, MSH6, PMS2, and EPCAM)  Currently five genes are known to cause Stevens Syndrome: MLH1, MSH2, MSH6, PMS2, and EPCAM.  A single mutation in one of the  Stevens Syndrome genes increases the risk for colon, endometrial, ovarian, and stomach cancers.  Other cancers that occur less commonly in Stevens Syndrome include urinary tract, skin, and brain cancers.  The chart below shows the chance that a person with Stevens syndrome would develop cancer in his or her lifetime5.      *Cancer risk varies depending on Stevens syndrome gene found    Cowden Syndrome   (PTEN)  Cowden syndrome is a hereditary condition that increases the risk for breast, thyroid, endometrial, colon, and kidney cancer.  Cowden syndrome is caused by a mutation in the PTEN gene.  A single mutation in one of the copies of PTEN causes Cowden syndrome and increases cancer risk.  The chart below shows the chance that someone with a PTEN mutation would develop cancer in their lifetime6,7.  Other benign features seen in some individuals with Cowden syndrome include benign skin lesions (facial papules, keratoses, lipomas), learning disability, autism, thyroid nodules, colon polyps, and larger head size.      *One recent study found breast cancer risk to be increased to 85%    Li-Fraumeni Syndrome   (TP53)  Li-Fraumeni Syndrome (LFS) is a cancer predisposition syndrome caused by a mutation in the TP53 gene. A single mutation in one of the copies of TP53 increases the risk for multiple cancers. Individuals with LFS are at an increased risk for developing cancer at a young age. The lifetime risk for development of a LFS-associated cancer is 50% by age 30 and 90% by age 60.     Core Cancers: Sarcomas, Breast, Brain, Lung, Leukemias/Lymphomas, Adrenocortical carcinomas  Other Cancers: Gastrointestinal, Thyroid, Skin, Genitourinary    Hereditary Diffuse Gastric Cancer   (CDH1)  Currently, one gene is known to cause hereditary diffuse gastric cancer (HDGC): CDH1.  Individuals with HDGC are at increased risk for diffuse gastric cancer and lobular breast cancer. Of people diagnosed with HDGC, 30-50% have a mutation in the  CDH1 gene.  This suggests there are likely other genes that may cause HDGC that have not been identified yet.      Lifetime Cancer Risks    General Population HDGC    Diffuse Gastric  <1% ~80%   Breast 12% 39-52%         Additional Genes  ALEM  ALEM is a moderate-risk breast cancer gene. Women who have a mutation in ALEM can have between a 2-4 fold increased risk for breast cancer compared to the general population8. ALEM mutations have also been associated with increased risk for pancreatic cancer, however an estimate of this cancer risk is not well understood9. Individuals who inherit two ALEM mutations have a condition called ataxia-telangiectasia (AT).  This rare autosomal recessive condition affects the nervous system and immune system, and is associated with progressive cerebellar ataxia beginning in childhood.  Individuals with ataxia-telangiectasia often have a weakened immune system and have an increased risk for childhood cancers.    PALB2  Mutations in PALB2 have been shown to increase the risk of breast cancer up to 33-58% in some families; where individuals fall within this risk range is dependent upon family [id number]. PALB2 mutations have also been associated with increased risk for pancreatic cancer, although this risk has not been quantified yet.  Individuals who inherit two PALB2 mutations--one from their mother and one from their father--have a condition called Fanconi Anemia.  This rare autosomal recessive condition is associated with short stature, developmental delay, bone marrow failure, and increased risk for childhood cancers.    CHEK2   CHEK2 is a moderate-risk breast cancer gene.  Women who have a mutation in CHEK2 have around a 2-fold increased risk for breast cancer compared to the general population, and this risk may be higher depending upon family history.11,12,13 Mutations in CHEK2 have also been shown to increase the risk of a number of other cancers, including colon and prostate,  however these cancer risks are currently not well understood.    BRIP1, RAD51C and RAD51D  Mutations in BRIP1, RAD51C, and RAD51D have been shown to increase the risk of ovarian cancer and possibly female breast cancer as well14,15 .       Lifetime Cancer Risk    General Population BRIP1 RAD51C RAD51D   Ovarian 1-2% ~5-8% ~5-9% ~7-15%               Inheritance  All of the cancer syndromes reviewed above are inherited in an autosomal dominant pattern.  This means that if a parent has a mutation, each of his or her children will have a 50% chance of inheriting that same mutation.  Therefore, each child--male or female--would have a 50% chance of being at increased risk for developing cancer.      Image obtained from [employer] [facility] [employer], 2013     Mutations in some genes can occur de veronica, which means that a person s mutation occurred for the first time in them and was not inherited from a parent.  Now that they have the mutation, however, it can be passed on to future generations.    Genetic Testing  Genetic testing involves a blood test and will look at the genetic information in the ALEM, BRCA1, BRCA2, BRIP1, CDH1, CHEK2, MLH1, MSH2, MSH6, PMS2, EPCAM, PTEN, PALB2, RAD51C, RAD51D, and TP53 genes for any harmful mutations that are associated with increased cancer risk.  If possible, it is recommended that the person(s) who has had cancer be tested before other family members.  That person will give us the most useful information about whether or not a specific gene is associated with the cancer in the family.    Results  There are three possible results of genetic testing:    Positive--a harmful mutation was identified in one or more of the genes    Negative--no mutation was identified in any of the genes on this panel    Variant of unknown significance--a variation in one of the genes was identified, but it is unclear how this impacts cancer risk in the family    Advantages and Disadvantages   There are advantages  and disadvantages to genetic testing.    Advantages    May clarify your cancer risk    Can help you make medical decisions    May explain the cancers in your family    May give useful information to your family members (if you share your results)    Disadvantages    Possible negative emotional impact of learning about inherited cancer risk    Uncertainty in interpreting a negative test result in some situations    Possible genetic discrimination concerns (see below)    Genetic Information Nondiscrimination Act (EDUAR)  EDUAR is a federal law that protects individuals from health insurance or employment discrimination based on a genetic test result alone.  Although rare, there are currently no legal discrimination protections in terms of life insurance, long term care, or disability insurances.  Visit the [employer] website to learn more.    Reducing Cancer Risk  All of the genes described above have nationally recognized cancer screening guidelines that would be recommended for individuals who test positive.  In addition to increased cancer screening, surgeries may be offered or recommended to reduce cancer risk.  Recommendations are based upon an individual s genetic test result as well as their personal and family history of cancer.    Questions to Think About Regarding Genetic Testing:    What effect will the test result have on me and my relationship with my family members if I have an inherited gene mutation?  If I don t have a gene mutation?    Should I share my test results, and how will my family react to this news, which may also affect them?    Are my children ready to learn new information that may one day affect their own health?    Hereditary Cancer Resources    FORCE: Facing Our Risk of Cancer Empowered facingourrisk.org   Bright Pink bebrightpink.org   Li-Fraumeni Syndrome Association lfsassociation.org   PTEN World PTENworld.com   No stomach for cancer, Inc.  nostomachforcancer.org   Stomach cancer relief network Scrnet.org   Collaborative Group of the Americas on Inherited Colorectal Cancer (CGA) cgaicc.com    Cancer Care cancercare.org   American Cancer Society (ACS) cancer.org   National Cancer [employer] (NCI) cancer.gov     Cancer Risk Management Program [phone]-C[facility]ER ([phone])  ? Nita Hernandez, MS, [facility]  [phone]  ? Dara Claros, MS, [facility]  [phone]  ? Imelda Ellsworth, MS, [facility]  [phone]  ? Divine Bales, MS, [facility]  [phone]  ? Mari Edward, MS, [facility]  [phone]    References  1. Denise SOLIS, Amol PDP, Anna S, Iftikhar REDD, Lena JE, Kimberly JL, Lalo N, Shun H, Alivia O, Lupis A, Sarita B, Sunday P, Manrey S, Tal DM, Jared N, Natalia E, Reginald H, Rolando E, Debra J, Gronjere J, Juna B, Tulinius H, Thorlacius S, Eerola H, Nevradhikalinna H, Lavon K, Wing OP. Average risks of breast and ovarian cancer associated with BRCA1 or BRCA2 mutations detected in case series unselected for family history: a combined analysis of 222 studies. Am J Hum Teodora. 2003;72:1117-30.  2. Lyn N, Homa M, Mehta G.  BRCA1 and BRCA2 Hereditary Breast and Ovarian Cancer. Gene Reviews online. 2013.  3. Alber YC, Renny S, Roger G, Richarsdon S. Breast cancer risk among male BRCA1 and BRCA2 mutation carriers. J Natl Cancer Inst. 2007;99:1811-4.  4. Johnathon DE LEON, Kvng I, Andrews J, Delta E, Carlos ER, Tracy F. Risk of breast cancer in male BRCA2 carriers. J Med Te[place]ora. 2010;47:710-1.  5. National Comprehensive Cancer Network. Clinical practice guidelines in oncology, colorectal cancer screening. Available online (registration required). 2015.  6. Sj LEVI, Garrison GREGORY, Denny GREGORY, Froylan LA, Paulie MS, Eng C. Lifetime cancer risks in individuals with germline PTEN mutations. Clin Cancer Res. 2012;18:400-7.  7. Trupti OCAMPO. Cowden Syndrome: A Critical Review of the Clinical Literature. J Teodora . 2009:18:13-27.  8. Devin SOLIS, Dilip KEBEDE, Chele S, Brigitte P,  Darnell T, Ana M, Andrea B, Farrah H, Reymundo R, Nigel K, Lorena L, Johnathon DG, Tal D, Raymond DF, Maxim MR, The Breast Cancer Susceptibility Collaboration () & Lance TALLEY. ALEM mutations that cause ataxia-telangiectasia are breast cancer susceptibility alleles. Nature Genetics. 2006;38:873-875  9. Archie N , Nabil Y, Alta J, Kinga L, Kole GM , Poli ML, Gallinger S, Gloria AG, Syngal S, Hima ML, Debbie J , Juhi R, Noemi SZ, Estawana JR, Luba VE, Vonda M, Vogelstein B, Devante N, Preston RH, Rupali KW, and Josh AP. ALEM mutations in patients with hereditary pancreatic cancer. Cancer Discover. 2012;2:41-46  10. Denise HOPPER, et al. Breast-Cancer Risk in Families with Mutations in PALB2. NEJM. 2014; 371(6):497-506.  11. CHEK2 Breast Cancer Case-Control Consortium. CHEK2*1100delC and susceptibility to breast cancer: A collaborative analysis involving 10,860 breast cancer cases and 9,065 controls from 10 studies. Am J Hum [place]ora, 74 (2004), pp. 3468-4644  12. Stu T, Sri S, Christina K, et al. Spectrum of Mutations in BRCA1, BRCA2, CHEK2, and TP53 in Families at High Risk of Breast Cancer. BALJIT. 2006;295(12):9964-0089.   13. Kevin C, Yeimy D, Radha A, et al. Risk of breast cancer in women with a CHEK2 mutation with and without a family history of breast cancer. J Clin Oncol. 2011;29:4728-0126.  14. Kwabena H, Benson E, Radhames SJ, et al. Contribution of germline mutations in the RAD51B, RAD51C, and RAD51D genes to ovarian cancer in the population. J Clin Oncol. 2015;33(26):9074-1840. Doi:10.1200/JCO.2015.61.2408.  15. Rachael RAYA, Jill PAIGE, Zainab P, et al. Mutations in BRIP1 confer high risk of ovarian cancer. Chanell Teodora. 2011;43(11):1257-6726. doi:10.1038/ng.955.

## 2018-05-09 NOTE — PROGRESS NOTES
"5/9/2018    Referring Provider: self-referred    Presenting Information:   Celeste (\"Klaudia\") Saundra came in to clinic for a follow-up visit today. She had previously been seen in the Cancer Risk Management Program at the [facility] on 2/1/2017.  At that time, we had tried to obtain a copy of her mother's genetic test report, and Klaudia had thought that her mother had genetic testing. This was unable to be obtained. Now, Klaudia feels that she is ready to undergo genetic testing.   Please see my clinic note from 2/1/2017. Klaudia reports no changes to her personal or family history of cancer.     Discussion:    We previously reviewed the details of Klaudia's family and personal history. See note from previous appointment for details.     We again reviewed that mutations in either BRCA1 or BRCA2 cause Hereditary Breast and Ovarian Cancer syndrome (HBOC).  Women with HBOC have an increased risk for breast and ovarian cancer.  There is also an increased risk for prostate and breast cancer among males with a BRCA1/BRCA2 mutation.  Males and females with BRCA1/BRCA2 mutations also face a slightly increased risk for pancreatic cancer.    We also briefly discussed Stevens syndrome, as ovarian cancer is a Stevens syndrome cancer. Stevens syndrome can be caused by a mutation in one of five genes:  MLH1, MSH2, MSH6, PMS2, and EPCAM.  Stevens syndrome may present with polyps, but typically a small number.  The highest cancer risks associated with Stevens syndrome include colon cancer, endometrial/uterine cancer, gastric cancer, and ovarian cancer.    We discussed that there are other genes that can cause increased risk for breast and/or gynecologic cancer. As we do not have a test report available from Klaudia's relatives, we discussed that panel testing could be considered. We reviewed genetic testing options for hereditary gynecologic cancers: actionable high/moderate risk breast and gynecologic cancer risk custom panel (CustomNext-Cancer, 16 " genes, a combination of GynPlus and BRCAplus) and expanded high and moderate risk panel testing (OvaNext, 25 genes).  Celeste expressed an interest in learning as much information as possible from the testing. She opted for the OvaNext panel.    Genetic testing is available for 25 genes associated with hereditary gynecologic cancers: OvaNext (ALEM, BARD1, BRCA1, BRCA2, BRIP1, CDH1, CHEK2, DICER1, EPCAM, MLH1, MRE11A, MSH2, MSH6, MUTYH, NBN, NF1, PALB2, PMS2, PTEN, RAD50, RAD51C, RAD51D, SMARCA4, STK11, and TP53).    We discussed that some of the genes in the OvaNext panel are associated with specific hereditary cancer syndromes and have published management guidelines: Hereditary Breast and Ovarian Cancer syndrome (BRCA1, BRCA2), Stevens syndrome (MLH1, MSH2, MSH6, PMS2, EPCAM), Hereditary Diffuse Gastric Cancer (CDH1), Cowden syndrome (PTEN), Li Fraumeni syndrome (TP53), Peutz-Jeghers syndrome (STK11), MUTYH Associated Polyposis syndrome (MUTYH), and Neurofibromatosis type 1 (NF1).      Risk-reducing salpingo-oophorectomy can be considered in women with mutations in BRIP1, RAD51C, or RAD51D.  Breast cancer risk management guidelines are available for ALEM, CHEK2, PALB2, NF1, and NBN.    The remaining genes (BARD1, DICER1, MRE11A, RAD50, and SMARCA4) are associated with increased cancer risk and may allow us to make medical recommendations when mutations are identified.      Consent was obtained and genetic testing for OvaNext was sent to [facility]. Turn around time: approximately 5 weeks. A detailed handout regarding breast and gynecologic cancer risk genes and the information we discussed was provided to Klaudia at the end of our appointment today and can be found in the after visit summary.  Topics included: inheritance pattern, cancer risks, cancer screening recommendations, and also risks, benefits and limitations of testing.    The information from this test may determine cancer screening  recommendations as well as options for risk reducing surgeries for Klaudia.  These recommendations will be discussed in detail once genetic testing is completed.    Plan:  1. All of Klaudia's questions were answered.   2. Klaudia signed a consent form at the conclusion of this visit and had her blood drawn for OvaNext.   3. Klaudia will return to clinic to discuss the results.  Turn around time: about 4-5 weeks     Face to face time 20 minutes.    Dara Claros MS, [facility]  Licensed Genetic Counselor  P. [phone]  F. [phone]

## 2018-05-09 NOTE — MR AVS SNAPSHOT
After Visit Summary   5/9/2018    Celeste Arguello    MRN: [id number]           Patient Information     Date Of Birth          1985        Visit Information        Provider Department      5/9/2018 3:30 PM Dara Claros GC;  [address] [facility] [facility]        Today's Diagnoses     Family history of malignant neoplasm of ovary    -  1    Family history of malignant neoplasm of breast          Care Instructions        Assessing Cancer Risk  Only about 5-10% of cancers are thought to be due to an inherited cancer susceptibility gene.    These families often have:    Several people with the same or related types of cancer    Cancers diagnosed at a young age (before age 50)    Individuals with more than one primary cancer    Multiple generations of the family affected with cancer    Some people may be candidates for genetic testing of more than one gene.  For these families, genetic testing using a cancer panel may be offered.  These panels will test different genes known to increase the risk for breast, ovarian, uterine, and/or other cancers. All of the genes discussed below have published clinical management guidelines for individuals who are found to carry a mutation. The purpose of this handout is to serve as a brief summary of the genes analyzed by the panels used to inquire about hereditary breast and gynecologic cancer:  ALEM, BRCA1, BRCA2, BRIP1, CDH1, CHEK2, MLH1, MSH2, MSH6, PMS2, EPCAM, PTEN, PALB2, RAD51C, RAD51D, and TP53.  ______________________________________________________________________________  Hereditary Breast and Ovarian Cancer Syndrome   (BRCA1 and BRCA2)  A single mutation in one of the copies of BRCA1 or BRCA2 increases the risk for breast and ovarian cancer, among others.  The risk for pancreatic cancer and melanoma may also be slightly increased in some families.  The chart below shows the chance that someone with a BRCA mutation would develop  cancer in his or her lifetime1,2,3,4.        A person s ethnic background is also important to consider, as individuals of Ashkenazi Jehovah's witness ancestry have a higher chance of having a BRCA gene mutation.  There are three BRCA mutations that occur more frequently in this population.    Stevens Syndrome   (MLH1, MSH2, MSH6, PMS2, and EPCAM)  Currently five genes are known to cause Stevens Syndrome: MLH1, MSH2, MSH6, PMS2, and EPCAM.  A single mutation in one of the Stevens Syndrome genes increases the risk for colon, endometrial, ovarian, and stomach cancers.  Other cancers that occur less commonly in Stevens Syndrome include urinary tract, skin, and brain cancers.  The chart below shows the chance that a person with Stevens syndrome would develop cancer in his or her lifetime5.      *Cancer risk varies depending on Stevens syndrome gene found    Cowden Syndrome   (PTEN)  Cowden syndrome is a hereditary condition that increases the risk for breast, thyroid, endometrial, colon, and kidney cancer.  Cowden syndrome is caused by a mutation in the PTEN gene.  A single mutation in one of the copies of PTEN causes Cowden syndrome and increases cancer risk.  The chart below shows the chance that someone with a PTEN mutation would develop cancer in their lifetime6,7.  Other benign features seen in some individuals with Cowden syndrome include benign skin lesions (facial papules, keratoses, lipomas), learning disability, autism, thyroid nodules, colon polyps, and larger head size.      *One recent study found breast cancer risk to be increased to 85%    Li-Fraumeni Syndrome   (TP53)  Li-Fraumeni Syndrome (LFS) is a cancer predisposition syndrome caused by a mutation in the TP53 gene. A single mutation in one of the copies of TP53 increases the risk for multiple cancers. Individuals with LFS are at an increased risk for developing cancer at a young age. The lifetime risk for development of a LFS-associated cancer is 50% by age 30 and 90% by  age 60.     Core Cancers: Sarcomas, Breast, Brain, Lung, Leukemias/Lymphomas, Adrenocortical carcinomas  Other Cancers: Gastrointestinal, Thyroid, Skin, Genitourinary    Hereditary Diffuse Gastric Cancer   (CDH1)  Currently, one gene is known to cause hereditary diffuse gastric cancer (HDGC): CDH1.  Individuals with HDGC are at increased risk for diffuse gastric cancer and lobular breast cancer. Of people diagnosed with HDGC, 30-50% have a mutation in the CDH1 gene.  This suggests there are likely other genes that may cause HDGC that have not been identified yet.      Lifetime Cancer Risks    General Population HDGC    Diffuse Gastric  <1% ~80%   Breast 12% 39-52%         Additional Genes  ALEM  ALEM is a moderate-risk breast cancer gene. Women who have a mutation in ALEM can have between a 2-4 fold increased risk for breast cancer compared to the general population8. ALEM mutations have also been associated with increased risk for pancreatic cancer, however an estimate of this cancer risk is not well understood9. Individuals who inherit two ALEM mutations have a condition called ataxia-telangiectasia (AT).  This rare autosomal recessive condition affects the nervous system and immune system, and is associated with progressive cerebellar ataxia beginning in childhood.  Individuals with ataxia-telangiectasia often have a weakened immune system and have an increased risk for childhood cancers.    PALB2  Mutations in PALB2 have been shown to increase the risk of breast cancer up to 33-58% in some families; where individuals fall within this risk range is dependent upon family fwskzsd14. PALB2 mutations have also been associated with increased risk for pancreatic cancer, although this risk has not been quantified yet.  Individuals who inherit two PALB2 mutations--one from their mother and one from their father--have a condition called Fanconi Anemia.  This rare autosomal recessive condition is associated with short stature,  developmental delay, bone marrow failure, and increased risk for childhood cancers.    CHEK2   CHEK2 is a moderate-risk breast cancer gene.  Women who have a mutation in CHEK2 have around a 2-fold increased risk for breast cancer compared to the general population, and this risk may be higher depending upon family history.11,12,13 Mutations in CHEK2 have also been shown to increase the risk of a number of other cancers, including colon and prostate, however these cancer risks are currently not well understood.    BRIP1, RAD51C and RAD51D  Mutations in BRIP1, RAD51C, and RAD51D have been shown to increase the risk of ovarian cancer and possibly female breast cancer as well14,15 .       Lifetime Cancer Risk    General Population BRIP1 RAD51C RAD51D   Ovarian 1-2% ~5-8% ~5-9% ~7-15%               Inheritance  All of the cancer syndromes reviewed above are inherited in an autosomal dominant pattern.  This means that if a parent has a mutation, each of his or her children will have a 50% chance of inheriting that same mutation.  Therefore, each child--male or female--would have a 50% chance of being at increased risk for developing cancer.      Image obtained from Genetics Home Reference, 2013     Mutations in some genes can occur de veronica, which means that a person s mutation occurred for the first time in them and was not inherited from a parent.  Now that they have the mutation, however, it can be passed on to future generations.    Genetic Testing  Genetic testing involves a blood test and will look at the genetic information in the ALEM, BRCA1, BRCA2, BRIP1, CDH1, CHEK2, MLH1, MSH2, MSH6, PMS2, EPCAM, PTEN, PALB2, RAD51C, RAD51D, and TP53 genes for any harmful mutations that are associated with increased cancer risk.  If possible, it is recommended that the person(s) who has had cancer be tested before other family members.  That person will give us the most useful information about whether or not a specific gene is  associated with the cancer in the family.    Results  There are three possible results of genetic testing:    Positive--a harmful mutation was identified in one or more of the genes    Negative--no mutation was identified in any of the genes on this panel    Variant of unknown significance--a variation in one of the genes was identified, but it is unclear how this impacts cancer risk in the family    Advantages and Disadvantages   There are advantages and disadvantages to genetic testing.    Advantages    May clarify your cancer risk    Can help you make medical decisions    May explain the cancers in your family    May give useful information to your family members (if you share your results)    Disadvantages    Possible negative emotional impact of learning about inherited cancer risk    Uncertainty in interpreting a negative test result in some situations    Possible genetic discrimination concerns (see below)    Genetic Information Nondiscrimination Act (EDUAR)  EDUAR is a federal law that protects individuals from health insurance or employment discrimination based on a genetic test result alone.  Although rare, there are currently no legal discrimination protections in terms of life insurance, long term care, or disability insurances.  Visit the National Human Genome Research Waucoma website to learn more.    Reducing Cancer Risk  All of the genes described above have nationally recognized cancer screening guidelines that would be recommended for individuals who test positive.  In addition to increased cancer screening, surgeries may be offered or recommended to reduce cancer risk.  Recommendations are based upon an individual s genetic test result as well as their personal and family history of cancer.    Questions to Think About Regarding Genetic Testing:    What effect will the test result have on me and my relationship with my family members if I have an inherited gene mutation?  If I don t have a gene  mutation?    Should I share my test results, and how will my family react to this news, which may also affect them?    Are my children ready to learn new information that may one day affect their own health?    Hereditary Cancer Resources    FORCE: Facing Our Risk of Cancer Empowered facingourrisk.org   Bright Pink bebrightpink.org   Li-Fraumeni Syndrome Association lfsassociation.org   PTEN World PTENworld.Prime Advantage   No stomach for cancer, Inc. nostoBath VA Medical Centerforcancer.org   Stomach cancer relief network Scrnet.org   Collaborative Group of the Americas on Inherited Colorectal Cancer (CGA) cgaicc.com    Cancer Care cancercare.org   American Cancer Society (ACS) cancer.org   National Cancer Atkins (NCI) cancer.gov     Cancer Risk Management Program [phone] ([phone])  ? Nita Mary, MS, [facility]  [phone]  ? Dara Claros, MS, [facility]  [phone]  ? Imelda Ellsworth, MS, [facility]  [phone]  ? Divine Bales, MS, [facility]  [phone]  ? Mari Edward, MS, [facility]  [phone]    References  1. Amol Torres PDP, Anna S, Iftikhar REDD, Lena JE, Kimberly JL, Lalo N, Shun H, Alivia O, Lupis A, Sarita B, Sunday P, Tory S, Tal DM, Coleman N, Natalia E, Reginald H, Rolando E, Lubinski J, Gronwald J, Juan B, Krista H, Thorlacius S, Eerola H, Rhea H, Lavon K, Wing OP. Average risks of breast and ovarian cancer associated with BRCA1 or BRCA2 mutations detected in case series unselected for family history: a combined analysis of 222 studies. Am J Hum Teodora. 2003;72:1117-30.  2. Lyn TALLEY, Homa SHIRLEY, Tyson CARNES.  BRCA1 and BRCA2 Hereditary Breast and Ovarian Cancer. Gene Reviews online. 2013.  3. Alber YC, Renny S, Roger G, Richardson S. Breast cancer risk among male BRCA1 and BRCA2 mutation carriers. J Natl Cancer Inst. 2007;99:1811-4.  4. Johnathon DE LEON, Kvng I, Andrews J, Delta E, Carlos JACK, Tracy F. Risk of breast cancer in male BRCA2 carriers. J Med Teodora. 2010;47:710-1.  5. National Comprehensive  Cancer Network. Clinical practice guidelines in oncology, colorectal cancer screening. Available online (registration required). 2015.  6. Gustafson MH, Garrison J, Denny J, Froylan LA, Paulie MS, Tomy C. Lifetime cancer risks in individuals with germline PTEN mutations. Clin Cancer Res. 2012;18:400-7.  7. Trupti OCAMPO. Cowden Syndrome: A Critical Review of the Clinical Literature. J Teodora . 2009:18:13-27.  8. Devin SOLIS, Dilip D, Chele S, Brigitte P, Darnell T, Ana M, Andrea B, Farrah H, Reymundo R, Nigel K, Lorena L, Johnathon DG, Tal D, Raymond DF, Maxim MR, The Breast Cancer Susceptibility Collaboration (UK) & Lance N. ALEM mutations that cause ataxia-telangiectasia are breast cancer susceptibility alleles. Nature Genetics. 2006;38:873-875  9. Archie N , Nabil Y, Alta J, Kinga L, Kole GM , Poli ML, Gallinger S, Gloria AG, Syngal S, Hima ML, Debbie J , Juhi R, Noemi SZ, Braden JR, Luba VE, Vonda M, Vogelstein B, Devante N, Preston RH, Rupali KW, and Moreno AP. ALEM mutations in patients with hereditary pancreatic cancer. Cancer Discover. 2012;2:41-46  10. Denise HOPPER, et al. Breast-Cancer Risk in Families with Mutations in PALB2. NEJM. 2014; 371(6):497-506.  11. CHEK2 Breast Cancer Case-Control Consortium. CHEK2*1100delC and susceptibility to breast cancer: A collaborative analysis involving 10,860 breast cancer cases and 9,065 controls from 10 studies. Am J Hum Teodora, 74 (2004), pp. 4170-1969  12. Stu T, Sri S, Christina K, et al. Spectrum of Mutations in BRCA1, BRCA2, CHEK2, and TP53 in Families at High Risk of Breast Cancer. BALJIT. 2006;295(12):1636-6632.   13. Kevin MISHRA, Yeimy KEBEDE, Radha SOLIS, et al. Risk of breast cancer in women with a CHEK2 mutation with and without a family history of breast cancer. J Clin Oncol. 2011;29:0950-8906.  14. Kwabena H, Benson E, Radhames BARRAZA, et al. Contribution of germline mutations in the RAD51B, RAD51C, and RAD51D genes to ovarian cancer in  the population. J Clin Oncol. 2015;33(26):1136-9193. Doi:10.1200/JCO.2015.61.2408.  15. Rachael RAYA, Jill PAIGE, Zainab P, et al. Mutations in BRIP1 confer high risk of ovarian cancer. Chanell Teodora. 2011;43(11):3893-9083. doi:10.1038/ng.955.            Follow-ups after your visit        Your next 10 appointments already scheduled     May 09, 2018  4:00 PM CDT   [facility] Draw with  [facility] DRAW   [facility] ([facility]    [address]  [address]  [place] [address] [phone]              Future tests that were ordered for you today     Open Future Orders        Priority Expected Expires Ordered    [facility] Test: Laboratory Miscellaneous Order Routine  5/9/2019 5/9/2018            Who to contact     If you have questions or need follow up information about today's clinic visit or your schedule please contact [facility] directly at [phone].  Normal or non-critical lab and imaging results will be communicated to you by MyChart, letter or phone within 4 business days after the clinic has received the results. If you do not hear from us within 7 days, please contact the clinic through [email or web address] or phone. If you have a critical or abnormal lab result, we will notify you by phone as soon as possible.  Submit refill requests through Think Passenger or call your pharmacy and they will forward the refill request to us. Please allow 3 business days for your refill to be completed.          Additional Information About Your Visit        Wifi Onlinehart Information     Think Passenger gives you secure access to your electronic health record. If you see a primary care provider, you can also send messages to your care team and make appointments. If you have questions, please call your primary care clinic.  If you do not have a primary care provider, please call [phone] and they will assist you.        Care EveryWhere ID     This is your Care EveryWhere  ID. This could be used by other organizations to access your [place] medical records  [id number]         Blood Pressure from Last 3 Encounters:   04/26/18 125/78   03/16/17 113/71   12/22/16 117/77    Weight from Last 3 Encounters:   04/26/18 54 kg (119 lb)   03/16/17 53.5 kg (118 lb)   12/22/16 52.4 kg (115 lb 9.6 oz)               Primary Care Provider Office Phone # Fax #    [facility] [phone] [address] [address]        Equal Access to Services     [facility] : Hadii liam jennings hadasho Somoiz, waaxda luqadaha, qaybta kaalmada adedianayada, edgardo thomas. So [facility] [phone].    ATENCIÓN: Si habla español, tiene a de jesus disposición servicios gratuitos de asistencia lingüística. [facility] [phone].    We comply with applicable federal civil rights laws and [place] laws. We do not discriminate on the basis of race, color, national origin, age, disability, sex, sexual orientation, or gender identity.            Thank you!     Thank you for choosing [facility]  for your care. Our goal is always to provide you with excellent care. Hearing back from our patients is one way we can continue to improve our services. Please take a few minutes to complete the written survey that you may receive in the mail after your visit with us. Thank you!             Your Updated Medication List - Protect others around you: Learn how to safely use, store and throw away your medicines at www.disposemymeds.org.          This list is accurate as of 5/9/18  3:59 PM.  Always use your most recent med list.                   Brand Name Dispense Instructions for use Diagnosis    citalopram 20 MG tablet    celeXA    90 tablet    Take 1 tablet (20 mg) by mouth daily    Adjustment disorder with anxious mood       Fluocinolone Acetonide Scalp 0.01 % Oil oil      APPLY TO SCALP TWICE A DAY FOR UP TO 3 WEEKS. WAIT 1 WEEK BETWEEN TREATMENTS.         ketoconazole 2 % shampoo    NIZORAL     LATHER IN SCALP FOR 5 MINUTES PRIOR TO RINSING. USE EVERY OTHER DAY FOR 1 WEEK.        meclizine 25 MG tablet    ANTIVERT     Take 25 mg by mouth 3 times daily as needed        * MIRENA (52 MG) 20 MCG/24HR IUD   Generic drug:  levonorgestrel      1 Device by Intrauterine route Was inserted [facility]  2-        * levonorgestrel 20 MCG/24HR IUD    MIRENA     1 Device by Intrauterine route        norgestimate-ethinyl estradiol 0.25-35 MG-MCG per tablet    ORTHO-CYCLEN, SPRINTEC    84 tablet    Take active pill daily.    Family history of ovarian cancer, Endometriosis, Oral contraceptive use       ondansetron 4 MG ODT tab    ZOFRAN-ODT     Take 4 mg by mouth every 12 hours        * Notice:  This list has 2 medication(s) that are the same as other medications prescribed for you. Read the directions carefully, and ask your doctor or other care provider to review them with you.

## 2018-05-09 NOTE — NURSING NOTE
Chief Complaint   Patient presents with     Blood Draw     Labs drawn via  by SUNDAR Spaulding, RN

## 2018-05-09 NOTE — LETTER
"    Cancer Risk Management  Program Locations    [facility]  [facility]  [facility]  [facility]  Mailing Address  Cancer Risk Management Program  [facility]  [address] [place] [address]    New patient appointments  [phone]  May 10, 2018    Celeste Arguello  [address]  [address]  [facility] [address]      Dear Celeste,    It was a pleasure meeting with you again at the [facility] on 5/9/2018.  Here is a copy of the progress note from your recent genetic counseling visit to the Cancer Risk Management Program.  If you have any additional questions, please feel free to call.    Referring Provider: self-referred    Presenting Information:   Celeste (\"Klaudia\") Saundra came in to clinic for a follow-up visit today. She had previously been seen in the Cancer Risk Management Program at the [facility] on 2/1/2017.  At that time, we had tried to obtain a copy of her mother's genetic test report, and Klaudia had thought that her mother had genetic testing. This was unable to be obtained. Now, Klaudia feels that she is ready to undergo genetic testing.   Please see my clinic note from 2/1/2017. Klaudia reports no changes to her personal or family history of cancer.     Discussion:    We previously reviewed the details of Klaudia's family and personal history. See note from previous appointment for details.     We again reviewed that mutations in either BRCA1 or BRCA2 cause Hereditary Breast and Ovarian Cancer syndrome (HBOC).  Women with HBOC have an increased risk for breast and ovarian cancer.  There is also an increased risk for prostate and breast cancer among males with a BRCA1/BRCA2 mutation.  Males and females with BRCA1/BRCA2 mutations also face a slightly increased risk for pancreatic cancer.    We also briefly discussed Stevens " syndrome, as ovarian cancer is a Stevens syndrome cancer. Stevens syndrome can be caused by a mutation in one of five genes:  MLH1, MSH2, MSH6, PMS2, and EPCAM.  Stevens syndrome may present with polyps, but typically a small number.  The highest cancer risks associated with Stevens syndrome include colon cancer, endometrial/uterine cancer, gastric cancer, and ovarian cancer.    We discussed that there are other genes that can cause increased risk for breast and/or gynecologic cancer. As we do not have a test report available from Klaudia's relatives, we discussed that panel testing could be considered. We reviewed genetic testing options for hereditary gynecologic cancers: actionable high/moderate risk breast and gynecologic cancer risk custom panel (CustomNext-Cancer, 16 genes, a combination of GynPlus and BRCAplus) and expanded high and moderate risk panel testing (OvaNext, 25 genes).  Celeste expressed an interest in learning as much information as possible from the testing. She opted for the OvaNext panel.    Genetic testing is available for 25 genes associated with hereditary gynecologic cancers: OvaNext (ALEM, BARD1, BRCA1, BRCA2, BRIP1, CDH1, CHEK2, DICER1, EPCAM, MLH1, MRE11A, MSH2, MSH6, MUTYH, NBN, NF1, PALB2, PMS2, PTEN, RAD50, RAD51C, RAD51D, SMARCA4, STK11, and TP53).    We discussed that some of the genes in the OvaNext panel are associated with specific hereditary cancer syndromes and have published management guidelines: Hereditary Breast and Ovarian Cancer syndrome (BRCA1, BRCA2), Stevens syndrome (MLH1, MSH2, MSH6, PMS2, EPCAM), Hereditary Diffuse Gastric Cancer (CDH1), Cowden syndrome (PTEN), Li Fraumeni syndrome (TP53), Peutz-Jeghers syndrome (STK11), MUTYH Associated Polyposis syndrome (MUTYH), and Neurofibromatosis type 1 (NF1).      Risk-reducing salpingo-oophorectomy can be considered in women with mutations in BRIP1, RAD51C, or RAD51D.  Breast cancer risk management guidelines are available for ALEM,  CHEK2, PALB2, NF1, and NBN.    The remaining genes (BARD1, DICER1, MRE11A, RAD50, and SMARCA4) are associated with increased cancer risk and may allow us to make medical recommendations when mutations are identified.      Consent was obtained and genetic testing for OvaNext was sent to [facility]. Turn around time: approximately 5 weeks. A detailed handout regarding breast and gynecologic cancer risk genes and the information we discussed was provided to Klaudia at the end of our appointment today and can be found in the after visit summary.  Topics included: inheritance pattern, cancer risks, cancer screening recommendations, and also risks, benefits and limitations of testing.    The information from this test may determine cancer screening recommendations as well as options for risk reducing surgeries for Klaudia.  These recommendations will be discussed in detail once genetic testing is completed.    Plan:  1. All of Klaudia's questions were answered.   2. Klaudia signed a consent form at the conclusion of this visit and had her blood drawn for OvaNext.   3. Klaudia will return to clinic to discuss the results.  Turn around time: about 4-5 weeks     Face to face time 20 minutes.    Dara Claros MS, [facility]  Licensed Genetic Counselor  P. [phone]  F. [phone]

## 2018-06-14 ENCOUNTER — MYC MEDICAL ADVICE (OUTPATIENT)
Dept: OBGYN | Facility: CLINIC | Age: 33
End: 2018-06-14

## 2018-06-14 DIAGNOSIS — Z71.6 ENCOUNTER FOR SMOKING CESSATION COUNSELING: Primary | ICD-10-CM

## 2018-06-15 ENCOUNTER — OFFICE VISIT (OUTPATIENT)
Dept: ONCOLOGY | Facility: CLINIC | Age: 33
End: 2018-06-15
Attending: GENETIC COUNSELOR, MS
Payer: COMMERCIAL

## 2018-06-15 DIAGNOSIS — Z15.09 BRCA1 GENE MUTATION POSITIVE: Primary | ICD-10-CM

## 2018-06-15 DIAGNOSIS — Z80.41 FAMILY HISTORY OF OVARIAN CANCER: ICD-10-CM

## 2018-06-15 DIAGNOSIS — Z15.01 BRCA1 GENE MUTATION POSITIVE: Primary | ICD-10-CM

## 2018-06-15 PROCEDURE — 96040 ZZH GENETIC COUNSELING, EACH 30 MINUTES: CPT | Mod: ZF | Performed by: GENETIC COUNSELOR, MS

## 2018-06-15 NOTE — LETTER
Cancer Risk Management  Program Locations    [facility]  [facility]  Mailing Address  Cancer Risk Management Program  [facility]  [address] [place] [address]    New patient appointments  [phone]  Camille 15, 2018    Celeste Arguello  [address]  [address]  [facility] [address]      Dear Celeste,    It was a pleasure meeting with you again at the [facility] on 6/15/2018.  Here is a copy of the progress note from your recent genetic counseling visit.  As always, if you have any additional questions, please feel free to call me directly.    Referring Provider: self-referred    Presenting Information:   Celeste returned to the Cancer Risk Management Program at the [facility] to discuss her genetic testing results. Her blood was drawn on 5/9/2018 and we ordered OvaNext testing from [facility]. This testing was done because of her family history of ovarian and breast cancer. She is here with her friend Olga narayan.    Genetic Testing Results: BRCA1 POSITIVE   Celeste is POSITIVE for a BRCA1 mutation. Specifically her mutation is called c.181T>G (p.C61G). 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  It is most likely that this is the same mutation that was found in her maternal relatives, as Celeste had reported that maternal relatives carry a BRCA1 mutation. We discussed the impact of this testing on Celeste in detail.     Of note, Celeste tested negative for mutations in the following genes by sequencing and deletion/duplication analysis: ALEM, BARD1, BRCA1, BRCA2, BRIP1, CDH1, CHEK2, DICER1, EPCAM, MLH1, MRE11A, MSH2, MSH6, MUTYH, NBN, NF1, PALB2, PMS2, PTEN, RAD50, RAD51C, RAD51D, SMARCA4, STK11, and TP53 genes.  This test involved sequencing and deletion/duplication analysis of all genes with the exception of EPCAM (deletions/duplications only).  We reviewed the autosomal dominant inheritance of these genes.  Celeste cannot pass on a mutation in any of these genes to her children based on this test result.  Mutations in these genes do not skip generations.      BRCA1 Cancer Risks:  Women with mutations in BRCA1 have a:    57-87% lifetime risk of developing breast cancer. This is much greater than the general population breast cancer risks of 12%    Up to 40% lifetime risk of developing ovarian cancer. This is much greater than the general population ovarian cancer risks of 1-2%    Men with mutations in BRCA1 have a:      Increased lifetime prostate cancer risk, above the general population risk of approximately 16%.      Lifetime male breast cancer risk to age 70 of 1.2%, which is greater than the general population lifetime risk of less than 1%.     Lifetime pancreatic cancer risk and melanoma risk in both males and females may be increased as well.  Though no exact lifetime risks are available at this time, there may be an increased risk for other cancers.     Cancer Screening and Prevention:  The following screening is recommended for women who have a mutation in the BRCA1 gene.    Breast awareness starting at age 18    Clinical breast exams starting at age 25; repeated every 6-12 months    Annual breast MRI from age 25-29    Annual mammograms and breast MRI alternating every 6 months starting at age 30    Consider transvaginal ultrasound and a  blood test starting at age 30-35, though the benefits of this screening are unclear.    We discussed that prophylactic mastectomy is a surgical option, which reduces breast cancer risk by 90%.  Chemoprevention with Tamoxifen can reduce breast cancer risk in some women.  Surgical risk reduction options and Tamoxifen use should be discussed with Celeste's physician.      There  are significant limitations of screening for ovarian cancer.  Thus, prophylactic removal of the ovaries and fallopian tubes is an option and is recommended after women are finished planning their families or between 35 and 40.  As with any surgery, risks are involved, and this option should be discussed in greater detail with a gyn-oncologist.      We discussed that using oral contraceptives for five years or more has been shown to reduce ovarian cancer risk by around 50%.  The data are still inconclusive as to whether or not using oral contraceptives will increase breast cancer risk in BRCA mutation carriers.     The following screening is recommended for men who have a mutation in the BRCA1 gene:    Breast self-exam starting at age 35    Annual clinical breast exams starting at age 35    Consider prostate cancer screening starting at age 45.    Screening for pancreatic cancer is not offered on a routine basis, as the benefits of current screening methods are unknown.  Pancreatic cancer screening may be considered based on family history. Of note, Celeste does not have a family history of pancreatic cancer.  Screening for melanoma may also be considered.  Some chemotherapies for certain cancers may be more effective in individuals with BRCA mutations. she should discuss this with her physicians, if chemotherapy is indicated for her in the future.     We discussed that Celeste could participate in our Cancer Risk Management Program in which our clinical nurse specialist provides an individual screening plan and assists with medical management. A referral was made to see ENRIQUE Boland, for this service.    The above information is based on our current understanding of Celeste's genetic findings.  Celeste is encouraged to reach out to me as needed regarding any pertinent updates to her personal and/or family history of cancer, as our understanding of the genetic findings in her family may change over time.  "    Implications for Family Members:  We reviewed that mutations in BRCA1 are inherited in an autosomal dominant pattern. This means that each of Celeset's children have a 50% chance of inheriting the same mutation.  Likewise, each of her brothers also has a 50% risk of having the same mutation. I am happy to help her relatives connect with a genetic counselor in their area if they would like to discuss testing.    In rare situations in which both parents have a mutation in the BRCA1 gene, their children each have a 25% risk for Fanconi anemia-like syndrome. Fanconi anemia is a rare condition with onset in childhood.  Fanconi anemia often results in physical abnormalities, growth retardation, bone marrow failure, and increased risk for cancer.  For individuals of childbearing age with BRCA1 mutations, genetic counseling and genetic testing may be advised for their partners.     If Celeste is interested in having biological children in the future, there are different testing options both before (i.e. preimplantation genetic diagnosis) and during (i.e. chorionic villus sampling and genetic amniocentesis) a pregnancy to reduce the chance of having a child that carries this BRCA1 mutation. Other family planning options include pregnancy without intervention, adoption, and egg donation. These options can be discussed in greater detail if she has questions in the future.    Support Resources:  I provided Celeste with national and local support resources, which can be found in the after-visit summary.    Plan:  1.  I provided Celeste with a copy of her test results and support resources today.  2.  I will provide a \"Dear Relative\" letter for Celeste to share with her family members.  3.  She plans to follow up with her physicians as needed.  4. A referral will be placed to Miesha Flowers.     If  Celeste has additional questions, I encouraged her to contact  me directly at [phone].     Time spent face to face: 20 " minutes.    Dara Claros MS, [facility]  Licensed Genetic Counselor  P. [phone]  F. [phone]

## 2018-06-15 NOTE — MR AVS SNAPSHOT
After Visit Summary   6/15/2018    Celeste Arguello    MRN: [id number]           Patient Information     Date Of Birth          1985        Visit Information        Provider Department      6/15/2018 1:00 PM Dara Claros GC;  [facility]        Care Instructions        Assessing Cancer Risk  Only about 5-10% of cancers are thought to be due to an inherited cancer susceptibility gene.    These families often have:    Several people with the same or related types of cancer    Cancers diagnosed at a young age (before age 50)    Individuals with more than one primary cancer    Multiple generations of the family affected with cancer    BRCA1 and BRCA2 Genes  We each inherit two copies of every gene in our bodies: one from our mother, and one from our father.  Each gene has a specific job to do.  When a gene has a mistake or  mutation  in it, it does not work like it should.  Everyone has two copies of BRCA1 and two copies of BRCA2.  A single mutation in one of the copies of BRCA1 or BRCA2 increases the risk for breast and ovarian cancer, among others.  The risk for pancreatic cancer and melanoma may also be slightly increased in some families.  The tables below list the chance that someone with a BRCA mutation would develop cancer in his or her lifetime1,2,3,4.      Women   Men    General Population BRCA +   General Population BRCA +   Breast 12% 40-85%  Breast <1% ~7%   Ovarian 1-2% 10-40%  Prostate 16% 20%     Inheriting a mutation does not mean a person will develop cancer, but it does significantly increase his or her risk above the general population risk.    A person s ethnic background is also important to consider, as individuals of Ashkenazi Roman Catholic ancestry have a higher chance of having a BRCA gene mutation.  There are three BRCA mutations that occur more frequently in this population.     Genetic Testing  Genetic testing involves a simple blood  test and will look at the genetic information in the BRCA1 and BRCA2 genes for any harmful mutations that are associated with increased cancer risk.  If possible, it is recommended that the person(s) who has had cancer be tested before other family members.  That person will give us the most useful information about whether or not a specific gene is associated with the cancer in the family.     Results  There are three possible results of BRCA1 and BRCA2 genetic testing:    Positive--a harmful mutation was identified    Negative--no mutation was identified    Variant of unknown significance--a variation in one of the genes was identified, but it is unclear how this impacts cancer risk in the family  Advantages and Disadvantages  There are advantages and disadvantages to genetic testing of these genes.    Advantages    May clarify your cancer risk    Can help you make medical decisions    May explain the cancers in your family    May give useful information to your family members (if you share your results)    Disadvantages    Possible negative emotional impact of learning about inherited cancer risk    Uncertainty in interpreting a negative test result in some situations    Possible genetic discrimination concerns (see below)    Inheritance   BRCA1 and BRCA2 mutations are inherited in an autosomal dominant pattern.  This means that if a parent has a mutation, each of his or her children will have a 50% chance of inheriting that same mutation.  Therefore, each child--male or female--would have a 50% chance of being at increased risk for developing cancer.                                              Image obtained from Genetics Home Reference, 2013     Genetic Information Nondiscrimination Act (EDUAR)  EDUAR is a federal law that protects individuals from health insurance or employment discrimination based on a genetic test result alone.  Although rare, there are currently no legal protections in terms of life  insurance, long term care, or disability insurances.  Visit the National Human Genome Research [place] at Genome.gov/21588303 to learn more.    Reducing Cancer Risk  Current screening recommendations for women with a BRCA mutation include1:    Breast:  o Breast awareness starting at age 18  o Clinical breast exams starting at age 25; repeated every 6-12 months  o Annual breast MRI starting at age 25 (or possibly younger)  o Annual mammogram and breast MRI at age 30 (for women with and without a breast cancer history)  o Consideration of preventative mastectomy    Ovarian:   o Consideration of transvaginal ultrasound and CA-125 blood test starting at age 30 (or possibly younger); repeated every 6 months  o Recommendation for surgery to remove the ovaries and fallopian tubes after child bearing or by age 35-40     Current screening recommendations for men with a BRCA mutation include1:    Breast:  o Self-breast exams starting at age 35  o Annual clinical breast exams starting at age 35    Prostate:   o Recommend prostate cancer screening starting at age 45 for BRCA2 carriers  o Consider prostate cancer screening starting at age 45 for BRCA1 carriers    Both men and women with BRCA mutations should talk to their doctor or genetic counselor about screening for pancreatic cancer and melanoma.  In addition, the age at which to start screening may be modified based on family history of young cancer.    Questions to Think About Regarding Genetic Testing    What effect will the test result have on me and my relationship with my family members if I have an inherited gene mutation?  If I don t have a gene mutation?    Should I share my test results, and how will my family react to this news, which may also affect them?    Are my children ready to learn new information that may one day affect their own health?    Resources  FORCE: Facing Our Risk of Cancer Empowered facingOur Lady of Lourdes Regional Medical Centerrisk.org   Bright Pink bebrightpink.org   American  Cancer Society (ACS) cancer.org   National Cancer [place] (NCI) cancer.gov     Please call us if you have any questions or concerns.     Cancer Risk Management Program [phone] ([phone]  ? Nita Hernandez, MS, [facility]  [phone]  ? Dara Claros, MS [facility]          [phone]  ? Imelda Ellsworth, MS, [facility]  [phone]  ? Divine Bales, MS, [facility]  [phone]  ? Mari Edward, MS, [facility]  [phone]    References:  1. National Comprehensive Cancer Network. Clinical practice guidelines in oncology, colorectal cancer screening. Available online (registration required). 2013.  Resources for Hereditary Breast and Ovarian Cancer  Bright Pink    www.[employer].org  Bright Pink is the only national non-profit organization focused on prevention and early detection of breast and ovarian cancer in young women.  Bright Pink aims to reach the 52 million young women in the [place] between the ages of 18 and 45 with innovative, life-saving breast and ovarian health programs, thereby empowering this and future generations of women to live healthier, happier, and longer lives.  FORCE: Facing Our Risk of Cancer Empowered  www.Crunched.org  FORCE s mission is to improve the lives of individuals and families affected by hereditary breast, ovarian, and related cancers by creating awareness, supplying information and support to the community, advocating for and supporting research, and working with the research and medical communities.     Helpline: 1-865.255.4249    Message boards    Peer Navigation and local support groups  Talking About BRCA in Your Family Tree  http://www.facing[place] of [place].org/understanding-brca-and-hboc/publications/documents/fanckvm-zwzghey-nmlts-brca-family.pdf  MOCA: [place] Ovarian Cancer [place] http://mnovarian.org/  MOCA was started in 1999 by a small group of ovarian cancer survivors who came together to fund ovarian cancer research, raise awareness of the disease, and provide support to women with  ovarian cancer and their families.  Firefly Sisterhood   www.fireflysisterhood.org  Based in the [facility], the Firefly Sisterhood connects women diagnosed with breast cancer with trained breast cancer survivors to offer support, guidance and hope.  Reginald Ritot [place] www.gideonsclubtwincities.org  Gideon s Club [place] is a 501(c)3 nonprofit and the local affiliate of the Cancer Support Community, a network of more than 54 supportive, free and welcoming  clubhouses  where everyone living with cancer can come for social, emotional and psychological support. Clubs are healing environments where individuals learn from each other with guidance from licensed professionals.  BRCA Information Support Group  People with BRCA1 or BRAC2 mutations face complex emotional challenges and complicated medical decisions due to high cancer risks and their impact on individuals and families. This group brings people with a BRAC1 or BRAC2 genetic diagnosis together with others facing similar challenges. The group meets nine times per year. For information, please contact [email or web address] or call [phone].  Books:  The Breast Reconstruction Guidebook: Issues and Answers from Research to Recovery, Christelle Dickson  Confronting Hereditary Breast and Ovarian Cancer: Identify Your Risk, Understand Your Options, Change Your Alysa, Ronda Thuy            Follow-ups after your visit        Who to contact     If you have questions or need follow up information about today's clinic visit or your schedule please contact [facility] directly at [phone].  Normal or non-critical lab and imaging results will be communicated to you by MyChart, letter or phone within 4 business days after the clinic has received the results. If you do not hear from us within 7 days, please contact the clinic through MyChart or phone. If you have a critical or abnormal lab result, we will notify you by phone as soon  as possible.  Submit refill requests through [employer] or call your pharmacy and they will forward the refill request to us. Please allow 3 business days for your refill to be completed.          Additional Information About Your Visit        Lat49hart Information     [employer] gives you secure access to your electronic health record. If you see a primary care provider, you can also send messages to your care team and make appointments. If you have questions, please call your primary care clinic.  If you do not have a primary care provider, please call [phone] and they will assist you.        Care EveryWhere ID     This is your Care EveryWhere ID. This could be used by other organizations to access your [facility] medical records  [id number]         Blood Pressure from Last 3 Encounters:   04/26/18 125/78   03/16/17 113/71   12/22/16 117/77    Weight from Last 3 Encounters:   04/26/18 54 kg (119 lb)   03/16/17 53.5 kg (118 lb)   12/22/16 52.4 kg (115 lb 9.6 oz)              Today, you had the following     No orders found for display       Primary Care Provider Office Phone # Fax #    [facility] [phone]       [address] [address] [facility] [address]        Equal Access to Services     MUMTAZ ABURTO : Hadii aad ku hadasho Soomaali, waaxda luqadaha, qaybta kaalmada adeegyada, waxay adriánin haycatyn jackson thomas. So [facility] [phone].    ATENCIÓN: Si habla español, tiene a de jesus disposición servicios gratuitos de asistencia lingüística. Llame al [phone].    We comply with applicable federal civil rights laws and [place] laws. We do not discriminate on the basis of race, color, national origin, age, disability, sex, sexual orientation, or gender identity.            Thank you!     Thank you for choosing [facility]  for your care. Our goal is always to provide you with excellent care. Hearing back from our patients is one way we can continue to improve our  services. Please take a few minutes to complete the written survey that you may receive in the mail after your visit with us. Thank you!             Your Updated Medication List - Protect others around you: Learn how to safely use, store and throw away your medicines at www.disposemymeds.org.          This list is accurate as of 6/15/18  1:00 PM.  Always use your most recent med list.                   Brand Name Dispense Instructions for use Diagnosis    citalopram 20 MG tablet    celeXA    90 tablet    Take 1 tablet (20 mg) by mouth daily    Adjustment disorder with anxious mood       Fluocinolone Acetonide Scalp 0.01 % Oil oil      APPLY TO SCALP TWICE A DAY FOR UP TO 3 WEEKS. WAIT 1 WEEK BETWEEN TREATMENTS.        ketoconazole 2 % shampoo    NIZORAL     LATHER IN SCALP FOR 5 MINUTES PRIOR TO RINSING. USE EVERY OTHER DAY FOR 1 WEEK.        meclizine 25 MG tablet    ANTIVERT     Take 25 mg by mouth 3 times daily as needed        * MIRENA (52 MG) 20 MCG/24HR IUD   Generic drug:  levonorgestrel      1 Device by Intrauterine route Was inserted [facility]  2-        * levonorgestrel 20 MCG/24HR IUD    MIRENA     1 Device by Intrauterine route        nicotine polacrilex 2 MG gum    NICORETTE    150 tablet    Place 1 each (2 mg) inside cheek as needed for smoking cessation    Encounter for smoking cessation counseling       norgestimate-ethinyl estradiol 0.25-35 MG-MCG per tablet    ORTHO-CYCLEN, SPRINTEC    84 tablet    Take active pill daily.    Family history of ovarian cancer, Endometriosis, Oral contraceptive use       ondansetron 4 MG ODT tab    ZOFRAN-ODT     Take 4 mg by mouth every 12 hours        * Notice:  This list has 2 medication(s) that are the same as other medications prescribed for you. Read the directions carefully, and ask your doctor or other care provider to review them with you.

## 2018-06-15 NOTE — PATIENT INSTRUCTIONS
Assessing Cancer Risk  Only about 5-10% of cancers are thought to be due to an inherited cancer susceptibility gene.    These families often have:    Several people with the same or related types of cancer    Cancers diagnosed at a young age (before age 50)    Individuals with more than one primary cancer    Multiple generations of the family affected with cancer    BRCA1 and BRCA2 Genes  We each inherit two copies of every gene in our bodies: one from our mother, and one from our father.  Each gene has a specific job to do.  When a gene has a mistake or  mutation  in it, it does not work like it should.  Everyone has two copies of BRCA1 and two copies of BRCA2.  A single mutation in one of the copies of BRCA1 or BRCA2 increases the risk for breast and ovarian cancer, among others.  The risk for pancreatic cancer and melanoma may also be slightly increased in some families.  The tables below list the chance that someone with a BRCA mutation would develop cancer in his or her lifetime1,2,3,4.      Women   Men    General Population BRCA +   General Population BRCA +   Breast 12% 40-85%  Breast <1% ~7%   Ovarian 1-2% 10-40%  Prostate 16% 20%     Inheriting a mutation does not mean a person will develop cancer, but it does significantly increase his or her risk above the general population risk.    A person s ethnic background is also important to consider, as individuals of Ashkenazi Zoroastrianism ancestry have a higher chance of having a BRCA gene mutation.  There are three BRCA mutations that occur more frequently in this population.     Genetic Testing  Genetic testing involves a simple blood test and will look at the genetic information in the BRCA1 and BRCA2 genes for any harmful mutations that are associated with increased cancer risk.  If possible, it is recommended that the person(s) who has had cancer be tested before other family members.  That person will give us the most useful information about whether or not  a specific gene is associated with the cancer in the family.     Results  There are three possible results of BRCA1 and BRCA2 genetic testing:    Positive--a harmful mutation was identified    Negative--no mutation was identified    Variant of unknown significance--a variation in one of the genes was identified, but it is unclear how this impacts cancer risk in the family  Advantages and Disadvantages  There are advantages and disadvantages to genetic testing of these genes.    Advantages    May clarify your cancer risk    Can help you make medical decisions    May explain the cancers in your family    May give useful information to your family members (if you share your results)    Disadvantages    Possible negative emotional impact of learning about inherited cancer risk    Uncertainty in interpreting a negative test result in some situations    Possible genetic discrimination concerns (see below)    Inheritance   BRCA1 and BRCA2 mutations are inherited in an autosomal dominant pattern.  This means that if a parent has a mutation, each of his or her children will have a 50% chance of inheriting that same mutation.  Therefore, each child--male or female--would have a 50% chance of being at increased risk for developing cancer.                                              Image obtained from Genetics Home Reference, 2013     Genetic Information Nondiscrimination Act (EDUAR)  EDUAR is a federal law that protects individuals from health insurance or employment discrimination based on a genetic test result alone.  Although rare, there are currently no legal protections in terms of life insurance, long term care, or disability insurances.  Visit the National Human Genome Research [place] at Genome.gov/20251137 to learn more.    Reducing Cancer Risk  Current screening recommendations for women with a BRCA mutation include1:    Breast:  o Breast awareness starting at age 18  o Clinical breast exams starting at age 25;  repeated every 6-12 months  o Annual breast MRI starting at age 25 (or possibly younger)  o Annual mammogram and breast MRI at age 30 (for women with and without a breast cancer history)  o Consideration of preventative mastectomy    Ovarian:   o Consideration of transvaginal ultrasound and CA-125 blood test starting at age 30 (or possibly younger); repeated every 6 months  o Recommendation for surgery to remove the ovaries and fallopian tubes after child bearing or by age 35-40     Current screening recommendations for men with a BRCA mutation include1:    Breast:  o Self-breast exams starting at age 35  o Annual clinical breast exams starting at age 35    Prostate:   o Recommend prostate cancer screening starting at age 45 for BRCA2 carriers  o Consider prostate cancer screening starting at age 45 for BRCA1 carriers    Both men and women with BRCA mutations should talk to their doctor or genetic counselor about screening for pancreatic cancer and melanoma.  In addition, the age at which to start screening may be modified based on family history of young cancer.    Questions to Think About Regarding Genetic Testing    What effect will the test result have on me and my relationship with my family members if I have an inherited gene mutation?  If I don t have a gene mutation?    Should I share my test results, and how will my family react to this news, which may also affect them?    Are my children ready to learn new information that may one day affect their own health?    Resources  FORCE: Facing Our Risk of Cancer Empowered facingourrisk.org   Bright Pink bebrightpink.org   American Cancer Society (ACS) cancer.org   National Cancer [place] (NCI) cancer.gov     Please call us if you have any questions or concerns.     Cancer Risk Management Program [phone]  ? Nita Hernandez, MS, [facility] [place]  [phone]  ? Dara Claros, MS [facility]          [phone]  ? Imelda Ellsworth, MS, [facility]  [phone]  ? Divine  MS Amairani, [facility]  [phone]  ? Mari Edward MS, [facility]  [phone]    References:  1. National Comprehensive Cancer Network. Clinical practice guidelines in oncology, colorectal cancer screening. Available online (registration required). 2013.  Resources for Hereditary Breast and Ovarian Cancer  Bright Pink    www.[employer] Workout.org  Bright Pink is the only national non-profit organization focused on prevention and early detection of breast and ovarian cancer in young women.  Bright Pink aims to reach the 52 million young women in the [place] between the ages of 18 and 45 with innovative, life-saving breast and ovarian health programs, thereby empowering this and future generations of women to live healthier, happier, and longer lives.  FORCE: Facing Our Risk of Cancer Empowered  www.[employer]  FORCE s mission is to improve the lives of individuals and families affected by hereditary breast, ovarian, and related cancers by creating awareness, supplying information and support to the community, advocating for and supporting research, and working with the research and medical communities.     Helpline: 1-321.812.6519    Message boards    Peer Navigation and local support groups  Talking About BRCA in Your Family Tree  http://www.[employer]org/understanding-brca-and-hboc/publications/documents/yleslpv-vowaxaf-kacdf-brca-family.pdf  MOCA: [place] Ovarian Cancer [place] http://mnovarian.org/  MOCA was started in 1999 by a small group of ovarian cancer survivors who came together to fund ovarian cancer research, raise awareness of the disease, and provide support to women with ovarian cancer and their families.  Firefly Sisterhood   www.fireflysisterhood.org  Based in the [facility], the Firefly Sisterhood connects women diagnosed with breast cancer with trained breast cancer survivors to offer support, guidance and hope.  Emme E2MS's to be [place] www.Yubsclubtwincities.org  Emme E2MS s [facility] is a  501(c)3 nonprof and the local affiliate of the Cancer Support Community, a network of more than 54 supportive, free and welcoming  clubhouses  where everyone living with cancer can come for social, emotional and psychological support. Clubs are healing environments where individuals learn from each other with guidance from licensed professionals.  BRCA Information Support Group  People with BRCA1 or BRAC2 mutations face complex emotional challenges and complicated medical decisions due to high cancer risks and their impact on individuals and families. This group brings people with a BRAC1 or BRAC2 genetic diagnosis together with others facing similar challenges. The group meets nine times per year. For information, please contact [email or web address] or call [phone].  Books:  The Breast Reconstruction Guidebook: Issues and Answers from Research to Recovery, Christelle Dickson  Confronting Hereditary Breast and Ovarian Cancer: Identify Your Risk, Understand Your Options, Change Your Alysa, Ronda Daley

## 2018-06-15 NOTE — LETTER
Cancer Risk Management  Program Locations    [facility]  [facility]  Mailing Address  Cancer Risk Management Program  [address]  [place] [address]    New patient appointments  [phone]  6/15/2018    Dear Relative:  The purpose of this letter is to inform you that your relative recently underwent genetic counseling and genetic testing due to the family history of breast and ovarian cancer.  The testing done through [employer] identified a mutation in the BRCA1 gene.  Specifically, the mutation is called c.181T>G (p.C61G).  The accession number connected to your relative's test report is #[id number].  A mutation (or change in the genetic code) causes a specific gene to stop working properly.  Ultimately, individuals who have a mutation in this BRCA1 gene have a diagnosis of hereditary breast and ovarian cancer syndrome.    BRCA1 mutations increase the lifetime risk of breast cancer up to 50-85%.  They also increase the lifetime risk for ovarian cancer up to 20-40%.  Men with this BRCA1 mutation are at increased risk for male breast cancer and prostate cancer.  Some families with BRCA1 mutations show increased risk of pancreatic cancer or melanoma.  In addition, both men and women have a 50% chance of passing this mutation on to each of their children.  If individuals are found to have a mutation in the BRCA1 gene, we would recommend increased cancer screening at younger ages.  Risk reduction surgeries are also available options that can prevent the development of certain cancers.    In rare situations in which both parents have a mutation in the BRCA1 gene, their children each may have a 25% risk for Fanconi anemia-like syndrome. Fanconi anemia is a rare condition with onset in childhood.   Fanconi anemia often results in physical abnormalities, growth retardation, bone marrow failure, and increased risk for cancer.  For individuals of childbearing age with BRCA1 mutations, genetic counseling and genetic testing may be advised for their partners.    I understand that this may be surprising, unexpected, and even scary news.  Because this mutation has been identified in your family, you are at risk for having the same mutation.  As mentioned earlier, your children may also be at risk.  Thus, I encourage you to contact me with questions or to schedule a genetic counseling appointment.  If you do not live in the [facility] area, I would be happy to provide you with contact information for genetic counselors in your city or state.  Genetic counselors can be found by visiting [email or web address].   Scheduling a genetic counseling appointment does not mean you have to undergo genetic testing.  The decision to pursue such testing is a very personal one that is discussed in more detail during the session.  Indeed, much of cancer genetic counseling is providing valuable information to individuals who are impacted by genetic information such as this.    Please feel free to contact me with any questions or concerns.  I can be reached at [phone].  Sincerely,   Dara Claros MS, [facility]  Licensed Genetic Counselor  P. [phone]  F. [phone]

## 2018-06-15 NOTE — LETTER
"6/15/2018       RE: Celeste Arguello  [address]  [address]  [facility] [address]     Dear Colleague,    Thank you for referring your patient, Celeste Arguello, to the [facility]. Please see a copy of my visit note below.    6/15/2018    Referring Provider: self-referred    Presenting Information:   Celeste returned to the Cancer Risk Management Program at the [facility] to discuss her genetic testing results. Her blood was drawn on 5/9/2018 and we ordered OvaNext testing from [facility] [facility] [employer]. This testing was done because of her family history of ovarian and breast cancer. She is here with her friend Olga today.    Genetic Testing Results: BRCA1 POSITIVE   Celeste is POSITIVE for a BRCA1 mutation. Specifically her mutation is called c.181T>G (p.C61G). 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  It is most likely that this is the same mutation that was found in her maternal relatives, as Celeste had reported that maternal relatives carry a BRCA1 mutation. We discussed the impact of this testing on Celeste in detail.     Of note, Celeste tested negative for mutations in the following genes by sequencing and deletion/duplication analysis: ALEM, BARD1, BRCA1, BRCA2, BRIP1, CDH1, CHEK2, DICER1, EPCAM, MLH1, MRE11A, MSH2, MSH6, MUTYH, NBN, NF1, PALB2, PMS2, PTEN, RAD50, RAD51C, RAD51D, SMARCA4, STK11, and TP53 genes. This test involved sequencing and deletion/duplication analysis of all genes with the exception of EPCAM (deletions/duplications only).  We reviewed the autosomal dominant inheritance of these genes.  Celeste cannot pass on a mutation in any of these genes to her children based on this test result.  Mutations in these genes do not skip generations.      A copy of the test report can be found in the Laboratory tab, dated 5/9/2018, and named \"SEND OUTS MISC TEST\". The report is scanned in as a " linked document.    BRCA1 Cancer Risks:  Women with mutations in BRCA1 have a:    57-87% lifetime risk of developing breast cancer. This is much greater than the general population breast cancer risks of 12%    Up to 40% lifetime risk of developing ovarian cancer. This is much greater than the general population ovarian cancer risks of 1-2%    Men with mutations in BRCA1 have a:      Increased lifetime prostate cancer risk, above the general population risk of approximately 16%.      Lifetime male breast cancer risk to age 70 of 1.2%, which is greater than the general population lifetime risk of less than 1%.     Lifetime pancreatic cancer risk and melanoma risk in both males and females may be increased as well.  Though no exact lifetime risks are available at this time, there may be an increased risk for other cancers.     Cancer Screening and Prevention:  The following screening is recommended for women who have a mutation in the BRCA1 gene.    Breast awareness starting at age 18    Clinical breast exams starting at age 25; repeated every 6-12 months    Annual breast MRI from age 25-29    Annual mammograms and breast MRI alternating every 6 months starting at age 30    Consider transvaginal ultrasound and a  blood test starting at age 30-35, though the benefits of this screening are unclear.    We discussed that prophylactic mastectomy is a surgical option, which reduces breast cancer risk by 90%.  Chemoprevention with Tamoxifen can reduce breast cancer risk in some women.  Surgical risk reduction options and Tamoxifen use should be discussed with Celeste's physician.      There are significant limitations of screening for ovarian cancer.  Thus, prophylactic removal of the ovaries and fallopian tubes is an option and is recommended after women are finished planning their families or between 35 and 40.  As with any surgery, risks are involved, and this option should be discussed in greater detail with a  gyn-oncologist.      We discussed that using oral contraceptives for five years or more has been shown to reduce ovarian cancer risk by around 50%.  The data are still inconclusive as to whether or not using oral contraceptives will increase breast cancer risk in BRCA mutation carriers.     The following screening is recommended for men who have a mutation in the BRCA1 gene:    Breast self-exam starting at age 35    Annual clinical breast exams starting at age 35    Consider prostate cancer screening starting at age 45.    Screening for pancreatic cancer is not offered on a routine basis, as the benefits of current screening methods are unknown.  Pancreatic cancer screening may be considered based on family history. Of note, Celeste does not have a family history of pancreatic cancer.  Screening for melanoma may also be considered.  Some chemotherapies for certain cancers may be more effective in individuals with BRCA mutations. she should discuss this with her physicians, if chemotherapy is indicated for her in the future.     We discussed that Celeste could participate in our Cancer Risk Management Program in which our clinical nurse specialist provides an individual screening plan and assists with medical management. A referral was made to see ENRIQUE Boland, for this service.    The above information is based on our current understanding of Celeste's genetic findings.  Celeste is encouraged to reach out to me as needed regarding any pertinent updates to her personal and/or family history of cancer, as our understanding of the genetic findings in her family may change over time.     Implications for Family Members:  We reviewed that mutations in BRCA1 are inherited in an autosomal dominant pattern. This means that each of Celeste's children have a 50% chance of inheriting the same mutation.  Likewise, each of her brothers also has a 50% risk of having the same mutation. I am happy to help her relatives  "connect with a genetic counselor in their area if they would like to discuss testing.    In rare situations in which both parents have a mutation in the BRCA1 gene, their children each have a 25% risk for Fanconi anemia-like syndrome. Fanconi anemia is a rare condition with onset in childhood.  Fanconi anemia often results in physical abnormalities, growth retardation, bone marrow failure, and increased risk for cancer.  For individuals of childbearing age with BRCA1 mutations, genetic counseling and genetic testing may be advised for their partners.     If Celeste is interested in having biological children in the future, there are different testing options both before (i.e. preimplantation genetic diagnosis) and during (i.e. chorionic villus sampling and genetic amniocentesis) a pregnancy to reduce the chance of having a child that carries this BRCA1 mutation. Other family planning options include pregnancy without intervention, adoption, and egg donation. These options can be discussed in greater detail if she has questions in the future.    Support Resources:  I provided Celeste with national and local support resources, which can be found in the after-visit summary.    Plan:  1.  I provided Celeste with a copy of her test results and support resources today.  2.  I will provide a \"Dear Relative\" letter for Celeste to share with her family members.  3.  She plans to follow up with her physicians as needed.  4. A referral will be placed to Miesha Flowers.     If  Celeste has additional questions, I encouraged her to contact  me directly at [phone].     Time spent face to face: 20 minutes.    Dara Claros MS, [facility]  Licensed Genetic Counselor  P. [phone]  F. [phone]        "

## 2018-06-15 NOTE — PROGRESS NOTES
"6/15/2018    Referring Provider: self-referred    Presenting Information:   Celeste returned to the Cancer Risk Management Program at the [facility] to discuss her genetic testing results. Her blood was drawn on 5/9/2018 and we ordered OvaNext testing from [facility]. This testing was done because of her family history of ovarian and breast cancer. She is here with her friend Olga narayan.    Genetic Testing Results: BRCA1 POSITIVE   Celeste is POSITIVE for a BRCA1 mutation. Specifically her mutation is called c.181T>G (p.C61G). 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  It is most likely that this is the same mutation that was found in her maternal relatives, as Celeste had reported that maternal relatives carry a BRCA1 mutation. We discussed the impact of this testing on Celeste in detail.     Of note, Celeste tested negative for mutations in the following genes by sequencing and deletion/duplication analysis: ALEM, BARD1, BRCA1, BRCA2, BRIP1, CDH1, CHEK2, DICER1, EPCAM, MLH1, MRE11A, MSH2, MSH6, MUTYH, NBN, NF1, PALB2, PMS2, PTEN, RAD50, RAD51C, RAD51D, SMARCA4, STK11, and TP53 genes. This test involved sequencing and deletion/duplication analysis of all genes with the exception of EPCAM (deletions/duplications only).  We reviewed the autosomal dominant inheritance of these genes.  Celeste cannot pass on a mutation in any of these genes to her children based on this test result.  Mutations in these genes do not skip generations.      A copy of the test report can be found in the Laboratory tab, dated 5/9/2018, and named \"SEND OUTS MISC TEST\". The report is scanned in as a linked document.    BRCA1 Cancer Risks:  Women with mutations in BRCA1 have a:    57-87% lifetime risk of developing breast cancer. This is much greater than the general population breast cancer risks of 12%    Up to 40% lifetime risk of developing ovarian " cancer. This is much greater than the general population ovarian cancer risks of 1-2%    Men with mutations in BRCA1 have a:      Increased lifetime prostate cancer risk, above the general population risk of approximately 16%.      Lifetime male breast cancer risk to age 70 of 1.2%, which is greater than the general population lifetime risk of less than 1%.     Lifetime pancreatic cancer risk and melanoma risk in both males and females may be increased as well.  Though no exact lifetime risks are available at this time, there may be an increased risk for other cancers.     Cancer Screening and Prevention:  The following screening is recommended for women who have a mutation in the BRCA1 gene.    Breast awareness starting at age 18    Clinical breast exams starting at age 25; repeated every 6-12 months    Annual breast MRI from age 25-29    Annual mammograms and breast MRI alternating every 6 months starting at age 30    Consider transvaginal ultrasound and a  blood test starting at age 30-35, though the benefits of this screening are unclear.    We discussed that prophylactic mastectomy is a surgical option, which reduces breast cancer risk by 90%.  Chemoprevention with Tamoxifen can reduce breast cancer risk in some women.  Surgical risk reduction options and Tamoxifen use should be discussed with [facility]'s physician.      There are significant limitations of screening for ovarian cancer.  Thus, prophylactic removal of the ovaries and fallopian tubes is an option and is recommended after women are finished planning their families or between 35 and 40.  As with any surgery, risks are involved, and this option should be discussed in greater detail with a gyn-oncologist.      We discussed that using oral contraceptives for five years or more has been shown to reduce ovarian cancer risk by around 50%.  The data are still inconclusive as to whether or not using oral contraceptives will increase breast cancer risk  in BRCA mutation carriers.     The following screening is recommended for men who have a mutation in the BRCA1 gene:    Breast self-exam starting at age 35    Annual clinical breast exams starting at age 35    Consider prostate cancer screening starting at age 45.    Screening for pancreatic cancer is not offered on a routine basis, as the benefits of current screening methods are unknown.  Pancreatic cancer screening may be considered based on family history. Of note, Celeste does not have a family history of pancreatic cancer.  Screening for melanoma may also be considered.  Some chemotherapies for certain cancers may be more effective in individuals with BRCA mutations. she should discuss this with her physicians, if chemotherapy is indicated for her in the future.     We discussed that Celeste could participate in our Cancer Risk Management Program in which our clinical nurse specialist provides an individual screening plan and assists with medical management. A referral was made to see ENRIQUE Boland, for this service.    The above information is based on our current understanding of Celeste's genetic findings.  Celeste is encouraged to reach out to me as needed regarding any pertinent updates to her personal and/or family history of cancer, as our understanding of the genetic findings in her family may change over time.     Implications for Family Members:  We reviewed that mutations in BRCA1 are inherited in an autosomal dominant pattern. This means that each of Celeste's children have a 50% chance of inheriting the same mutation.  Likewise, each of her brothers also has a 50% risk of having the same mutation. I am happy to help her relatives connect with a genetic counselor in their area if they would like to discuss testing.    In rare situations in which both parents have a mutation in the BRCA1 gene, their children each have a 25% risk for Fanconi anemia-like syndrome. Fanconi anemia is a rare  "condition with onset in childhood.  Fanconi anemia often results in physical abnormalities, growth retardation, bone marrow failure, and increased risk for cancer.  For individuals of childbearing age with BRCA1 mutations, genetic counseling and genetic testing may be advised for their partners.     If Celeste is interested in having biological children in the future, there are different testing options both before (i.e. preimplantation genetic diagnosis) and during (i.e. chorionic villus sampling and genetic amniocentesis) a pregnancy to reduce the chance of having a child that carries this BRCA1 mutation. Other family planning options include pregnancy without intervention, adoption, and egg donation. These options can be discussed in greater detail if she has questions in the future.    Support Resources:  I provided Celeste with national and local support resources, which can be found in the after-visit summary.    Plan:  1.  I provided Celeste with a copy of her test results and support resources today.  2.  I will provide a \"Dear Relative\" letter for Celeste to share with her family members.  3.  She plans to follow up with her physicians as needed.  4. A referral will be placed to Miesha Flowers.     If  Celeste has additional questions, I encouraged her to contact  me directly at [phone].     Time spent face to face: 20 minutes.    Dara Claros MS, [facility]  Licensed Genetic Counselor  P. [phone]  F. [phone]    "

## 2018-06-18 LAB — LAB SCANNED RESULT: ABNORMAL

## 2018-06-29 ENCOUNTER — ONCOLOGY VISIT (OUTPATIENT)
Dept: ONCOLOGY | Facility: CLINIC | Age: 33
End: 2018-06-29
Attending: CLINICAL NURSE SPECIALIST
Payer: COMMERCIAL

## 2018-06-29 VITALS
SYSTOLIC BLOOD PRESSURE: 123 MMHG | BODY MASS INDEX: 21.71 KG/M2 | HEART RATE: 74 BPM | WEIGHT: 118 LBS | DIASTOLIC BLOOD PRESSURE: 84 MMHG | HEIGHT: 62 IN | TEMPERATURE: 97.8 F | RESPIRATION RATE: 16 BRPM | OXYGEN SATURATION: 99 %

## 2018-06-29 DIAGNOSIS — Z12.39 ENCOUNTER FOR BREAST CANCER SCREENING OTHER THAN MAMMOGRAM: ICD-10-CM

## 2018-06-29 DIAGNOSIS — Z15.09 BRCA1 POSITIVE: Primary | ICD-10-CM

## 2018-06-29 DIAGNOSIS — Z80.41 FAMILY HISTORY OF MALIGNANT NEOPLASM OF OVARY: ICD-10-CM

## 2018-06-29 DIAGNOSIS — Z15.01 BRCA1 POSITIVE: Primary | ICD-10-CM

## 2018-06-29 DIAGNOSIS — Z91.89 AT RISK FOR CANCER: ICD-10-CM

## 2018-06-29 PROCEDURE — 99215 OFFICE O/P EST HI 40 MIN: CPT | Mod: ZP | Performed by: CLINICAL NURSE SPECIALIST

## 2018-06-29 PROCEDURE — G0463 HOSPITAL OUTPT CLINIC VISIT: HCPCS | Mod: ZF

## 2018-06-29 ASSESSMENT — PAIN SCALES - GENERAL: PAINLEVEL: NO PAIN (0)

## 2018-06-29 NOTE — MR AVS SNAPSHOT
After Visit Summary   6/29/2018    Celeste Arguello    MRN: [id number]           Patient Information     Date Of Birth          1985        Visit Information        Provider Department      6/29/2018 1:30 PM Miesha Flowers APRN [facility]        Today's Diagnoses     BRCA1 positive    -  1    Encounter for breast cancer screening other than mammogram        At risk for cancer        Family history of malignant neoplasm of ovary          Care Instructions    Individualized Surveillance Plan for women  Hereditary Breast and/or Ovarian Cancer Syndrome   Per NCCN Guidelines Version 1.2018   Recommended screening Test or procedure Last done Next Scheduled    Breast self awareness- starting at age 18. Women should be familiar with their breasts and promptly report changes to their care provider. Periodic, consistent self exam may facilitate breast self awareness.    6/29/2018 July 2019   Breast screening, starting at age 25 Clinical breast exams every 6 -12 months 6/29/2018 July 2019   Breast screening   Age 25-29 Annual breast MRI screening with contrast (preferred) or mammogram if MRI is unavailable or individualized based on family history if a breast cancer diagnosis before age 30 is present.       Breast MRI is performed preferably on day 7-15 of menstrual cycle for premenopausal women.     NA     NA   Breast screening   Age >30-75 years     Annual mammogram and   annual breast MRI, preferably on day 7-15 of menstrual cycle for premenopausal women.    Age>75 years, management should be considered on an individual basis. Baseline mammogram ordered NEXT: Breast MRI in January 2019    Next exam: July 2019 followed by tomosynthesis mammogram   Ovarian cancer screening, starting at age 30-35 Consider transvaginal ultrasound and  tests. Discuss benefits and limitations    Recommendation: risk-reducing salpingo-oophorectomy, typically between 35 and 40 years  old and  upon completion of child bearing.     Because ovarian cancer onset in patients with BRCA2 mutations is on average of 8-10 years later than in patients with BRCA1 mutations, it is reasonable to delay risk-reducing salpingo-oophorectomy until 40-45 years in patients with BRCA2 mutations who have already maximized their breast cancer prevention (i.e., undergone bilateral mastectomy).    Salpingectomy alone is not the standard of care for risk reduction although clinical trials are ongoing.     Discuss option of risk-reducing mastectomy.    Consider risks and benefits of risk reducing agents, such as tamoxifen and raloxifene for breast and ovarian cancer.    Consider a full body skin and eye exam for melanoma screening for both BRCA1+ and BRCA2+.     NOTE: Women with BRCA mutation who are treated for breast cancer should have screening of the remaining breast tissue with annual mammography and breast MRI.    The International Cancer of the Pancreas Screening (CAPS) Consortium recommends that individuals with a BRCA2 mutation who have at least one first-degree relative with pancreatic cancer should undergo initial screening with endoscopic ultrasonography and/or MRI/magnetic resonance cholangiopancreatography.         Transvaginal Ultrasound (Endovaginal Ultrasound)  A transvaginal ultrasound is an imaging test. An ultrasound uses sound waves to form pictures of your organs that can be seen on a screen. It is often done with a probe placed on the belly. A transvaginal ultrasound uses a special probe that is put into your vagina. It uses sound waves to make pictures of your uterus, ovaries, and other pelvic organs. This test can be used to check symptoms such as pain. It can also check for problems. In pregnant women, it is used to check the unborn baby or fetus. The test is often done by a specially trained technologist called a sonographer.  Getting ready for your test    You may be asked to go to the bathroom.  Your bladder may need to be empty before the test.    Tell the sonographer what medicines you take. Let him or her know if you have had pelvic surgery.    Answer any other questions the sonographer asks. Your answers will help him or her adapt the test to your health needs.  During your test    You may change into a hospital gown. You'll then lie down on an exam table with your knees raised, as you would for a pelvic exam.    The sonographer will use a thin hand-held probe. It is shaped like a tampon. The probe has a sterile cover and nongreasy gel. It is put inside your vagina. In some cases, you may be asked to put the probe in yourself, as you would a tampon.    The sonographer moves the probe to get the best picture. You may feel pressure. Tell the sonographer if you feel pain.  After your test  Before leaving, you may need to wait for a short time while the images are reviewed. You can go back to your normal routine right after the test. Your healthcare provider will let you know when the results of your test are ready.  Be aware that although the sonographer can answer questions about the test, only your healthcare provider can explain the results.   Date Last Reviewed: 11/1/2017 2000-2017 [employer]. [address] [place] [address], [place] [address]. All rights reserved. This information is not intended as a substitute for professional medical care. Always follow your healthcare professional's instructions.        Mammography    Mammography is an X-ray exam of your breast tissue. The image it makes is called a mammogram. A mammogram can help find problems with your breasts, such as cysts or cancer. Mammography is the best breast cancer screening tool available.  Have screening mammograms and professional breast exams as often as your healthcare provider recommends. Also, be sure you know how your breasts normally look and feel. This makes it easier to notice any changes. Report changes to your  healthcare provider as soon as possible.    How do I get ready for a mammogram?     Schedule the test for 1 week after your period. Your breasts are less sore then.    Make sure your clinic gets images of your last mammogram if it was done somewhere else. This lets the provider compare the 2 sets of images for any changes.    On the morning of your test, don t use deodorant, powder, or perfume.    Wear a top that you can take off easily.  What happens during a mammogram?     You will need to undress from the waist up.    The technologist will position your breast to get the best test results.    Each of your breasts will be compressed one at a time. This helps get the most complete X-ray image.    Your breasts will be repositioned to get at least 2 separate views of each breast.  What happens after a mammogram?    More X-rays are sometimes needed. If not done at the time of your initial mammogram, you ll be called to schedule them.    You should receive your test results in writing. Ask about this on the day of your appointment.    Have mammograms as often as your healthcare provider recommends.  Let the technologist know if:    You re pregnant or think you may be pregnant    You have breast implants    You have any scars or moles on or near your breasts    You ve had a breast biopsy or surgery    You re breastfeeding   Date Last Reviewed: 6/1/2017 2000-2017 [employer]. [address] [place] [address]. All rights reserved. This information is not intended as a substitute for professional medical care. Always follow your healthcare professional's instructions.      Understanding Breast Density  What is breast density?  Breasts are made of connective tissue, glandular tissue and fatty tissue. Dense breasts have a lot of the first two types of tissue, but not much fat.  Why is it important?  Having dense breasts means you have a slightly higher risk of getting breast cancer. It also means  your mammograms will be harder to read. This is because both dense tissue and lumps look white on a mammogram. Fatty tissue looks black. The pictures below show the 4 levels of breast density:     How do I know if I have dense breasts?  Only a mammogram can tell you if you have dense breasts. The person who reviews your mammogram (radiologist) will give your breasts a density score based on the levels shown above.  What should I do?  Talk to your doctor about your options. But know that mammograms are proven to reduce cancer deaths. Plus, a yearly mammogram is even more important for women who have a higher risk of breast cancer. Your doctor may order other tests as well.  You should also know how your breasts look and feel. Any time you feel or see something that isn't normal, tell your doctor.  For informational purposes only. Not to replace the advice of your health care provider. Images courtesy American College of Radiology (ACR)   and Society of Breast Imaging (SBI). Used with permission. Text copyright   2014 [employer] [facility]. All rights reserved. garbs [id number] - 08/14.    Magnetic Resonance Imaging (MRI) of the Breast  Magnetic resonance imaging (MRI) of the breast is an imaging test that uses strong magnets and radio waves to form pictures of the inside of the breast. It also creates images of the tissues that surround the breast. Breast MRI is used to check for problems, such as a leaking breast implant or a suspicious lump or mass. It can also be used to help determine if breast cancer is present and aid in diagnosis and management. Most MRI tests take 30 to 60 minutes. Depending on the type of MRI you are having, the test may take longer. Give yourself extra time to check in.      During a breast MRI, you lie face down on a platform that slides into a tubelike machine called a scanner.   Before your test    Follow any directions you are given for taking medicines and for not eating or  drinking before the test.    Follow your normal daily routine unless your provider tells you otherwise.    You ll be asked to remove your watch, hearing aids, credit cards, pens, eyeglasses, and other metal objects.    You may be asked to remove your makeup. Makeup may contain some metal.  MRI uses strong magnets. Metal is affected by magnets and can distort the image. The magnet used in MRI can cause metal objects in your body to move. If you have a metal implant, you may not be able to have an MRI unless the implant is certified as MRI safe. People with these implants should not have an MRI:    Ear (cochlear) implants    Certain clips used for brain aneurysms    Certain metal coils put in blood vessels    Most defibrillators    Most pacemakers  The radiologist or technologist may ask you if you:    Have had stereotactic breast biopsy    Have a pacemaker or implanted cardiac defibrillator    Have an artificial body part (prosthesis)    Have metal rods, screws, plates, or splinters in your body    Wear a medicated adhesive patch    Have tattoos or body piercings. Some tattoo inks contain metal.    Have implanted nerve stimulators or drug-infusion ports    Work with metal    Have braces    Have a bullet or other metal in your body  Tell the radiologist or technologist if you:    Are allergic to any medicines    Are pregnant or think you may be pregnant    Are afraid of small, enclosed spaces (claustrophobic)    Have any serious health problems (If you have kidney disease or a liver transplant  you may not be able to have the contrast material used for MRI)    Have had previous surgeries    Are breastfeeding   During your test    You may be asked to wear a hospital gown.    You may be given earplugs to wear if you desire.    You may be injected with contrast through an intravenous (IV) line in your hand or arm. This is a special dye that makes the MRI image sharp that may be needed depending on the reason for your  exam.    You ll lie on a platform that slides into a tube-like machine called a scanner. You ll be on your stomach with your breasts placed through openings in the platform.    Remain as still as you can while the camera takes the pictures. This will ensure the best images.  After your test    You can get back to normal activities right away.    If you were given contrast, it will pass naturally through your body within a day.    Drink lots of water so that the dye passes quickly out of your body.  Getting your results  Your healthcare provider will discuss the test results with you during a follow-up appointment or over the phone.  Date Last Reviewed: 2/1/2017 2000-2017 [employer]. [address] [place] [address], [place] [address]. All rights reserved. This information is not intended as a substitute for professional medical care. Always follow your healthcare professional's instructions.                Follow-ups after your visit        Additional Services     DERMATOLOGY REFERRAL       Your provider has referred you to: [facility]: Dermatology Clinic [facility] [phone]   [email or web address]    Please be aware that coverage of these services is subject to the terms and limitations of your health insurance plan.  Call member services at your health plan with any benefit or coverage questions.      Please bring the following to your appointment:  Any x-rays, CTs or MRIs which have been performed.  Contact the facility where they were done to arrange for  prior to your scheduled appointment.  Any new CT, MRI or other procedures ordered by your specialist must be performed at a [facility] facility or coordinated by your clinic's referral office.    List of current medications   This referral request   Any documents/labs given to you for this referral            GENERAL SURG ADULT REFERRAL       Your provider has referred you to: Gyn [facility]c for surgical consultation or  a prophylactic salpingo oophorectomy for BRCA1+ status.    Please be aware that coverage of these services is subject to the terms and limitations of your health insurance plan.  Call member services at your health plan with any benefit or coverage questions.      Please bring the following with you to your appointment:    (1) Any X-Rays, CTs or MRIs which have been performed.  Contact the facility where they were done to arrange for  prior to your scheduled appointment.   (2) List of current medications   (3) This referral request   (4) Any documents/labs given to you for this referral            GENERAL SURG ADULT REFERRAL       Your provider has referred you to: Dr. Sheridan Dudley for consultation    Please be aware that coverage of these services is subject to the terms and limitations of your health insurance plan.  Call member services at your health plan with any benefit or coverage questions.      Please bring the following with you to your appointment:    (1) Any X-Rays, CTs or MRIs which have been performed.  Contact the facility where they were done to arrange for  prior to your scheduled appointment.   (2) List of current medications   (3) This referral request   (4) Any documents/labs given to you for this referral            PLASTIC SURGERY REFERRAL       Your provider has referred you to: PREFERRED PROVIDERS: for discussion of prophylactic mastectomy    Please be aware that coverage of these services is subject to the terms and limitations of your health insurance plan.  Call member services at your health plan with any benefit or coverage questions.      Please bring the following to your appointment:  >>   Any x-rays, CTs or MRIs which have been performed.  Contact the facility where they were done to arrange for  prior to your scheduled appointment.  Any new CT, MRI or other procedures ordered by your specialist must be performed at a [facility] facility or coordinated by your clinic's  "referral office.    >>   List of current medications   >>   This referral request   >>   Any documents/labs given to you for this referral                  Follow-up notes from your care team     Return in about 1 year (around 6/29/2019) for Physical Exam, Lab Work.      Your next 10 appointments already scheduled     Jul 02, 2018  7:15 AM CDT   MA SCREENING BILATERAL W/ JR with [id number]   [facility] ([facility])    [address], [address]nd [address]  [facility] [address] [phone]           Three-dimensional (3D) mammograms are available at [facility] locations in [place], [place], [place], [place], [facility], [place], [place], and [place]. [facility] locations include [place] and [facility] in [place]. Benefits of 3D mammograms include: - Improved rate of cancer detection - Decreases your chance of having to go back for more tests, which means fewer: - \"False-positive\" results (This means that there is an abnormal area but it isn't cancer.) - Invasive testing procedures, such as a biopsy or surgery - Can provide clearer images of the breast if you have dense breast tissue. 3D mammography is an optional exam that anyone can have with a 2D mammogram. It doesn't replace or take the place of a 2D mammogram. 2D mammograms remain an effective screening test for all women.  Not all insurance companies cover the cost of a 3D mammogram. Check with your insurance.            Jul 02, 2018  7:45 AM CDT   US PELVIC COMPLETE W TRANSVAGINAL with UCUS1   [facility] ([facility])    [address] [place] [id number] [phone]           Please bring a list of your medicines (including vitamins, minerals and over-the-counter drugs). Also, tell your doctor about any allergies you may have. Wear comfortable clothes and leave your valuables at home.  Adults: Drink six 8-ounce glasses of " fluid one hour before your exam. Do NOT empty your bladder.  If you need to empty your bladder before your exam, try to release only a little bit of urine. Then, drink another 8oz glass of fluid.  Children: Children who are potty trained should drink at least 4 cups (32 oz) of liquid 45 minutes to one hour prior to the exam. The child s bladder must be full in order to achieve a diagnostic exam. If your child is very uncomfortable or has an urgent need to pee, please notify a technologist; they will try to find out how much longer the wait may be and provide instructions to help relieve the pressure. Occasionally it is medically necessary to insert a urinary catheter to fill the bladder.  Please call the Imaging Department at your exam site with any questions.            Jul 09, 2018  1:30 PM CDT   New Visit with Sheridan Dudley MD   [facility] ([facility])    [address] [place] [id number]   [phone]            Jul 19, 2018 12:30 PM CDT   New Visit with Marybeth King MD   [facility] ([facility])    [address] [place] [address]   [phone]            Jul 31, 2018  9:45 AM CDT   (Arrive by 9:30 AM)   New Patient Visit with FERN Soliman MD   [facility] ([facility])    [address] [place] [id number] [phone]            Aug 27, 2018  6:00 PM CDT   (Arrive by 5:45 PM)   New Patient Visit with Fracisco Spencer MD   [facility] ([facility])    [address] [place] [address]  3rd Floor  [facility] [id number] [phone]            Dec 19, 2018  1:00 PM CST   MR BREAST BILATERAL W/O & W CONTRAST with UC[address] [facility] [place] [facility] ([facility] [place])    [address] [place] [address]  [address] [facility] [id number] [phone]           Take your medicines as usual,  unless your doctor tells you not to. Bring a list of your current medicines to your exam (including vitamins, minerals and over-the-counter drugs).  The timing of your exam may depend on the start of your last period. If you re in menopause, you may have the exam anytime.  Please bring any previous mammograms or breast MRIs from other facilities to the MRI dept. Do not mail these items to us.   You will have IV contrast for this exam.  You do not need to do anything special to prepare.  The MRI machine uses a strong magnet. Please wear clothes without metal (snaps, zippers). A sweatsuit works well, or we may give you a hospital gown.  Please remove any body piercings and hair extensions before you arrive. You will also remove watches, jewelry, hairpins, wallets, dentures, partial dental plates and hearing aids. You may wear contact lenses, and you may be able to wear your rings. We have a safe place to keep your personal items, but it is safer to leave them at home.  **IMPORTANT** THE INSTRUCTIONS BELOW ARE ONLY FOR THOSE PATIENTS WHO HAVE BEEN PRESCRIBED SEDATION OR GENERAL ANESTHESIA DURING THEIR MRI PROCEDURE:  IF YOUR DOCTOR PRESCRIBED ORAL SEDATION (take medicine to help you relax during your exam):   You must get the medicine from your doctor (oral medication) before you arrive. Bring the medicine to the exam. Do not take it at home. You ll be told when to take it upon arriving for your exam.   Arrive one hour early. Bring someone who can take you home after the test. Your medicine will make you sleepy. After the exam, you may not drive, take a bus or take a taxi by yourself.  IF YOUR DOCTOR PRESCRIBED IV SEDATION:   Arrive one hour early. Bring someone who can take you home after the test. Your medicine will make you sleepy. After the exam, you may not drive, take a bus or take a taxi by yourself.   No eating 6 hours before your exam. You may have clear liquids up until 4 hours before your exam. (Clear  liquids include water, clear tea, black coffee and fruit juice without pulp.)  IF YOUR DOCTOR PRESCRIBED ANESTHESIA (be asleep for your exam):   Arrive 1 1/2 hours early. Bring someone who can take you home after the test. You may not drive, take a bus or take a taxi by yourself.   No eating 8 hours before your exam. You may have clear liquids up until 4 hours before your exam. (Clear liquids include water, clear tea, black coffee and fruit juice without pulp.)   You will spend four to five hours in the recovery room.  If you have any questions, please contact your Imaging Department exam site.            Dec 19, 2018  1:45 PM CST   US PELVIC COMPLETE W TRANSVAGINAL with US1   [facility] ([facility] [place])    [address] [address] [phone]           Please bring a list of your medicines (including vitamins, minerals and over-the-counter drugs). Also, tell your doctor about any allergies you may have. Wear comfortable clothes and leave your valuables at home.  Adults: Drink six 8-ounce glasses of fluid one hour before your exam. Do NOT empty your bladder.  If you need to empty your bladder before your exam, try to release only a little bit of urine. Then, drink another 8oz glass of fluid.  Children: Children who are potty trained should drink at least 4 cups (32 oz) of liquid 45 minutes to one hour prior to the exam. The child s bladder must be full in order to achieve a diagnostic exam. If your child is very uncomfortable or has an urgent need to pee, please notify a technologist; they will try to find out how much longer the wait may be and provide instructions to help relieve the pressure. Occasionally it is medically necessary to insert a urinary catheter to fill the bladder.  Please call the Imaging Department at your exam site with any questions.            Jun 21, 2019  1:00 PM CDT   [facility] Lab Draw with  [facility] LAB DRAW   M  [facility] Lab Draw ([facility])    [address]  [address]  [facility] [id number] [phone]            Jun 21, 2019  1:30 PM CDT   (Arrive by 1:15 PM)   Return Visit with ENRIQUE Cat   [facility] [place])    [address]  [address]  [facility] [id number] [phone]              Future tests that were ordered for you today     Open Standing Orders        Priority Remaining Interval Expires Ordered    US Pelvic Complete with Transvaginal Routine 3/3 6 6/29/2019 6/29/2018          Open Future Orders        Priority Expected Expires Ordered    US Pelvic Complete with Transvaginal Routine  6/29/2019 6/29/2018    US Pelvic Complete with Transvaginal Routine  6/29/2019 6/29/2018    MR Breast Bilateral w/o & w Contrast Routine 12/1/2018 6/30/2019 6/29/2018    MA Screen Bilateral w/John Routine  6/30/2019 6/29/2018            Who to contact     If you have questions or need follow up information about today's clinic visit or your schedule please contact [facility] directly at [phone].  Normal or non-critical lab and imaging results will be communicated to you by [employer], letter or phone within 4 business days after the clinic has received the results. If you do not hear from us within 7 days, please contact the clinic through [employer] or phone. If you have a critical or abnormal lab result, we will notify you by phone as soon as possible.  Submit refill requests through [employer] or call your pharmacy and they will forward the refill request to us. Please allow 3 business days for your refill to be completed.          Additional Information About Your Visit        [employer] Information     [employer] gives you secure access to your electronic health record. If you see a primary care provider, you can also send messages to your care team and make appointments. If you have  "questions, please call your primary care clinic.  If you do not have a primary care provider, please call [phone] and they will assist you.        Care EveryWhere ID     This is your Care EveryWhere ID. This could be used by other organizations to access your [facility] medical records  [id number]        Your Vitals Were     Pulse Temperature Respirations Height Pulse Oximetry BMI (Body Mass Index)    74 97.8  F (36.6  C) (Oral) 16 1.575 m (5' 2\") 99% 21.58 kg/m2       Blood Pressure from Last 3 Encounters:   06/29/18 123/84   04/26/18 125/78   03/16/17 113/71    Weight from Last 3 Encounters:   06/29/18 53.5 kg (118 lb)   04/26/18 54 kg (119 lb)   03/16/17 53.5 kg (118 lb)              We Performed the Following     DERMATOLOGY REFERRAL     GENERAL SURG ADULT REFERRAL     GENERAL SURG ADULT REFERRAL     PLASTIC SURGERY REFERRAL        Primary Care Provider Office Phone # Fax #    [facility] [phone]       [address] [place] [id number]        Equal Access to Services     KESHAV ABURTO : Hadii liam ku hadasho Sojassonali, waaxda luqadaha, qaybta kaalmada adedianayada, edgardo ramey . So [facility] [phone].    ATENCIÓN: Si habla español, tiene a de jesus disposición servicios gratuitos de asistencia lingüística. [facility] [phone].    We comply with applicable federal civil rights laws and [place] laws. We do not discriminate on the basis of race, color, national origin, age, disability, sex, sexual orientation, or gender identity.            Thank you!     Thank you for choosing [facility]  for your care. Our goal is always to provide you with excellent care. Hearing back from our patients is one way we can continue to improve our services. Please take a few minutes to complete the written survey that you may receive in the mail after your visit with us. Thank you!             Your Updated Medication List - Protect others around you: " Learn how to safely use, store and throw away your medicines at www.disposemymeds.org.          This list is accurate as of 6/29/18  4:04 PM.  Always use your most recent med list.                   Brand Name Dispense Instructions for use Diagnosis    citalopram 20 MG tablet    celeXA    90 tablet    Take 1 tablet (20 mg) by mouth daily    Adjustment disorder with anxious mood       Fluocinolone Acetonide Scalp 0.01 % Oil oil      APPLY TO SCALP TWICE A DAY FOR UP TO 3 WEEKS. WAIT 1 WEEK BETWEEN TREATMENTS.        ketoconazole 2 % shampoo    NIZORAL     LATHER IN SCALP FOR 5 MINUTES PRIOR TO RINSING. USE EVERY OTHER DAY FOR 1 WEEK.        meclizine 25 MG tablet    ANTIVERT     Take 25 mg by mouth 3 times daily as needed        * MIRENA (52 MG) 20 MCG/24HR IUD   Generic drug:  levonorgestrel      1 Device by Intrauterine route Was inserted [facility]  2-        * levonorgestrel 20 MCG/24HR IUD    MIRENA     1 Device by Intrauterine route        nicotine polacrilex 2 MG gum    NICORETTE    150 tablet    Place 1 each (2 mg) inside cheek as needed for smoking cessation    Encounter for smoking cessation counseling       norgestimate-ethinyl estradiol 0.25-35 MG-MCG per tablet    ORTHO-CYCLEN, SPRINTEC    84 tablet    Take active pill daily.    Family history of ovarian cancer, Endometriosis, Oral contraceptive use       ondansetron 4 MG ODT tab    ZOFRAN-ODT     Take 4 mg by mouth every 12 hours        * Notice:  This list has 2 medication(s) that are the same as other medications prescribed for you. Read the directions carefully, and ask your doctor or other care provider to review them with you.

## 2018-06-29 NOTE — PROGRESS NOTES
Oncology Risk Management Consultation:  Date on this visit: 2018    Celeste Arguello  is referred by Dara Claros, Licensed Genetic Counselor,  for an oncology risk management consultation. She requires evaluation for her risk of cancer secondary to having a deleterious BRCA1 mutation and is considered to be at high risk for hereditary breast and ovarian cancer.     Primary Physician: [facility]    History Of Present Illness:  Ms. Arguello is a very pleasant, healthy 33 year old female who presents with family history of breast and ovarian cancer and a personal diagnosis of a BRCA1 mutation.     Genetic Testin2018 - POSITIVE for a BRCA1 mutation. Specifically her mutation is called c.181T>G (p.C61G) identified using OvaNext testing from [employer]. This testing was done because of her family history of ovarian and breast cancer.  Of note, Celeste tested negative for mutations in the following genes by sequencing and deletion/duplication analysis: ALEM, BARD1, BRCA1, BRCA2, BRIP1, CDH1, CHEK2, DICER1, EPCAM, MLH1, MRE11A, MSH2, MSH6, MUTYH, NBN, NF1, PALB2, PMS2, PTEN, RAD50, RAD51C, RAD51D, SMARCA4, STK11, and TP53 genes.    Pertinent history:  Menarche at 11  First child at 24  Premenopausal.  LMP: unknown  Years of OCP use: approximately 15 years   Total years of HRT use: none  Uterus/Ovaries/Fallopian tubes: intact    Pertinent screening history:  2009 - Right breast US for pain, BiRads2.    2012 - Transvaginal US for pelvic pain, R ovarian complex cyst, likely dermoid  2015 - Transvaginal US for severe R adnexal and pelvic pain, 2.3 cm complex cyst of L ovary    She reports feel well. She denies breast pain, nipple discharge, nipple inversion, lumps, thickenings, masses, and changes in symmetry and skin of her breasts. She denies urinary urgency and frequency, constipation, abdominal pain, bloating.     Past Medical/Surgical History:  Past Medical History:    Diagnosis Date     BRCA1 gene mutation positive 6/15/2018    BRCA1 c.181T>G (p.C61G) tested at [facility] 5/9/2018     Complex Ovarian cyst-- incidental US finding R only 5/23/2012 1/14/13-> resolved     Mirena IUD -- strings not visible --> Plan RETRIEVE / replace by 3/10/15 5/23/2012    2/9/15: US documents placement in uterus. 7/12/2012: Documented placement in uterus / US.  3/10/2009: Mirena inserted.       Normal puberty age 10    cycles q 30 x 7, heavy w cramps     Right 3 cm soft, painful to palpation, ovarian cyst 6/16/2014    recurrent physiologic ovarian cyst -> US if pain persists.      Vertigo 2017     Past Surgical History:   Procedure Laterality Date     LAPAROSCOPY DIAGNOSTIC (GYN)  2006    endometriosis     urethral reimplant  age 6       Allergies:  Allergies as of 06/29/2018 - Benny as Reviewed 06/29/2018   Allergen Reaction Noted     Nkda [no known drug allergies]  10/15/2012       Current Medications:  Current Outpatient Prescriptions   Medication Sig Dispense Refill     levonorgestrel (MIRENA) 20 MCG/24HR IUD 1 Device by Intrauterine route       nicotine polacrilex (NICORETTE) 2 MG gum Place 1 each (2 mg) inside cheek as needed for smoking cessation 150 tablet 3     norgestimate-ethinyl estradiol (ORTHO-CYCLEN, SPRINTEC) 0.25-35 MG-MCG per tablet Take active pill daily. 84 tablet 3     citalopram (CELEXA) 20 MG tablet Take 1 tablet (20 mg) by mouth daily (Patient not taking: Reported on 6/29/2018) 90 tablet 4     Fluocinolone Acetonide Scalp 0.01 % OIL oil APPLY TO SCALP TWICE A DAY FOR UP TO 3 WEEKS. WAIT 1 WEEK BETWEEN TREATMENTS.  4     ketoconazole (NIZORAL) 2 % shampoo LATHER IN SCALP FOR 5 MINUTES PRIOR TO RINSING. USE EVERY OTHER DAY FOR 1 WEEK.  0     levonorgestrel (MIRENA, 52 MG,) 20 MCG/24HR IUD 1 Device by Intrauterine route Was inserted [facility]  2-       meclizine (ANTIVERT) 25 MG tablet Take 25 mg by mouth 3 times daily as needed       ondansetron (ZOFRAN-ODT) 4 MG ODT tab Take 4  mg by mouth every 12 hours          Family History:  Family History   Problem Relation Age of Onset     Ovarian Cancer Mother 38      age 47     Hereditary Breast and Ovarian Cancer Syndrome Maternal Aunt 50     Breast cancer gene -- MEHRDAD/BSO and mastectomy     Pancreatic Cancer Maternal Uncle 38     Alcohol/Drug Brother      active     Alcohol/Drug Brother      recovering     Psychotic Disorder Brother      depression and ADHD     Breast Cancer Maternal Aunt 35     & MC age 36     Uterine Cancer Maternal Aunt      C.A.D. 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 One    How much calcium per day? Dairy products       How much caffeine per day? 1 cup coffee and 2 can of coke    How much vitamin D per day? None    Do you/your family wear seatbelts?  Yes    Do you/your family use safety helmets? yes    Do you/your family use sunscreen? Yes    Do you/your family keep firearms in the home? No    Do you/your family have a smoke  "detector(s)? Yes        Do you feel safe in your home? Yes    Has anyone ever touched you in an unwanted manner? No     Explain     SEE GLADYS [facility]     06/16/2014    Beronica Dumas [facility] 04/26/18               Review of Systems:  GENERAL: No change in weight, sleep or appetite.  Normal energy.  No fever or chills  EYES: Negative for vision changes or eye problems  ENT: No problems with ears, nose or throat.  No difficulty swallowing.  RESP: No coughing, wheezing or shortness of breath  CV: No chest pains or palpitations  GI: No nausea, vomiting,  heartburn, abdominal pain, diarrhea, constipation or change in bowel habits  : No urinary frequency or dysuria, bladder or kidney problems  MUSCULOSKELETAL: No significant muscle or joint pains  NEUROLOGIC: No headaches, numbness, tingling, weakness, problems with balance or coordination  PSYCHIATRIC: No problems with anxiety, depression or mental health  HEME/IMMUNE/ALLERGY: No history of bleeding or clotting problems or anemia.  No allergies or immune system problems  ENDOCRINE: No history of thyroid disease, diabetes or other endocrine disorders  SKIN: No rashes,worrisome lesions or skin problems    Physical Exam:  /84  Pulse 74  Temp 97.8  F (36.6  C) (Oral)  Resp 16  Ht 1.575 m (5' 2\")  Wt 53.5 kg (118 lb)  SpO2 99%  BMI 21.58 kg/m2    GENERAL APPEARANCE: healthy, alert and no distress     NECK: no adenopathy, no asymmetry or masses     RESP: lungs clear to auscultation - no rales, rhonchi or wheezes    BREAST: A multi positional, bilateral breast exam was performed.  Fairly symmetrical breasts. Right breast: no palpable dominant masses, no nipple discharge, no skin changes. Dense tissue.  Right axilla: no palpable adenopathy. Left breast: no palpable dominant masses, no nipple discharge, no skin changes. Left axilla: no palpable adenopathy. Dense tissue.  LYMPHATICS: No cervical, supraclavicular or  axillary lymphadenopathy     CARDIOVASCULAR: regular " rate and rhythm, normal S1 S2, no S3 or S4 and no murmur.        SKIN: no suspicious lesions or rashes on anterior torso, upper extremities or face.    Laboratory/Imaging Studies  Results for orders placed or performed in visit on 05/09/18   OvaNext - [facility] Test: Laboratory Miscellaneous Order   Result Value Ref Range    Miscellaneous Test         Specimen Received, Reordered and sent to Performing laboratory - Report to follow upon   completion.     Send outs misc test   Result Value Ref Range    Lab Scanned Result SEND OUTS MISC TEST-Scanned (A)        ASSESSMENT  We discussed the differences between cancer risk for the general population and those with BRCA mutations. Women with BRCA mutations have a 40-80% lifetime risk of breast cancer, compared to the general population risk of 12%. Those with a BRCA mutation also bear a 10-40% lifetime risk of ovarian cancer, compared to the 1-2% lifetime risk within the general population.      We also discussed that inheriting a mutation does not mean that a person will develop cancer, but rather that they are at increased risk.       We also discussed the risks and benefits of risk reduction mastectomy, which could decrease her risk of breast cancer by 90%.I have made a referral to speak with Dr. Dudley and Dr. Soliman regarding this.      We also discussed the risks and benefits of prophylactic bilateral salpingo-ooperectomy after child bearing. I have made a referral for her to have a consultation with the Gyn Onc providers; she may also discuss this with her Ob-Gyn, Dr. Crisostomo.     She is currently using both a Mirena IUD (inserted in February 2015) and is taking combination birth control pills, which she is not taking regularly.   We discussed that the  use of oral contraceptives is associated with a significant reduced risk of ovarian cancer for BRCA1/2 carriers with a 36% risk reduction in ovarian cancers for each additional 10 years of oral contraceptive use (SRR:  0.64; 95% CI, 0.53-0.78; P trend < 0.01). (Naren et al.2010.Oral contraceptive use and breast or ovarian cancer risk in BRCA1/2 carriers: A meta-analysis.  Journal of Cancer. 46:12: 3032-8035.) If she is not taking the combination birth control pills daily, they may not be stopping ovulation and may not be effective in reducing her ovarian cancer risk.     We also discussed following  a healthy lifestyle plan recommended by both NCCN and the American Cancer Society that can reduce the risk of  cancer:  1 Limit alcohol consumption to less than 1 drink per day (1 drink=5 oz.wine, 12 oz. Beer or 1.5 oz. 80-proof liquor). She is well within this guideline.  2. Exercise per American Cancer Society guidelines of at least 150 minutes of moderate-intensity activity or 75 minutes of vigorous activity each week. (Or a combination of both.) Exercise should be spread  out over the week.  3. Maintain a healthy weight with a Body Mass Index between 19-24.9. Her BMI is 21.5.   4. Do not use tobacco products and limit exposure to passive smoke. She is currently smoking.     She is interested in both a prophylactic mastectomy and a risk reducing salpingo oophorectomy (RRSO) . We also discussed that if she elects to have a risk reducing salpingo oophorectomy, she may be able to have hormone replacement therapy for a short period of time, although there is limited data on this topic.   Renny et al. 2011 published a prospective study of 1,299 women who had RRSO  found that both BRCA1 and BRCA2 mutation carriers who used hormone replacement therapy(HRT)  following RRSO, had no increased risk of breast cancer compared to the non HRT group. No increased risk of breast cancer was seen with either combination HRT or estrogen-only HRT.    Haris et al 2018 reviewed four studies in a meta analysis  relevant tot the use of HRT in BRCA1/2 carriers and found that HRT was not associated with a significant risk increase for breast  cancer among BRCA carriers who had undergone bilateral salpingo oophorectomy in women who had taken estrogen or progesterone and estrogen.   These studies are limited in size and still need to be replicated with larger populations.      One approach is to stop HT at 51 years old, the average age of natural menopause. Another consideration would be a concurrent hysterectomy in order to enable the use of unopposed estrogen because estrogen therapy alone appears to incur a lower risk of breast cancer than estrogen-progestin therapy. However, unopposed estrogen therapy in a woman with a uterus would increase her risk of endometrial cancer. A potential alternative is an ultra-low-dose transdermal estrogen formulation  with which progestin opposition is required only periodically (eg, every six to 12 months).   At this point, she will proceed with the following screening plan.     INDIVIDUALIZED CANCER RISK MANAGEMENT PLAN:  Individualized Surveillance Plan for women  Hereditary Breast and/or Ovarian Cancer Syndrome   Per NCCN Guidelines Version 1.2018   Recommended screening Test or procedure Last done Next Scheduled    Breast self awareness- starting at age 18. Women should be familiar with their breasts and promptly report changes to their care provider. Periodic, consistent self exam may facilitate breast self awareness.    6/29/2018 July 2019   Breast screening, starting at age 25 Clinical breast exams every 6 -12 months 6/29/2018 July 2019   Breast screening   Age 25-29 Annual breast MRI screening with contrast (preferred) or mammogram if MRI is unavailable or individualized based on family history if a breast cancer diagnosis before age 30 is present.       Breast MRI is performed preferably on day 7-15 of menstrual cycle for premenopausal women.     NA     NA   Breast screening   Age >30-75 years     Annual mammogram and   annual breast MRI, preferably on day 7-15 of menstrual cycle for premenopausal  women.    Age>75 years, management should be considered on an individual basis. Baseline mammogram ordered NEXT: Breast MRI in January 2019    Next exam: July 2019 followed by tomosynthesis mammogram   Ovarian cancer screening, starting at age 30-35 Consider transvaginal ultrasound and  tests. Discuss benefits and limitations    Recommendation: risk-reducing salpingo-oophorectomy, typically between 35 and 40 years old and  upon completion of child bearing.     Because ovarian cancer onset in patients with BRCA2 mutations is on average of 8-10 years later than in patients with BRCA1 mutations, it is reasonable to delay risk-reducing salpingo-oophorectomy until 40-45 years in patients with BRCA2 mutations who have already maximized their breast cancer prevention (i.e., undergone bilateral mastectomy).    Salpingectomy alone is not the standard of care for risk reduction although clinical trials are ongoing.     Discuss option of risk-reducing mastectomy.    Consider risks and benefits of risk reducing agents, such as tamoxifen and raloxifene for breast and ovarian cancer.    Consider a full body skin and eye exam for melanoma screening for both BRCA1+ and BRCA2+.     NOTE: Women with BRCA mutation who are treated for breast cancer should have screening of the remaining breast tissue with annual mammography and breast MRI.    The International Cancer of the Pancreas Screening (CAPS) Consortium recommends that individuals with a BRCA2 mutation who have at least one first-degree relative with pancreatic cancer should undergo initial screening with endoscopic ultrasonography and/or MRI/magnetic resonance cholangiopancreatography.       I will be happy to work with her to manage her cancer risk, will follow up on her screenings and see her in the Cancer Risk Management clinic in one year.    I spent 48 minutes with the patient with greater that 50% of it in counseling and coordinating care as documented  above.    ENRIQUE Cat-CNS, OCN, ANG-BC  Clinical Nurse Specialist  Cancer Risk Management Program  [address]  [place] [address]    phone:  [phone]  Pager: [phone]  fax: [phone]    Cc:  ENRIQUE Flores CNM

## 2018-06-29 NOTE — NURSING NOTE
"Oncology Rooming Note    June 29, 2018 1:45 PM   Celeste Arguello is a 33 year old female who presents for:    Chief Complaint   Patient presents with     Oncology Clinic Visit     New patient; BRCA 1 gene     Initial Vitals: /84  Pulse 74  Temp 97.8  F (36.6  C) (Oral)  Resp 16  Ht 1.575 m (5' 2\")  Wt 53.5 kg (118 lb)  SpO2 99%  BMI 21.58 kg/m2 Estimated body mass index is 21.58 kg/(m^2) as calculated from the following:    Height as of this encounter: 1.575 m (5' 2\").    Weight as of this encounter: 53.5 kg (118 lb). Body surface area is 1.53 meters squared.  No Pain (0) Comment: Data Unavailable   No LMP recorded. Patient is not currently having periods (Reason: IUD).  Allergies reviewed: Yes  Medications reviewed: Yes    Medications: Medication refills not needed today.  Pharmacy name entered into [facility]:    [facility] [address] [place] [address] [id number] [address] [address] [place] [address]    Clinical concerns: New patient; BRCA 1 gene     6 minutes for nursing intake (face to face time)     Ale Tyler [facility]              "

## 2018-06-29 NOTE — LETTER
Cancer Risk Management  Program Locations    [facility]  [facility]  [facility]  Mailing Address  Cancer Risk Management Program  [facility]  [address] [place] [address]    New patient appointments  [phone]  2018    Celeste Arguello  [address]  [address]  [facility] [address]      Dear Celeste,    It was a pleasure meeting with you today.  Below is a copy of my note from our visit, outlining your surveillance plan.      I look forward to seeing you in the future to coordinate your care and reduce your health risks. Please feel free to contact me if you have any questions or concerns.      Oncology Risk Management Consultation:  Date on this visit: 2018    Celeste Arguello  is referred by Dara Claros, Licensed Genetic Counselor,  for an oncology risk management consultation. She requires evaluation for her risk of cancer secondary to having a deleterious BRCA1 mutation and is considered to be at high risk for hereditary breast and ovarian cancer.     Primary Physician: Shanthi Aguirre [facility]    History Of Present Illness:  Ms. Arguello is a very pleasant, healthy 33 year old female who presents with family history of breast and ovarian cancer and a personal diagnosis of a BRCA1 mutation.     Genetic Testin2018 - POSITIVE for a BRCA1 mutation. Specifically her mutation is called c.181T>G (p.C61G) identified using OvaNext testing from [facility]. This testing was done because of her family history of ovarian and breast cancer.  Of note, Celeste tested negative for mutations in the following genes by sequencing and deletion/duplication analysis: ALEM, BARD1, BRCA1, BRCA2, BRIP1, CDH1, CHEK2, DICER1, EPCAM, MLH1, MRE11A, MSH2, MSH6, MUTYH, NBN, NF1, PALB2, PMS2, PTEN, RAD50, RAD51C,  RAD51D, SMARCA4, STK11, and TP53 genes.    Pertinent history:  Menarche at 11  First child at 24  Premenopausal.  LMP: unknown  Years of OCP use: approximately 15 years   Total years of HRT use: none  Uterus/Ovaries/Fallopian tubes: intact    Pertinent screening history:  8/26/2009 - Right breast US for pain, BiRads2.    5/23/2012 - Transvaginal US for pelvic pain, R ovarian complex cyst, likely dermoid  2/9/2015 - Transvaginal US for severe R adnexal and pelvic pain, 2.3 cm complex cyst of L ovary    She reports feel well. She denies breast pain, nipple discharge, nipple inversion, lumps, thickenings, masses, and changes in symmetry and skin of her breasts. She denies urinary urgency and frequency, constipation, abdominal pain, bloating.     Past Medical/Surgical History:  Past Medical History:   Diagnosis Date     BRCA1 gene mutation positive 6/15/2018    BRCA1 c.181T>G (p.C61G) tested at [facility] 5/9/2018     Complex Ovarian cyst-- incidental US finding R only 5/23/2012 1/14/13-> resolved     Mirena IUD -- strings not visible --> Plan RETRIEVE / replace by 3/10/15 5/23/2012    2/9/15: US documents placement in uterus. 7/12/2012: Documented placement in uterus / US.  3/10/2009: Mirena inserted.       Normal puberty age 10    cycles q 30 x 7, heavy w cramps     Right 3 cm soft, painful to palpation, ovarian cyst 6/16/2014    recurrent physiologic ovarian cyst -> US if pain persists.      Vertigo 2017     Past Surgical History:   Procedure Laterality Date     LAPAROSCOPY DIAGNOSTIC (GYN)  2006    endometriosis     urethral reimplant  age 6       Allergies:  Allergies as of 06/29/2018 - Benny as Reviewed 06/29/2018   Allergen Reaction Noted     Nkda [no known drug allergies]  10/15/2012       Current Medications:  Current Outpatient Prescriptions   Medication Sig Dispense Refill     levonorgestrel (MIRENA) 20 MCG/24HR IUD 1 Device by Intrauterine route       nicotine polacrilex (NICORETTE) 2 MG gum Place 1 each (2 mg)  inside cheek as needed for smoking cessation 150 tablet 3     norgestimate-ethinyl estradiol (ORTHO-CYCLEN, SPRINTEC) 0.25-35 MG-MCG per tablet Take active pill daily. 84 tablet 3     citalopram (CELEXA) 20 MG tablet Take 1 tablet (20 mg) by mouth daily (Patient not taking: Reported on 2018) 90 tablet 4     Fluocinolone Acetonide Scalp 0.01 % OIL oil APPLY TO SCALP TWICE A DAY FOR UP TO 3 WEEKS. WAIT 1 WEEK BETWEEN TREATMENTS.  4     ketoconazole (NIZORAL) 2 % shampoo LATHER IN SCALP FOR 5 MINUTES PRIOR TO RINSING. USE EVERY OTHER DAY FOR 1 WEEK.  0     levonorgestrel (MIRENA, 52 MG,) 20 MCG/24HR IUD 1 Device by Intrauterine route Was inserted [facility]  2015       meclizine (ANTIVERT) 25 MG tablet Take 25 mg by mouth 3 times daily as needed       ondansetron (ZOFRAN-ODT) 4 MG ODT tab Take 4 mg by mouth every 12 hours          Family History:  Family History   Problem Relation Age of Onset     Ovarian Cancer Mother 38      age 47     Hereditary Breast and Ovarian Cancer Syndrome Maternal Aunt 50     Breast cancer gene -- MEHRDAD/BSO and mastectomy     Pancreatic Cancer Maternal Uncle 38     Alcohol/Drug Brother      active     Alcohol/Drug Brother      recovering     Psychotic Disorder Brother      depression and ADHD     Breast Cancer Maternal Aunt 35     & MC age 36     Uterine Cancer Maternal Aunt      C.A.D. 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 One    How much calcium per day? Dairy products       How much caffeine per day? 1 cup coffee and 2 can of coke    How much vitamin D per day? None    Do you/your family wear seatbelts?  Yes    Do you/your family use safety helmets? yes    Do you/your family use sunscreen? Yes    Do you/your family keep firearms in the home? No    Do you/your family have a smoke detector(s)? Yes        Do you feel safe in your home? Yes    Has anyone ever touched you in an unwanted manner? No     Explain     SEE GLADYS [facility]     06/16/2014    Beronica Dumas [facility] 04/26/18               Review of Systems:  GENERAL: No change in weight, sleep or appetite.  Normal energy.  No fever or chills  EYES: Negative for vision changes or eye problems  ENT: No problems with ears, nose or throat.  No difficulty swallowing.  RESP: No coughing, wheezing or shortness of breath  CV: No chest pains or palpitations  GI: No nausea, vomiting,  heartburn, abdominal pain, diarrhea, constipation or change in bowel habits  : No urinary frequency or dysuria, bladder or kidney problems  MUSCULOSKELETAL: No significant muscle or joint pains  NEUROLOGIC: No headaches, numbness, tingling, weakness, problems with balance or coordination  PSYCHIATRIC: No problems with anxiety, depression or mental health  HEME/IMMUNE/ALLERGY: No history of bleeding or clotting problems or anemia.  No allergies or immune system problems  ENDOCRINE: No history of thyroid disease, diabetes or other endocrine disorders  SKIN: No rashes,worrisome lesions  "or skin problems    Physical Exam:  /84  Pulse 74  Temp 97.8  F (36.6  C) (Oral)  Resp 16  Ht 1.575 m (5' 2\")  Wt 53.5 kg (118 lb)  SpO2 99%  BMI 21.58 kg/m2    GENERAL APPEARANCE: healthy, alert and no distress     NECK: no adenopathy, no asymmetry or masses     RESP: lungs clear to auscultation - no rales, rhonchi or wheezes    BREAST: A multi positional, bilateral breast exam was performed.  Fairly symmetrical breasts. Right breast: no palpable dominant masses, no nipple discharge, no skin changes. Dense tissue.  Right axilla: no palpable adenopathy. Left breast: no palpable dominant masses, no nipple discharge, no skin changes. Left axilla: no palpable adenopathy. Dense tissue.  LYMPHATICS: No cervical, supraclavicular or  axillary lymphadenopathy     CARDIOVASCULAR: regular rate and rhythm, normal S1 S2, no S3 or S4 and no murmur.        SKIN: no suspicious lesions or rashes on anterior torso, upper extremities or face.    Laboratory/Imaging Studies  Results for orders placed or performed in visit on 05/09/18   [facility]Next - [facility] Test: Laboratory Miscellaneous Order   Result Value Ref Range    Miscellaneous Test         Specimen Received, Reordered and sent to Performing laboratory - Report to follow upon   completion.     Send outs misc test   Result Value Ref Range    Lab Scanned Result SEND OUTS MISC TEST-Scanned (A)        ASSESSMENT  We discussed the differences between cancer risk for the general population and those with BRCA mutations. Women with BRCA mutations have a 40-80% lifetime risk of breast cancer, compared to the general population risk of 12%. Those with a BRCA mutation also bear a 10-40% lifetime risk of ovarian cancer, compared to the 1-2% lifetime risk within the general population.      We also discussed that inheriting a mutation does not mean that a person will develop cancer, but rather that they are at increased risk.       We also discussed the risks and benefits of risk " reduction mastectomy, which could decrease her risk of breast cancer by 90%.I have made a referral to speak with Dr. Dudley and Dr. Soliman regarding this.      We also discussed the risks and benefits of prophylactic bilateral salpingo-ooperectomy after child bearing. I have made a referral for her to have a consultation with the Gyn Onc providers; she may also discuss this with her Ob-Gyn, Dr. Crisostomo.     She is currently using both a Mirena IUD (inserted in February 2015) and is taking combination birth control pills, which she is not taking regularly.   We discussed that the  use of oral contraceptives is associated with a significant reduced risk of ovarian cancer for BRCA1/2 carriers with a 36% risk reduction in ovarian cancers for each additional 10 years of oral contraceptive use (SRR: 0.64; 95% CI, 0.53-0.78; P trend < 0.01). (Naren et al.2010.Oral contraceptive use and breast or ovarian cancer risk in BRCA1/2 carriers: A meta-analysis.  Journal of Cancer. 46:12: 7116-1913.) If she is not taking the combination birth control pills daily, they may not be stopping ovulation and may not be effective in reducing her ovarian cancer risk.     We also discussed following  a healthy lifestyle plan recommended by both NCCN and the American Cancer Society that can reduce the risk of  cancer:  1 Limit alcohol consumption to less than 1 drink per day (1 drink=5 oz.wine, 12 oz. Beer or 1.5 oz. 80-proof liquor). She is well within this guideline.  2. Exercise per American Cancer Society guidelines of at least 150 minutes of moderate-intensity activity or 75 minutes of vigorous activity each week. (Or a combination of both.) Exercise should be spread  out over the week.  3. Maintain a healthy weight with a Body Mass Index between 19-24.9. Her BMI is 21.5.   4. Do not use tobacco products and limit exposure to passive smoke. She is currently smoking.     She is interested in both a prophylactic mastectomy and a risk  reducing salpingo oophorectomy (RRSO) . We also discussed that if she elects to have a risk reducing salpingo oophorectomy, she may be able to have hormone replacement therapy for a short period of time, although there is limited data on this topic.   Renny et al. 2011 published a prospective study of 1,299 women who had RRSO  found that both BRCA1 and BRCA2 mutation carriers who used hormone replacement therapy(HRT)  following RRSO, had no increased risk of breast cancer compared to the non HRT group. No increased risk of breast cancer was seen with either combination HRT or estrogen-only HRT.    Haris et al 2018 reviewed four studies in a meta analysis  relevant tot the use of HRT in BRCA1/2 carriers and found that HRT was not associated with a significant risk increase for breast cancer among BRCA carriers who had undergone bilateral salpingo oophorectomy in women who had taken estrogen or progesterone and estrogen.   These studies are limited in size and still need to be replicated with larger populations.      One approach is to stop HT at 51 years old, the average age of natural menopause. Another consideration would be a concurrent hysterectomy in order to enable the use of unopposed estrogen because estrogen therapy alone appears to incur a lower risk of breast cancer than estrogen-progestin therapy. However, unopposed estrogen therapy in a woman with a uterus would increase her risk of endometrial cancer. A potential alternative is an ultra-low-dose transdermal estrogen formulation  with which progestin opposition is required only periodically (eg, every six to 12 months).   At this point, she will proceed with the following screening plan.     INDIVIDUALIZED CANCER RISK MANAGEMENT PLAN:  Individualized Surveillance Plan for women  Hereditary Breast and/or Ovarian Cancer Syndrome   Per NCCN Guidelines Version 1.2018   Recommended screening Test or procedure Last done Next Scheduled    Breast self  awareness- starting at age 18. Women should be familiar with their breasts and promptly report changes to their care provider. Periodic, consistent self exam may facilitate breast self awareness.    6/29/2018 July 2019   Breast screening, starting at age 25 Clinical breast exams every 6 -12 months 6/29/2018 July 2019   Breast screening   Age 25-29 Annual breast MRI screening with contrast (preferred) or mammogram if MRI is unavailable or individualized based on family history if a breast cancer diagnosis before age 30 is present.       Breast MRI is performed preferably on day 7-15 of menstrual cycle for premenopausal women.     NA     NA   Breast screening   Age >30-75 years     Annual mammogram and   annual breast MRI, preferably on day 7-15 of menstrual cycle for premenopausal women.    Age>75 years, management should be considered on an individual basis. Baseline mammogram ordered NEXT: Breast MRI in January 2019    Next exam: July 2019 followed by tomosynthesis mammogram   Ovarian cancer screening, starting at age 30-35 Consider transvaginal ultrasound and  tests. Discuss benefits and limitations    Recommendation: risk-reducing salpingo-oophorectomy, typically between 35 and 40 years old and  upon completion of child bearing.     Because ovarian cancer onset in patients with BRCA2 mutations is on average of 8-10 years later than in patients with BRCA1 mutations, it is reasonable to delay risk-reducing salpingo-oophorectomy until 40-45 years in patients with BRCA2 mutations who have already maximized their breast cancer prevention (i.e., undergone bilateral mastectomy).    Salpingectomy alone is not the standard of care for risk reduction although clinical trials are ongoing.     Discuss option of risk-reducing mastectomy.    Consider risks and benefits of risk reducing agents, such as tamoxifen and raloxifene for breast and ovarian cancer.    Consider a full body skin and eye exam for melanoma screening  for both BRCA1+ and BRCA2+.     NOTE: Women with BRCA mutation who are treated for breast cancer should have screening of the remaining breast tissue with annual mammography and breast MRI.    The International Cancer of the Pancreas Screening (CAPS) Consortium recommends that individuals with a BRCA2 mutation who have at least one first-degree relative with pancreatic cancer should undergo initial screening with endoscopic ultrasonography and/or MRI/magnetic resonance cholangiopancreatography.       I will be happy to work with her to manage her cancer risk, will follow up on her screenings and see her in the Cancer Risk Management clinic in one year.    I spent 48 minutes with the patient with greater that 50% of it in counseling and coordinating care as documented above.    ENRIQUE Cat-CNS, OCN, ANG-BC  Clinical Nurse Specialist  Cancer Risk Management Program  [address]  [place] [address]    phone:  [phone]  Pager: [phone]  fax: [phone]    Cc:  ENRIQUE Flores CNM

## 2018-06-29 NOTE — PATIENT INSTRUCTIONS
Individualized Surveillance Plan for women  Hereditary Breast and/or Ovarian Cancer Syndrome   Per NCCN Guidelines Version 1.2018   Recommended screening Test or procedure Last done Next Scheduled    Breast self awareness- starting at age 18. Women should be familiar with their breasts and promptly report changes to their care provider. Periodic, consistent self exam may facilitate breast self awareness.    6/29/2018 July 2019   Breast screening, starting at age 25 Clinical breast exams every 6 -12 months 6/29/2018 July 2019   Breast screening   Age 25-29 Annual breast MRI screening with contrast (preferred) or mammogram if MRI is unavailable or individualized based on family history if a breast cancer diagnosis before age 30 is present.       Breast MRI is performed preferably on day 7-15 of menstrual cycle for premenopausal women.     NA     NA   Breast screening   Age >30-75 years     Annual mammogram and   annual breast MRI, preferably on day 7-15 of menstrual cycle for premenopausal women.    Age>75 years, management should be considered on an individual basis. Baseline mammogram ordered NEXT: Breast MRI in January 2019    Next exam: July 2019 followed by tomosynthesis mammogram   Ovarian cancer screening, starting at age 30-35 Consider transvaginal ultrasound and  tests. Discuss benefits and limitations    Recommendation: risk-reducing salpingo-oophorectomy, typically between 35 and 40 years old and  upon completion of child bearing.     Because ovarian cancer onset in patients with BRCA2 mutations is on average of 8-10 years later than in patients with BRCA1 mutations, it is reasonable to delay risk-reducing salpingo-oophorectomy until 40-45 years in patients with BRCA2 mutations who have already maximized their breast cancer prevention (i.e., undergone bilateral mastectomy).    Salpingectomy alone is not the standard of care for risk reduction although clinical trials are ongoing.     Discuss option  of risk-reducing mastectomy.    Consider risks and benefits of risk reducing agents, such as tamoxifen and raloxifene for breast and ovarian cancer.    Consider a full body skin and eye exam for melanoma screening for both BRCA1+ and BRCA2+.     NOTE: Women with BRCA mutation who are treated for breast cancer should have screening of the remaining breast tissue with annual mammography and breast MRI.    The International Cancer of the Pancreas Screening (CAPS) Consortium recommends that individuals with a BRCA2 mutation who have at least one first-degree relative with pancreatic cancer should undergo initial screening with endoscopic ultrasonography and/or MRI/magnetic resonance cholangiopancreatography.         Transvaginal Ultrasound (Endovaginal Ultrasound)  A transvaginal ultrasound is an imaging test. An ultrasound uses sound waves to form pictures of your organs that can be seen on a screen. It is often done with a probe placed on the belly. A transvaginal ultrasound uses a special probe that is put into your vagina. It uses sound waves to make pictures of your uterus, ovaries, and other pelvic organs. This test can be used to check symptoms such as pain. It can also check for problems. In pregnant women, it is used to check the unborn baby or fetus. The test is often done by a specially trained technologist called a sonographer.  Getting ready for your test    You may be asked to go to the bathroom. Your bladder may need to be empty before the test.    Tell the sonographer what medicines you take. Let him or her know if you have had pelvic surgery.    Answer any other questions the sonographer asks. Your answers will help him or her adapt the test to your health needs.  During your test    You may change into a hospital gown. You'll then lie down on an exam table with your knees raised, as you would for a pelvic exam.    The sonographer will use a thin hand-held probe. It is shaped like a tampon. The probe  has a sterile cover and nongreasy gel. It is put inside your vagina. In some cases, you may be asked to put the probe in yourself, as you would a tampon.    The sonographer moves the probe to get the best picture. You may feel pressure. Tell the sonographer if you feel pain.  After your test  Before leaving, you may need to wait for a short time while the images are reviewed. You can go back to your normal routine right after the test. Your healthcare provider will let you know when the results of your test are ready.  Be aware that although the sonographer can answer questions about the test, only your healthcare provider can explain the results.   Date Last Reviewed: 11/1/2017 2000-2017 [employer]. [address] [place] [address], [place] [address]. All rights reserved. This information is not intended as a substitute for professional medical care. Always follow your healthcare professional's instructions.        Mammography    Mammography is an X-ray exam of your breast tissue. The image it makes is called a mammogram. A mammogram can help find problems with your breasts, such as cysts or cancer. Mammography is the best breast cancer screening tool available.  Have screening mammograms and professional breast exams as often as your healthcare provider recommends. Also, be sure you know how your breasts normally look and feel. This makes it easier to notice any changes. Report changes to your healthcare provider as soon as possible.    How do I get ready for a mammogram?     Schedule the test for 1 week after your period. Your breasts are less sore then.    Make sure your clinic gets images of your last mammogram if it was done somewhere else. This lets the provider compare the 2 sets of images for any changes.    On the morning of your test, don t use deodorant, powder, or perfume.    Wear a top that you can take off easily.  What happens during a mammogram?     You will need to undress from the waist  up.    The technologist will position your breast to get the best test results.    Each of your breasts will be compressed one at a time. This helps get the most complete X-ray image.    Your breasts will be repositioned to get at least 2 separate views of each breast.  What happens after a mammogram?    More X-rays are sometimes needed. If not done at the time of your initial mammogram, you ll be called to schedule them.    You should receive your test results in writing. Ask about this on the day of your appointment.    Have mammograms as often as your healthcare provider recommends.  Let the technologist know if:    You re pregnant or think you may be pregnant    You have breast implants    You have any scars or moles on or near your breasts    You ve had a breast biopsy or surgery    You re breastfeeding   Date Last Reviewed: 6/1/2017 2000-2017 [employer]. [address] [place] [address]. All rights reserved. This information is not intended as a substitute for professional medical care. Always follow your healthcare professional's instructions.      Understanding Breast Density  What is breast density?  Breasts are made of connective tissue, glandular tissue and fatty tissue. Dense breasts have a lot of the first two types of tissue, but not much fat.  Why is it important?  Having dense breasts means you have a slightly higher risk of getting breast cancer. It also means your mammograms will be harder to read. This is because both dense tissue and lumps look white on a mammogram. Fatty tissue looks black. The pictures below show the 4 levels of breast density:     How do I know if I have dense breasts?  Only a mammogram can tell you if you have dense breasts. The person who reviews your mammogram (radiologist) will give your breasts a density score based on the levels shown above.  What should I do?  Talk to your doctor about your options. But know that mammograms are proven to  reduce cancer deaths. Plus, a yearly mammogram is even more important for women who have a higher risk of breast cancer. Your doctor may order other tests as well.  You should also know how your breasts look and feel. Any time you feel or see something that isn't normal, tell your doctor.  For informational purposes only. Not to replace the advice of your health care provider. Images courtesy American College of Radiology (ACR)   and Society of Breast Imaging (SBI). Used with permission. Text copyright   2014 [employer]. All rights reserved. [employer] 210437 - 08/14.    Magnetic Resonance Imaging (MRI) of the Breast  Magnetic resonance imaging (MRI) of the breast is an imaging test that uses strong magnets and radio waves to form pictures of the inside of the breast. It also creates images of the tissues that surround the breast. Breast MRI is used to check for problems, such as a leaking breast implant or a suspicious lump or mass. It can also be used to help determine if breast cancer is present and aid in diagnosis and management. Most MRI tests take 30 to 60 minutes. Depending on the type of MRI you are having, the test may take longer. Give yourself extra time to check in.      During a breast MRI, you lie face down on a platform that slides into a tubelike machine called a scanner.   Before your test    Follow any directions you are given for taking medicines and for not eating or drinking before the test.    Follow your normal daily routine unless your provider tells you otherwise.    You ll be asked to remove your watch, hearing aids, credit cards, pens, eyeglasses, and other metal objects.    You may be asked to remove your makeup. Makeup may contain some metal.  MRI uses strong magnets. Metal is affected by magnets and can distort the image. The magnet used in MRI can cause metal objects in your body to move. If you have a metal implant, you may not be able to have an MRI unless the implant  is certified as MRI safe. People with these implants should not have an MRI:    Ear (cochlear) implants    Certain clips used for brain aneurysms    Certain metal coils put in blood vessels    Most defibrillators    Most pacemakers  The radiologist or technologist may ask you if you:    Have had stereotactic breast biopsy    Have a pacemaker or implanted cardiac defibrillator    Have an artificial body part (prosthesis)    Have metal rods, screws, plates, or splinters in your body    Wear a medicated adhesive patch    Have tattoos or body piercings. Some tattoo inks contain metal.    Have implanted nerve stimulators or drug-infusion ports    Work with metal    Have braces    Have a bullet or other metal in your body  Tell the radiologist or technologist if you:    Are allergic to any medicines    Are pregnant or think you may be pregnant    Are afraid of small, enclosed spaces (claustrophobic)    Have any serious health problems (If you have kidney disease or a liver transplant  you may not be able to have the contrast material used for MRI)    Have had previous surgeries    Are breastfeeding   During your test    You may be asked to wear a hospital gown.    You may be given earplugs to wear if you desire.    You may be injected with contrast through an intravenous (IV) line in your hand or arm. This is a special dye that makes the MRI image sharp that may be needed depending on the reason for your exam.    You ll lie on a platform that slides into a tube-like machine called a scanner. You ll be on your stomach with your breasts placed through openings in the platform.    Remain as still as you can while the camera takes the pictures. This will ensure the best images.  After your test    You can get back to normal activities right away.    If you were given contrast, it will pass naturally through your body within a day.    Drink lots of water so that the dye passes quickly out of your body.  Getting your  results  Your healthcare provider will discuss the test results with you during a follow-up appointment or over the phone.  Date Last Reviewed: 2/1/2017 2000-2017 [employer]. [address] [place] [address], [place] [address]. All rights reserved. This information is not intended as a substitute for professional medical care. Always follow your healthcare professional's instructions.

## 2018-07-02 ENCOUNTER — RADIANT APPOINTMENT (OUTPATIENT)
Dept: MAMMOGRAPHY | Facility: CLINIC | Age: 33
End: 2018-07-02
Attending: CLINICAL NURSE SPECIALIST
Payer: COMMERCIAL

## 2018-07-02 ENCOUNTER — RADIANT APPOINTMENT (OUTPATIENT)
Dept: ULTRASOUND IMAGING | Facility: CLINIC | Age: 33
End: 2018-07-02
Attending: CLINICAL NURSE SPECIALIST
Payer: COMMERCIAL

## 2018-07-02 ENCOUNTER — TELEPHONE (OUTPATIENT)
Dept: SURGERY | Facility: CLINIC | Age: 33
End: 2018-07-02

## 2018-07-02 DIAGNOSIS — Z91.89 AT RISK FOR CANCER: ICD-10-CM

## 2018-07-02 DIAGNOSIS — Z15.01 BRCA1 POSITIVE: ICD-10-CM

## 2018-07-02 DIAGNOSIS — Z15.09 BRCA1 POSITIVE: ICD-10-CM

## 2018-07-02 DIAGNOSIS — Z80.41 FAMILY HISTORY OF MALIGNANT NEOPLASM OF OVARY: ICD-10-CM

## 2018-07-02 NOTE — TELEPHONE ENCOUNTER
PREVISIT INFORMATION                                                    Celeste Arguello scheduled for future visit at [facility] specialty clinics.    Patient is scheduled to see Dr. Dudley on 7/9/18  Reason for visit: BRCA1 Positive  Referring provider ENRIQUE Cat [facility]  Has patient seen previous specialist? No  Medical Records:  Available in chart.  Patient was previously seen at a [place] or [facility] facility.    REVIEW                                                      New patient packet mailed to patient: Yes  Medication reconciliation complete: No      Current Outpatient Prescriptions   Medication Sig Dispense Refill     citalopram (CELEXA) 20 MG tablet Take 1 tablet (20 mg) by mouth daily (Patient not taking: Reported on 6/29/2018) 90 tablet 4     Fluocinolone Acetonide Scalp 0.01 % OIL oil APPLY TO SCALP TWICE A DAY FOR UP TO 3 WEEKS. WAIT 1 WEEK BETWEEN TREATMENTS.  4     ketoconazole (NIZORAL) 2 % shampoo LATHER IN SCALP FOR 5 MINUTES PRIOR TO RINSING. USE EVERY OTHER DAY FOR 1 WEEK.  0     levonorgestrel (MIRENA) 20 MCG/24HR IUD 1 Device by Intrauterine route       levonorgestrel (MIRENA, 52 MG,) 20 MCG/24HR IUD 1 Device by Intrauterine route Was inserted [facility]  2-       meclizine (ANTIVERT) 25 MG tablet Take 25 mg by mouth 3 times daily as needed       nicotine polacrilex (NICORETTE) 2 MG gum Place 1 each (2 mg) inside cheek as needed for smoking cessation 150 tablet 3     norgestimate-ethinyl estradiol (ORTHO-CYCLEN, SPRINTEC) 0.25-35 MG-MCG per tablet Take active pill daily. 84 tablet 3     ondansetron (ZOFRAN-ODT) 4 MG ODT tab Take 4 mg by mouth every 12 hours         Allergies: Nkda [no known drug allergies]        PLAN/FOLLOW-UP NEEDED                                                      Previsit review complete.  Patient will see provider at future scheduled appointment.     Patient Reminders Given:  Please, make sure you bring an  updated list of your medications.   If you are having a procedure, please, present 15 minutes early.  If you need to cancel or reschedule,please call [phone].    Rubi Mendoza LPN

## 2018-07-05 ENCOUNTER — CARE COORDINATION (OUTPATIENT)
Dept: PLASTIC SURGERY | Facility: CLINIC | Age: 33
End: 2018-07-05

## 2018-07-05 DIAGNOSIS — N83.291 COMPLEX CYST OF RIGHT OVARY: Primary | ICD-10-CM

## 2018-07-09 ENCOUNTER — OFFICE VISIT (OUTPATIENT)
Dept: SURGERY | Facility: CLINIC | Age: 33
End: 2018-07-09
Payer: COMMERCIAL

## 2018-07-09 VITALS
BODY MASS INDEX: 21.36 KG/M2 | DIASTOLIC BLOOD PRESSURE: 80 MMHG | SYSTOLIC BLOOD PRESSURE: 118 MMHG | HEIGHT: 62 IN | WEIGHT: 116.1 LBS | HEART RATE: 69 BPM

## 2018-07-09 DIAGNOSIS — Z15.09 BRCA1 GENE MUTATION POSITIVE: Primary | ICD-10-CM

## 2018-07-09 DIAGNOSIS — Z15.01 BRCA1 GENE MUTATION POSITIVE: Primary | ICD-10-CM

## 2018-07-09 PROCEDURE — 99243 OFF/OP CNSLTJ NEW/EST LOW 30: CPT | Performed by: SURGERY

## 2018-07-09 NOTE — Clinical Note
2018         RE: Celeste Arguello  [address]  [address]  [facility] [address]        Dear Colleague,    Thank you for referring your patient, Celeste Arguello, to the [facility]. Please see a copy of my visit note below.    NEW CONSULTATION  2018    Celeste Arguello is a 33 year old woman who presents with a deleterious BRCA1 mutation.  She was referred by Miesha Flowers.    HPI:    She was diagnosed with the deleterious BRCA 1 mutation on Camille 15.    She noted no masses in either breast, axilla, or neck. She denies any nipple discharge or nipple inversion.    Imaging was unremarkable.    BREAST-SPECIFIC HISTORY:  Prior breast biopsies: No  Prior breast surgeries: No  Prior radiation history: No  Hormone replacement therapy: No  Bra size: 32B    GYN HISTORY:    Age at 1st pregnancy: 24  Age at menarche: 12  Breastfeeding history: Yes  Menopausal? Mirena and OCP    FAMILY HISTORY:  Breast ca: Yes  Ovarian ca: Yes    Past Medical History:   Diagnosis Date     BRCA1 gene mutation positive 6/15/2018    BRCA1 c.181T>G (p.C61G) tested at [facility] 2018     Complex Ovarian cyst-- incidental US finding R only 2012-> resolved     Mirena IUD -- strings not visible --> Plan RETRIEVE / replace by 3/10/15 2012    2/9/15: US documents placement in uterus. 2012: Documented placement in uterus / US.  3/10/2009: Mirena inserted.       Normal puberty age 10    cycles q 30 x 7, heavy w cramps     Right 3 cm soft, painful to palpation, ovarian cyst 2014    recurrent physiologic ovarian cyst -> US if pain persists.      Vertigo        Past Surgical History:   Procedure Laterality Date     LAPAROSCOPY DIAGNOSTIC (GYN)      endometriosis     urethral reimplant  age 6   No GA issues    Current Outpatient Prescriptions   Medication Sig Dispense Refill     citalopram (CELEXA) 20 MG tablet Take 1 tablet (20 mg) by mouth daily (Patient not taking:  Reported on 6/29/2018) 90 tablet 4     Fluocinolone Acetonide Scalp 0.01 % OIL oil APPLY TO SCALP TWICE A DAY FOR UP TO 3 WEEKS. WAIT 1 WEEK BETWEEN TREATMENTS.  4     ketoconazole (NIZORAL) 2 % shampoo LATHER IN SCALP FOR 5 MINUTES PRIOR TO RINSING. USE EVERY OTHER DAY FOR 1 WEEK.  0     levonorgestrel (MIRENA) 20 MCG/24HR IUD 1 Device by Intrauterine route       levonorgestrel (MIRENA, 52 MG,) 20 MCG/24HR IUD 1 Device by Intrauterine route Was inserted [facility]  2-       meclizine (ANTIVERT) 25 MG tablet Take 25 mg by mouth 3 times daily as needed       nicotine polacrilex (NICORETTE) 2 MG gum Place 1 each (2 mg) inside cheek as needed for smoking cessation 150 tablet 3     norgestimate-ethinyl estradiol (ORTHO-CYCLEN, SPRINTEC) 0.25-35 MG-MCG per tablet Take active pill daily. 84 tablet 3     ondansetron (ZOFRAN-ODT) 4 MG ODT tab Take 4 mg by mouth every 12 hours             Allergies   Allergen Reactions     Nkda [No Known Drug Allergies]         SOCIAL HISTORY:  Smokes: Yes, 1/2 to 1 ppd x 6 years  EtOH: Yes, rarely  Illicit drugs: No     She works as a mental health counselor    ROS:  Easy bruising/bleeding: No    There were no vitals taken for this visit.   Physical Exam   Constitutional: She is well-developed, well-nourished, and in no distress.   Pulmonary/Chest: Effort normal. No respiratory distress. Right breast exhibits no inverted nipple, no mass, no nipple discharge, no skin change and no tenderness. Left breast exhibits no inverted nipple, no mass, no nipple discharge, no skin change and no tenderness.   Lymphadenopathy:     She has no cervical adenopathy.        Right cervical: No superficial cervical, no deep cervical and no posterior cervical adenopathy present.       Left cervical: No superficial cervical, no deep cervical and no posterior cervical adenopathy present.     She has no axillary adenopathy.        Right axillary: No pectoral and no lateral adenopathy present.        Left axillary:  No pectoral and no lateral adenopathy present.       Right: No supraclavicular adenopathy present.        Left: No supraclavicular adenopathy present.   No lymphedema in bilateral upper extremities.    Skin: Skin is warm and dry.        INVESTIGATIONS:    Screening Mammogram (7/2/2018) showed:  TECHNIQUE:  Tomosynthesis  BREAST DENSITY: Extremely dense.  COMMENTS: No suspicious finding.  IMPRESSION: BI-RADS CATEGORY: 1 -  NEGATIVE.    ASSESSMENT:  Celeste Arguello is a 33 year old woman with a deleterious BRCA1 mutation.    The natural history of BRCA1 and breast cancer were discussed with the patient.  We reviewed the risk reduction benefits of a prophylactic mastectomy. We reviewed that a prophylactic mastectomy does not eliminate the risk of breast cancer entirely, but rather is a relative risk reduction strategy, by reducing the risk by approximately 90%. In the setting of a positive BRCA1 mutation, the absolute benefits outweigh the risks.      We discussed the various types of mastectomy, including total, skin-sparing, and nipple-sparing mastectomy.  We reviewed that the nipple-sparing technique is cosmetic; sensation and contractility will likely be lost.      We briefly reviewed the various types of reconstruction, immediate versus delayed, autologous versus implant-based. Celeste Arguello IS interested in breast reconstruction and a consultation with Plastic Surgery will be arranged.  She is scheduled to meet with Dr Soliman on 7/31/2018.  The risks of bilateral mastectomy were discussed with the patient, including the risks of bleeding, wound infection, wound dehiscence, skin flap/nipple necrosis, and seroma formation.      Celeste Arguello is NOT a candidate for nipple-sparing mastectomy due to her active smoking status. We spent some time today discussing smoking cessation. If she can be off of cigarettes, then she may become a candidate for nipple-sparing mastectomy.    We also reviewed  that subsequent to bilateral prophylactic mastectomy, breast cancer screening will be based upon clinical exam.     We also reviewed alternatives to prophylactic mastectomy, including increased intensity screening with breast MRI alternating with mammograms every 6 months.  Additional risk reduction strategies for breast (and ovarian) cancer include prophylactic oophorectomy, and endocrine therapy.  Bilateral oophorectomy actually can decrease her risk of breast cancer by approximately 50%.  She is scheduled to meet with gyn-onc next week.    Finally, while she is making her decision, it is important that she continue with high risk screening (MRI and mammograms).  Her next MRI is scheduled for December 2018.    All questions were answered.  She did seem to understand the above.  Celeste Arguello will meet with Dr Soliman and let us know how she would like to proceed.    Total time spent with the patient was 45 minutes, of which more than half was counseling.     PLAN:  1. Plastic surgery consultation  2. Bilateral prophylactic surgery - nipple-sparing or skin-sparing will depend on smoking status and patient preference  3. Continue mammograms alternating with MRI - next scheduled for December 2018    Sheridan Dudley MD MSc [facility] FACS    Division of Surgical Oncology  [facility]     Again, thank you for allowing me to participate in the care of your patient.        Sincerely,        Sheridan Dudley MD

## 2018-07-09 NOTE — LETTER
2018      RE: Celeste Arguello  [address]  [address]  [facility] [address]     2018    Miesha Flowers, APRN CNS  [address] [place] [address]    RE: Celeste Arguello  (: 1985)    Dear Miesha Flowers    Your patient was seen for evaluation in my office.  Please find a copy of my notes for your record and review.  If you have any further questions, please feel free to contact my office.   Thank you for your kind referral.    Sincerely,   Sheridan Dudley MD MSc Located within [facility] FACS    ---        NEW CONSULTATION  2018    Celeste Arguello is a 33 year old woman who presents with a deleterious BRCA1 mutation.  She was referred by Miesha Flowers.    HPI:    She was diagnosed with the deleterious BRCA 1 mutation on Camille 15.    She noted no masses in either breast, axilla, or neck. She denies any nipple discharge or nipple inversion.    Imaging was unremarkable.    BREAST-SPECIFIC HISTORY:  Prior breast biopsies: No  Prior breast surgeries: No  Prior radiation history: No  Hormone replacement therapy: No  Bra size: 32B    GYN HISTORY:    Age at 1st pregnancy: 24  Age at menarche: 12  Breastfeeding history: Yes  Menopausal? Mirena and OCP    FAMILY HISTORY:  Breast ca: Yes  Ovarian ca: Yes    Past Medical History:   Diagnosis Date     BRCA1 gene mutation positive 6/15/2018    BRCA1 c.181T>G (p.C61G) tested at [facility] 2018     Complex Ovarian cyst-- incidental US finding R only 2012-> resolved     Mirena IUD -- strings not visible --> Plan RETRIEVE / replace by 3/10/15 2012    2/9/15: US documents placement in uterus. 2012: Documented placement in uterus / US.  3/10/2009: Mirena inserted.       Normal puberty age 10    cycles q 30 x 7, heavy w cramps     Right 3 cm soft, painful to palpation, ovarian cyst 2014    recurrent physiologic ovarian cyst -> US if pain persists.      Vertigo        Past Surgical History:   Procedure Laterality Date      LAPAROSCOPY DIAGNOSTIC (GYN)  2006    endometriosis     urethral reimplant  age 6   No GA issues    Current Outpatient Prescriptions   Medication Sig Dispense Refill     citalopram (CELEXA) 20 MG tablet Take 1 tablet (20 mg) by mouth daily (Patient not taking: Reported on 6/29/2018) 90 tablet 4     Fluocinolone Acetonide Scalp 0.01 % OIL oil APPLY TO SCALP TWICE A DAY FOR UP TO 3 WEEKS. WAIT 1 WEEK BETWEEN TREATMENTS.  4     ketoconazole (NIZORAL) 2 % shampoo LATHER IN SCALP FOR 5 MINUTES PRIOR TO RINSING. USE EVERY OTHER DAY FOR 1 WEEK.  0     levonorgestrel (MIRENA) 20 MCG/24HR IUD 1 Device by Intrauterine route       levonorgestrel (MIRENA, 52 MG,) 20 MCG/24HR IUD 1 Device by Intrauterine route Was inserted [facility]  2-       meclizine (ANTIVERT) 25 MG tablet Take 25 mg by mouth 3 times daily as needed       nicotine polacrilex (NICORETTE) 2 MG gum Place 1 each (2 mg) inside cheek as needed for smoking cessation 150 tablet 3     norgestimate-ethinyl estradiol (ORTHO-CYCLEN, SPRINTEC) 0.25-35 MG-MCG per tablet Take active pill daily. 84 tablet 3     ondansetron (ZOFRAN-ODT) 4 MG ODT tab Take 4 mg by mouth every 12 hours             Allergies   Allergen Reactions     Nkda [No Known Drug Allergies]         SOCIAL HISTORY:  Smokes: Yes, 1/2 to 1 ppd x 6 years  EtOH: Yes, rarely  Illicit drugs: No     She works as a mental health counselor    ROS:  Easy bruising/bleeding: No    There were no vitals taken for this visit.   Physical Exam   Constitutional: She is well-developed, well-nourished, and in no distress.   Pulmonary/Chest: Effort normal. No respiratory distress. Right breast exhibits no inverted nipple, no mass, no nipple discharge, no skin change and no tenderness. Left breast exhibits no inverted nipple, no mass, no nipple discharge, no skin change and no tenderness.   Lymphadenopathy:     She has no cervical adenopathy.        Right cervical: No superficial cervical, no deep cervical and no posterior  cervical adenopathy present.       Left cervical: No superficial cervical, no deep cervical and no posterior cervical adenopathy present.     She has no axillary adenopathy.        Right axillary: No pectoral and no lateral adenopathy present.        Left axillary: No pectoral and no lateral adenopathy present.       Right: No supraclavicular adenopathy present.        Left: No supraclavicular adenopathy present.   No lymphedema in bilateral upper extremities.    Skin: Skin is warm and dry.        INVESTIGATIONS:    Screening Mammogram (7/2/2018) showed:  TECHNIQUE:  Tomosynthesis  BREAST DENSITY: Extremely dense.  COMMENTS: No suspicious finding.  IMPRESSION: BI-RADS CATEGORY: 1 -  NEGATIVE.    ASSESSMENT:  Celeste Arguello is a 33 year old woman with a deleterious BRCA1 mutation.    The natural history of BRCA1 and breast cancer were discussed with the patient.  We reviewed the risk reduction benefits of a prophylactic mastectomy. We reviewed that a prophylactic mastectomy does not eliminate the risk of breast cancer entirely, but rather is a relative risk reduction strategy, by reducing the risk by approximately 90%. In the setting of a positive BRCA1 mutation, the absolute benefits outweigh the risks.      We discussed the various types of mastectomy, including total, skin-sparing, and nipple-sparing mastectomy.  We reviewed that the nipple-sparing technique is cosmetic; sensation and contractility will likely be lost.      We briefly reviewed the various types of reconstruction, immediate versus delayed, autologous versus implant-based. Celeste Arguello IS interested in breast reconstruction and a consultation with Plastic Surgery will be arranged.  She is scheduled to meet with Dr Soliman on 7/31/2018.  The risks of bilateral mastectomy were discussed with the patient, including the risks of bleeding, wound infection, wound dehiscence, skin flap/nipple necrosis, and seroma formation.      Celeste  Yakelin Arguello is NOT a candidate for nipple-sparing mastectomy due to her active smoking status. We spent some time today discussing smoking cessation. If she can be off of cigarettes, then she may become a candidate for nipple-sparing mastectomy.    We also reviewed that subsequent to bilateral prophylactic mastectomy, breast cancer screening will be based upon clinical exam.     We also reviewed alternatives to prophylactic mastectomy, including increased intensity screening with breast MRI alternating with mammograms every 6 months.  Additional risk reduction strategies for breast (and ovarian) cancer include prophylactic oophorectomy, and endocrine therapy.  Bilateral oophorectomy actually can decrease her risk of breast cancer by approximately 50%.  She is scheduled to meet with gyn-onc next week.    Finally, while she is making her decision, it is important that she continue with high risk screening (MRI and mammograms).  Her next MRI is scheduled for December 2018.    All questions were answered.  She did seem to understand the above.  Celeste Arguello will meet with Dr Soliman and let us know how she would like to proceed.    Total time spent with the patient was 45 minutes, of which more than half was counseling.     PLAN:  1. Plastic surgery consultation  2. Bilateral prophylactic surgery - nipple-sparing or skin-sparing will depend on smoking status and patient preference  3. Continue mammograms alternating with MRI - next scheduled for December 2018    Sheridan Dudley MD MSc [facility] FACS    Division of Surgical Oncology  [facility]

## 2018-07-09 NOTE — NURSING NOTE
Celeste Arguello's goals for this visit include: BRCA 1 +  She requests these members of her care team be copied on today's visit information: yes    PCP: Shanthi Nuñez    Referring Provider:  ENRIQUE Cat [address] [address]    There were no vitals taken for this visit.    Do you need any medication refills at today's visit? No    Rubi Mendoza LPN

## 2018-07-09 NOTE — PROGRESS NOTES
NEW CONSULTATION  2018    Celeste Arguello is a 33 year old woman who presents with a deleterious BRCA1 mutation.  She was referred by Miesha Flowers.    HPI:    She was diagnosed with the deleterious BRCA 1 mutation on Camille 15.    She noted no masses in either breast, axilla, or neck. She denies any nipple discharge or nipple inversion.    Imaging was unremarkable.    BREAST-SPECIFIC HISTORY:  Prior breast biopsies: No  Prior breast surgeries: No  Prior radiation history: No  Hormone replacement therapy: No  Bra size: 32B    GYN HISTORY:    Age at 1st pregnancy: 24  Age at menarche: 12  Breastfeeding history: Yes  Menopausal? Mirena and OCP    FAMILY HISTORY:  Breast ca: Yes  Ovarian ca: Yes    Past Medical History:   Diagnosis Date     BRCA1 gene mutation positive 6/15/2018    BRCA1 c.181T>G (p.C61G) tested at [facility] 2018     Complex Ovarian cyst-- incidental US finding R only 2012-> resolved     Mirena IUD -- strings not visible --> Plan RETRIEVE / replace by 3/10/15 2012    2/9/15: US documents placement in uterus. 2012: Documented placement in uterus / US.  3/10/2009: Mirena inserted.       Normal puberty age 10    cycles q 30 x 7, heavy w cramps     Right 3 cm soft, painful to palpation, ovarian cyst 2014    recurrent physiologic ovarian cyst -> US if pain persists.      Vertigo        Past Surgical History:   Procedure Laterality Date     LAPAROSCOPY DIAGNOSTIC (GYN)      endometriosis     urethral reimplant  age 6   No GA issues    Current Outpatient Prescriptions   Medication Sig Dispense Refill     citalopram (CELEXA) 20 MG tablet Take 1 tablet (20 mg) by mouth daily (Patient not taking: Reported on 2018) 90 tablet 4     Fluocinolone Acetonide Scalp 0.01 % OIL oil APPLY TO SCALP TWICE A DAY FOR UP TO 3 WEEKS. WAIT 1 WEEK BETWEEN TREATMENTS.  4     ketoconazole (NIZORAL) 2 % shampoo LATHER IN SCALP FOR 5 MINUTES PRIOR TO RINSING. USE EVERY OTHER  DAY FOR 1 WEEK.  0     levonorgestrel (MIRENA) 20 MCG/24HR IUD 1 Device by Intrauterine route       levonorgestrel (MIRENA, 52 MG,) 20 MCG/24HR IUD 1 Device by Intrauterine route Was inserted [facility]  2-       meclizine (ANTIVERT) 25 MG tablet Take 25 mg by mouth 3 times daily as needed       nicotine polacrilex (NICORETTE) 2 MG gum Place 1 each (2 mg) inside cheek as needed for smoking cessation 150 tablet 3     norgestimate-ethinyl estradiol (ORTHO-CYCLEN, SPRINTEC) 0.25-35 MG-MCG per tablet Take active pill daily. 84 tablet 3     ondansetron (ZOFRAN-ODT) 4 MG ODT tab Take 4 mg by mouth every 12 hours             Allergies   Allergen Reactions     Nkda [No Known Drug Allergies]         SOCIAL HISTORY:  Smokes: Yes, 1/2 to 1 ppd x 6 years  EtOH: Yes, rarely  Illicit drugs: No     She works as a mental health counselor    ROS:  Easy bruising/bleeding: No    There were no vitals taken for this visit.   Physical Exam   Constitutional: She is well-developed, well-nourished, and in no distress.   Pulmonary/Chest: Effort normal. No respiratory distress. Right breast exhibits no inverted nipple, no mass, no nipple discharge, no skin change and no tenderness. Left breast exhibits no inverted nipple, no mass, no nipple discharge, no skin change and no tenderness.   Lymphadenopathy:     She has no cervical adenopathy.        Right cervical: No superficial cervical, no deep cervical and no posterior cervical adenopathy present.       Left cervical: No superficial cervical, no deep cervical and no posterior cervical adenopathy present.     She has no axillary adenopathy.        Right axillary: No pectoral and no lateral adenopathy present.        Left axillary: No pectoral and no lateral adenopathy present.       Right: No supraclavicular adenopathy present.        Left: No supraclavicular adenopathy present.   No lymphedema in bilateral upper extremities.    Skin: Skin is warm and dry.        INVESTIGATIONS:    Screening  Mammogram (7/2/2018) showed:  TECHNIQUE:  Tomosynthesis  BREAST DENSITY: Extremely dense.  COMMENTS: No suspicious finding.  IMPRESSION: BI-RADS CATEGORY: 1 -  NEGATIVE.    ASSESSMENT:  Celeste Arguello is a 33 year old woman with a deleterious BRCA1 mutation.    The natural history of BRCA1 and breast cancer were discussed with the patient.  We reviewed the risk reduction benefits of a prophylactic mastectomy. We reviewed that a prophylactic mastectomy does not eliminate the risk of breast cancer entirely, but rather is a relative risk reduction strategy, by reducing the risk by approximately 90%. In the setting of a positive BRCA1 mutation, the absolute benefits outweigh the risks.      We discussed the various types of mastectomy, including total, skin-sparing, and nipple-sparing mastectomy.  We reviewed that the nipple-sparing technique is cosmetic; sensation and contractility will likely be lost.      We briefly reviewed the various types of reconstruction, immediate versus delayed, autologous versus implant-based. Celeste Arguello IS interested in breast reconstruction and a consultation with Plastic Surgery will be arranged.  She is scheduled to meet with Dr Soliman on 7/31/2018.  The risks of bilateral mastectomy were discussed with the patient, including the risks of bleeding, wound infection, wound dehiscence, skin flap/nipple necrosis, and seroma formation.      Celeste Arguello is NOT a candidate for nipple-sparing mastectomy due to her active smoking status. We spent some time today discussing smoking cessation. If she can be off of cigarettes, then she may become a candidate for nipple-sparing mastectomy.    We also reviewed that subsequent to bilateral prophylactic mastectomy, breast cancer screening will be based upon clinical exam.     We also reviewed alternatives to prophylactic mastectomy, including increased intensity screening with breast MRI alternating with mammograms every 6  months.  Additional risk reduction strategies for breast (and ovarian) cancer include prophylactic oophorectomy, and endocrine therapy.  Bilateral oophorectomy actually can decrease her risk of breast cancer by approximately 50%.  She is scheduled to meet with gyn-onc next week.    Finally, while she is making her decision, it is important that she continue with high risk screening (MRI and mammograms).  Her next MRI is scheduled for December 2018.    All questions were answered.  She did seem to understand the above.  Celeste Yakelin Arguello will meet with Dr Soliman and let us know how she would like to proceed.    Total time spent with the patient was 45 minutes, of which more than half was counseling.     PLAN:  1. Plastic surgery consultation  2. Bilateral prophylactic surgery - nipple-sparing or skin-sparing will depend on smoking status and patient preference  3. Continue mammograms alternating with MRI - next scheduled for December 2018    Sheridan Dudley MD MSc [facility] FACS    Division of Surgical Oncology  [facility]

## 2018-07-09 NOTE — MR AVS SNAPSHOT
After Visit Summary   7/9/2018    Celeste Arguelol    MRN: [id number]           Patient Information     Date Of Birth          1985        Visit Information        Provider Department      7/9/2018 1:30 PM Sheridan Dudley MD [facility]        Today's Diagnoses     BRCA1 gene mutation positive    -  1       Follow-ups after your visit        Your next 10 appointments already scheduled     Jul 19, 2018 12:30 PM CDT   New Visit with Marybeth King MD   [facility]    [address] [place] [id number]   [phone]            Jul 31, 2018  9:45 AM CDT   (Arrive by 9:30 AM)   New Patient Visit with FERN Soliman MD   [facility] ([facility])    [address] [place] [address]  [address] [place] [id number] [phone]            Aug 27, 2018  6:00 PM CDT   (Arrive by 5:45 PM)   New Patient Visit with Fracisco Spencer MD   [facility] ([facility])    [address] [place] [address]  3rd Floor  [facility] [id number] [phone]            Dec 19, 2018  1:00 PM CST   MR BREAST BILATERAL W/O & W CONTRAST with UC1   [facility] ([facility])    [address]  1st Floor  [facility] [id number] [phone]           Take your medicines as usual, unless your doctor tells you not to. Bring a list of your current medicines to your exam (including vitamins, minerals and over-the-counter drugs).  The timing of your exam may depend on the start of your last period. If you re in menopause, you may have the exam anytime.  Please bring any previous mammograms or breast MRIs from other facilities to the MRI dept. Do not mail these items to us.   You will have IV contrast for this exam.  You do not need to do anything special to prepare.  The MRI machine uses a strong magnet. Please wear  clothes without metal (snaps, zippers). A sweatsuit works well, or we may give you a hospital gown.  Please remove any body piercings and hair extensions before you arrive. You will also remove watches, jewelry, hairpins, wallets, dentures, partial dental plates and hearing aids. You may wear contact lenses, and you may be able to wear your rings. We have a safe place to keep your personal items, but it is safer to leave them at home.  **IMPORTANT** THE INSTRUCTIONS BELOW ARE ONLY FOR THOSE PATIENTS WHO HAVE BEEN PRESCRIBED SEDATION OR GENERAL ANESTHESIA DURING THEIR MRI PROCEDURE:  IF YOUR DOCTOR PRESCRIBED ORAL SEDATION (take medicine to help you relax during your exam):   You must get the medicine from your doctor (oral medication) before you arrive. Bring the medicine to the exam. Do not take it at home. You ll be told when to take it upon arriving for your exam.   Arrive one hour early. Bring someone who can take you home after the test. Your medicine will make you sleepy. After the exam, you may not drive, take a bus or take a taxi by yourself.  IF YOUR DOCTOR PRESCRIBED IV SEDATION:   Arrive one hour early. Bring someone who can take you home after the test. Your medicine will make you sleepy. After the exam, you may not drive, take a bus or take a taxi by yourself.   No eating 6 hours before your exam. You may have clear liquids up until 4 hours before your exam. (Clear liquids include water, clear tea, black coffee and fruit juice without pulp.)  IF YOUR DOCTOR PRESCRIBED ANESTHESIA (be asleep for your exam):   Arrive 1 1/2 hours early. Bring someone who can take you home after the test. You may not drive, take a bus or take a taxi by yourself.   No eating 8 hours before your exam. You may have clear liquids up until 4 hours before your exam. (Clear liquids include water, clear tea, black coffee and fruit juice without pulp.)   You will spend four to five hours in the recovery room.  If you have any  questions, please contact your Imaging Department exam site.            Dec 19, 2018  1:45 PM CST   US PELVIC COMPLETE W TRANSVAGINAL with UCUS1   [facility] ([facility])    [address]  1st Floor  [facility] [id number] [phone]           Please bring a list of your medicines (including vitamins, minerals and over-the-counter drugs). Also, tell your doctor about any allergies you may have. Wear comfortable clothes and leave your valuables at home.  Adults: Drink six 8-ounce glasses of fluid one hour before your exam. Do NOT empty your bladder.  If you need to empty your bladder before your exam, try to release only a little bit of urine. Then, drink another 8oz glass of fluid.  Children: Children who are potty trained should drink at least 4 cups (32 oz) of liquid 45 minutes to one hour prior to the exam. The child s bladder must be full in order to achieve a diagnostic exam. If your child is very uncomfortable or has an urgent need to pee, please notify a technologist; they will try to find out how much longer the wait may be and provide instructions to help relieve the pressure. Occasionally it is medically necessary to insert a urinary catheter to fill the bladder.  Please call the Imaging Department at your exam site with any questions.            Jun 21, 2019  1:00 PM CDT   [facility] Lab Draw with  MASONIC LAB DRAW   [facility] ([facility])    [address] [place] [address]  [address]  [facility] [id number] [phone]            Jun 21, 2019  1:30 PM CDT   (Arrive by 1:15 PM)   Return Visit with ENRIQUE Cat   [facility] ([facility])    [address] [place] [address]  [address]  [facility] [address] [phone]              Who to contact     If you have questions or need follow up information about today's clinic visit or your schedule please contact  "[facility] directly at [phone].  Normal or non-critical lab and imaging results will be communicated to you by MyChart, letter or phone within 4 business days after the clinic has received the results. If you do not hear from us within 7 days, please contact the clinic through [employer] or phone. If you have a critical or abnormal lab result, we will notify you by phone as soon as possible.  Submit refill requests through [employer] or call your pharmacy and they will forward the refill request to us. Please allow 3 business days for your refill to be completed.          Additional Information About Your Visit        [employer] Information     [employer] gives you secure access to your electronic health record. If you see a primary care provider, you can also send messages to your care team and make appointments. If you have questions, please call your primary care clinic.  If you do not have a primary care provider, please call [phone] and they will assist you.      [employer] is an electronic gateway that provides easy, online access to your medical records. With [employer], you can request a clinic appointment, read your test results, renew a prescription or communicate with your care team.     To access your existing account, please contact your [facility] or call [phone] for assistance.        Care EveryWhere ID     This is your Care EveryWhere ID. This could be used by other organizations to access your [place] medical records  [id number]        Your Vitals Were     Pulse Height BMI (Body Mass Index)             69 1.575 m (5' 2\") 21.23 kg/m2          Blood Pressure from Last 3 Encounters:   07/09/18 118/80   06/29/18 123/84   04/26/18 125/78    Weight from Last 3 Encounters:   07/09/18 52.7 kg (116 lb 1.6 oz)   06/29/18 53.5 kg (118 lb)   04/26/18 54 kg (119 lb)              Today, you had the following     No orders found for display       Primary Care " Provider Office Phone # Fax #    Shanthi [facility] [phone]       [address] [facility] [address]        Equal Access to Services     TESFAYEKESHAV CRISELDA : Malik liam jennings sherryo Sojassonali, waaxda luqadaha, qaybta kaalmada ademarnida, edgardo gregorymanisha martha. So [facility] [phone].    ATENCIÓN: Si habla español, tiene a de jesus disposición servicios gratuitos de asistencia lingüística. Llame al [phone].    We comply with applicable federal civil rights laws and [place] laws. We do not discriminate on the basis of race, color, national origin, age, disability, sex, sexual orientation, or gender identity.            Thank you!     Thank you for choosing [facility]  for your care. Our goal is always to provide you with excellent care. Hearing back from our patients is one way we can continue to improve our services. Please take a few minutes to complete the written survey that you may receive in the mail after your visit with us. Thank you!             Your Updated Medication List - Protect others around you: Learn how to safely use, store and throw away your medicines at www.disposemymeds.org.          This list is accurate as of 7/9/18  2:43 PM.  Always use your most recent med list.                   Brand Name Dispense Instructions for use Diagnosis    citalopram 20 MG tablet    celeXA    90 tablet    Take 1 tablet (20 mg) by mouth daily    Adjustment disorder with anxious mood       Fluocinolone Acetonide Scalp 0.01 % Oil oil      APPLY TO SCALP TWICE A DAY FOR UP TO 3 WEEKS. WAIT 1 WEEK BETWEEN TREATMENTS.        ketoconazole 2 % shampoo    NIZORAL     LATHER IN SCALP FOR 5 MINUTES PRIOR TO RINSING. USE EVERY OTHER DAY FOR 1 WEEK.        meclizine 25 MG tablet    ANTIVERT     Take 25 mg by mouth 3 times daily as needed        * MIRENA (52 MG) 20 MCG/24HR IUD   Generic drug:  levonorgestrel      1 Device by Intrauterine route Was inserted [facility]  2-         * levonorgestrel 20 MCG/24HR IUD    MIRENA     1 Device by Intrauterine route        nicotine polacrilex 2 MG gum    NICORETTE    150 tablet    Place 1 each (2 mg) inside cheek as needed for smoking cessation    Encounter for smoking cessation counseling       norgestimate-ethinyl estradiol 0.25-35 MG-MCG per tablet    ORTHO-CYCLEN, SPRINTEC    84 tablet    Take active pill daily.    Family history of ovarian cancer, Endometriosis, Oral contraceptive use       ondansetron 4 MG ODT tab    ZOFRAN-ODT     Take 4 mg by mouth every 12 hours        * Notice:  This list has 2 medication(s) that are the same as other medications prescribed for you. Read the directions carefully, and ask your doctor or other care provider to review them with you.

## 2018-07-19 ENCOUNTER — ONCOLOGY VISIT (OUTPATIENT)
Dept: ONCOLOGY | Facility: CLINIC | Age: 33
End: 2018-07-19
Payer: COMMERCIAL

## 2018-07-19 VITALS
RESPIRATION RATE: 16 BRPM | BODY MASS INDEX: 21.11 KG/M2 | HEART RATE: 64 BPM | WEIGHT: 115.4 LBS | TEMPERATURE: 98.5 F | DIASTOLIC BLOOD PRESSURE: 80 MMHG | SYSTOLIC BLOOD PRESSURE: 122 MMHG | OXYGEN SATURATION: 100 %

## 2018-07-19 DIAGNOSIS — Z15.09 BRCA1 GENE MUTATION POSITIVE: Primary | ICD-10-CM

## 2018-07-19 DIAGNOSIS — Z15.01 BRCA1 GENE MUTATION POSITIVE: Primary | ICD-10-CM

## 2018-07-19 PROCEDURE — 99205 OFFICE O/P NEW HI 60 MIN: CPT | Performed by: OBSTETRICS & GYNECOLOGY

## 2018-07-19 ASSESSMENT — PAIN SCALES - GENERAL: PAINLEVEL: MILD PAIN (3)

## 2018-07-19 NOTE — PROGRESS NOTES
Consult Notes on Referred Patient        Dr. Miesha Flowers, APRN CNS  [address] [place] [address]       RE: Celeste Arguello  : 1985  KELLY: 2018    Dear Dr. Miesha Flowers:    I had the pleasure of seeing your patient Celeste Arguello here at the Gynecologic Cancer Clinic at the [facility] on 2018.  As you know she is a very pleasant 33 year old woman with a recent diagnosis of BRCA1.  Given these findings she was subsequently sent to the Gynecologic Cancer Clinic for new patient consultation.  She is accompanied today by her good friend.    She underwent genetic testing because her maternal cousin was found to have a mutation and she has a strong family history of breast and ovarian cancer.    2018 - POSITIVE for a BRCA1 mutation. Specifically her mutation is called c.181T>G (p.C61G)    18:  US Pelvis:  The uterus measures 6.9 x 4.7 x 2.8 cm, and there is no evidence of a focal fibroid.  The endometrium is within normal limits and measures 3 mm. There is no free fluid in the pelvis. Intrauterine device in good position.  The right ovary measures 2.2 x 2.7 x 3.2 cm. There is 2.2 x 2.5 x 2.5 cm complex cyst within the right ovary, newly appreciated since . The left ovary is partially obscured by overlying bowel gas but is not enlarged when visualized in the transverse plane, measuring 1.5 x 1.8 cm. There is normal blood flow to the ovaries.         Review of Systems:  Systemic           no weight changes; no fever; no chills; no night sweats; no appetite changes  Skin           no rashes, or lesions  Eye           no irritation; no changes in vision  Esteban-Laryngeal           no dysphagia; no hoarseness   Pulmonary    no cough; no shortness of breath  Cardiovascular    no chest pain; no palpitations  Gastrointestinal    no diarrhea; no constipation; no abdominal pain; no changes in bowel  habits; no blood in stool  Genitourinary   no  urinary frequency; no urinary urgency; no dysuria; no pain; no abnormal vaginal discharge; no abnormal vaginal bleeding  Breast    no breast discharge; no breast changes; no breast pain  Musculoskeletal    no myalgias; no arthralgias; no back pain  Psychiatric           no depressed mood; no anxiety    Hematologic            no tender lymph nodes; no noticeable swellings or lumps   Endocrine    no hot flashes; no heat/cold intolerance         Neurological   no tremor; no numbness and [id number]; no headaches; no difficulty sleeping    Obstetrics and Gynecology History:  ,  x 1  She does not desire future fertility      Past Medical History:  Past Medical History:   Diagnosis Date     BRCA1 gene mutation positive 6/15/2018    BRCA1 c.181T>G (p.C61G) tested at [facility] 2018       Past Surgical History:  Past Surgical History:   Procedure Laterality Date     LAPAROSCOPY DIAGNOSTIC (GYN)      endometriosis     urethral reimplant  age 6       Health Maintenance:  Last Pap Smear: 18              Result: negative, HPV negative  She has not had a history of abnormal Pap smears.    Last Mammogram: 18              Result: normal      She has not had a history of abnormal mammograms.    Last Colonoscopy: N/A      Current Medications:   has a current medication list which includes the following prescription(s): citalopram, fluocinolone acetonide scalp, ketoconazole, levonorgestrel, levonorgestrel, meclizine, nicotine polacrilex, norgestimate-ethinyl estradiol, and ondansetron.     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 One    How much calcium per day? Dairy products       How much caffeine per day? 1 cup coffee and 2 can of coke    How much vitamin D per day? None    Do you/your family wear seatbelts?  Yes    Do you/your family use safety helmets? yes    Do you/your family use sunscreen? Yes    Do you/your family keep firearms in the home? No    Do you/your family have a smoke detector(s)? Yes        Do you feel safe in your home? Yes    Has anyone ever touched you in an unwanted manner? No     Explain     SEE GLADYS [facility]     2014    Beronica Dumas [facility] 19               Lives with daughter who is 9, feels safe at home.  Works as substance abuse counselor.  Enjoys writing, photography, reading.  Does not have an advanced directive on file and would like her friend, Olga to be her POA.  Full code    Family History:   The patient's family history is significant for.  Family History   Problem Relation Age of Onset     Ovarian Cancer Mother 38      age 47     Hereditary Breast and Ovarian Cancer Syndrome Maternal Aunt 50     Breast cancer gene -- MEHRDAD/BSO and mastectomy     Pancreatic Cancer Maternal Uncle 38     Alcohol/Drug Brother      active     Alcohol/Drug Brother      recovering     Psychotic Disorder Brother      depression and ADHD     Breast Cancer Maternal Aunt 35     & MC age 36     Uterine Cancer Maternal Aunt      C.A.D. No family hx of      Diabetes No family hx of      Hypertension No family hx of      Coronary Artery Disease No family hx of      Cerebrovascular Disease No family hx of      Hyperlipidemia No family hx of      Colon Cancer No family hx of       Prostate Cancer No family hx of      Thyroid Disease No family hx of          Physical Exam:   /80  Pulse 64  Temp 98.5  F (36.9  C) (Oral)  Resp 16  Wt 52.3 kg (115 lb 6.4 oz)  SpO2 100%  BMI 21.11 kg/m2  Body mass index is 21.11 kg/(m^2).    General Appearance: healthy and alert, no distress     HEENT:  no thyromegaly, no palpable nodules or masses        Cardiovascular: regular rate and rhythm, no gallops, rubs or murmurs     Respiratory: lungs clear, no rales, rhonchi or wheezes, normal diaphragmatic excursion    Musculoskeletal: extremities non tender and without edema    Skin: no lesions or rashes     Neurological: normal gait, no gross defects     Psychiatric: appropriate mood and affect                               Hematological: normal cervical, supraclavicular and inguinal lymph nodes     Gastrointestinal:       abdomen soft, non-tender, non-distended, no organomegaly or masses    Genitourinary: External genitalia and urethral meatus appears normal.  Vagina is smooth without nodularity or masses.  Cervix appears normal and without lesions.  Bimanual exam reveal no masses, nodularity or fullness.  Recto-vaginal exam confirms these findings.      Assessment:    Celeste Arguello is a 33 year old woman with a new diagnosis of BRCA1.     A total of 60 minutes was spent with the patient, >50% of which were spent in counseling the patient and/or treatment planning.      Plan:     1.)    BRCA1 mutation.  We discussed her lifetime risks of both ovarian and breat cancer with this mutation.  We discussed that given this the recommendation is for RRSO at the completion of childbearing given her age.  We discussed that this will dramatically decrease her risk of developing ovarian cancer but that there does still remain a small risk of developing primary peritoneal cancer.  We also discussed that RRSO has been shown to decrease her risk of breast cancer.  We also discussed that there is emerging data  that some endometrial cancers may be linked to BRCA and thus discussed the risks and benefits of prophylactic hysterectomy at the time of her surgery and she would like to proceed with hysterectomy.  We discussed the risks of premature surgical menopause and that menopausal symptoms are different for every woman but there are several treatment options available if she desires/needs it post-operatively.  We discussed the risk of BRCA mutation for her family, especially her children and she is aware.  We discussed the small possibility of an occult cancer at the time of surgery and that if this is found, we would proceed with frozen section and the cancer staging/debulking procedure at that time.  We also discussed the possibility of a microscopic/STIC tumor discovered on final pathology and that in this case she would require a second staging procedure.  Risks, benefits, indications and alternatives were discussed with the patient.  She would like to defer her surgery until September and thus she will call when she has a surgical date she would like.    2.) Surgical menopause.  We discussed the options for treatment including hormonal options.  We discussed this in light of her increased risk of breat cancer with the BRCA mutation as well as her smoking status.  She is wanting to quit soon and has nicorrete gum which has helped her quit in the past.      3.) Genetic risk factors were assessed and the patient has a known BRCA1 mutation.      4.) Labs and/or tests ordered include:  none.     5.) Health maintenance issues addressed today include pt is up to date.           Thank you for allowing us to participate in the care of your patient.         Sincerely,    Marybeth Huizar MD  Gynecologic Oncology  [facility]       DONOVAN MAXWELL, KIKI KELLY

## 2018-07-19 NOTE — MR AVS SNAPSHOT
After Visit Summary   7/19/2018    Celeste Arguello    MRN: [id number]           Patient Information     Date Of Birth          1985        Visit Information        Provider Department      7/19/2018 12:30 PM Marybeth Grande MD [facility]        Today's Diagnoses     BRCA1 gene mutation positive    -  1      Care Instructions    Call when you have decided when you would like to proceed with surgery          Follow-ups after your visit        Your next 10 appointments already scheduled     Jul 31, 2018  9:45 AM CDT   (Arrive by 9:30 AM)   New Patient Visit with FERN Soliman MD   [facility] [place] ([facility])    [address]  [address]  [facility] [address] [phone]            Aug 27, 2018  4:45 PM CDT   US PELVIC COMPLETE W TRANSVAGINAL with UCUS1   [facility] [place] [facility] ([facility])    [address]  1st Floor  [facility] [address] [phone]           Please bring a list of your medicines (including vitamins, minerals and over-the-counter drugs). Also, tell your doctor about any allergies you may have. Wear comfortable clothes and leave your valuables at home.  Adults: Drink four 8-ounce glasses of fluid an hour before your exam. If you need to empty your bladder before your exam, try to release only a little urine. Then, drink another glass of fluid.  Children: Children who are potty trained up to 6 years old should drink at least 2 cups (16 oz) of water/non-carbonated beverage 30 minutes prior to the exam. Children who are 6-10 years should drink at least 3 cups (24 oz) of water/non-carbonated beverage 45 minutes prior to the exam. Children who are 10 years or older should drink at least 4 cups (32 oz) of water/non-carbonated beverage 45 minutes prior to the exam. If your child is very uncomfortable or has an urgent need to pee, please notify a technologist;  they will try to find out how much longer the wait may be and provide instructions to help relieve the pressure.  Please call the Imaging Department at your exam site with any questions.            Aug 27, 2018  6:00 PM CDT   (Arrive by 5:45 PM)   New Patient Visit with Fracisco Spencer MD   [facility] ([facility])    [address]  3rd Floor  [facility] [address]   [phone]            Dec 19, 2018  1:00 PM CST   MR BREAST BILATERAL W/O & W CONTRAST with UCMR1   [facility] MRI ([facility])    [address]  1st [facility] [address]   [phone]           Take your medicines as usual, unless your doctor tells you not to. Bring a list of your current medicines to your exam (including vitamins, minerals and over-the-counter drugs).  The timing of your exam may depend on the start of your last period. If you re in menopause, you may have the exam anytime.  Please bring any previous mammograms or breast MRIs from other facilities to the MRI dept. Do not mail these items to us.   You will have IV contrast for this exam.  You do not need to do anything special to prepare.  The MRI machine uses a strong magnet. Please wear clothes without metal (snaps, zippers). A sweatsuit works well, or we may give you a hospital gown.  Please remove any body piercings and hair extensions before you arrive. You will also remove watches, jewelry, hairpins, wallets, dentures, partial dental plates and hearing aids. You may wear contact lenses, and you may be able to wear your rings. We have a safe place to keep your personal items, but it is safer to leave them at home.  **IMPORTANT** THE INSTRUCTIONS BELOW ARE ONLY FOR THOSE PATIENTS WHO HAVE BEEN PRESCRIBED SEDATION OR GENERAL ANESTHESIA DURING THEIR MRI PROCEDURE:  IF YOUR DOCTOR PRESCRIBED ORAL SEDATION (take medicine to help you relax during your exam):   You must get the  medicine from your doctor (oral medication) before you arrive. Bring the medicine to the exam. Do not take it at home. You ll be told when to take it upon arriving for your exam.   Arrive one hour early. Bring someone who can take you home after the test. Your medicine will make you sleepy. After the exam, you may not drive, take a bus or take a taxi by yourself.  IF YOUR DOCTOR PRESCRIBED IV SEDATION:   Arrive one hour early. Bring someone who can take you home after the test. Your medicine will make you sleepy. After the exam, you may not drive, take a bus or take a taxi by yourself.   No eating 6 hours before your exam. You may have clear liquids up until 4 hours before your exam. (Clear liquids include water, clear tea, black coffee and fruit juice without pulp.)  IF YOUR DOCTOR PRESCRIBED ANESTHESIA (be asleep for your exam):   Arrive 1 1/2 hours early. Bring someone who can take you home after the test. You may not drive, take a bus or take a taxi by yourself.   No eating 8 hours before your exam. You may have clear liquids up until 4 hours before your exam. (Clear liquids include water, clear tea, black coffee and fruit juice without pulp.)   You will spend four to five hours in the recovery room.  If you have any questions, please contact your Imaging Department exam site.            Jun 21, 2019  1:00 PM CDT   [facility] with  [facility] LAB DRAW   [facility] Lab Draw ([facility])    [address] [place] [address]  [address]  [facility] [address] [phone]            Jun 21, 2019  1:30 PM CDT   (Arrive by 1:15 PM)   Return Visit with ENRIQUE Cat   [facility] ([facility])    [address] [place] [address]  [address]  [facility] [address] [phone]              Who to contact     If you have questions or need follow up information about today's clinic visit or your schedule please contact [facility] directly at [phone].  Normal or non-critical lab and imaging results will be communicated to you by Collective IPhart, letter or phone within 4 business days after the clinic has received the results. If you do not hear from us within 7 days, please contact the clinic through Collective IPhart or phone. If you have a critical or abnormal lab result, we will notify you by phone as soon as possible.  Submit refill requests through CytRx or call your pharmacy and they will forward the refill request to us. Please allow 3 business days for your refill to be completed.          Additional Information About Your Visit        Collective IPharInfotrieve Information     CytRx gives you secure access to your electronic health record. If you see a primary care provider, you can also send messages to your care team and make appointments. If you have questions, please call your primary care clinic.  If you do not have a primary care provider, please call [phone] and they will assist you.      CytRx is an electronic gateway that provides easy, online access to your medical records. With CytRx, you can request a clinic appointment, read your test results, renew a prescription or communicate with your care team.     To access your existing account, please contact your [facility] or call [phone] for assistance.        Care EveryWhere ID     This is your Care EveryWhere ID. This could be used by other organizations to access your [facility] medical records  [id number]        Your Vitals Were     Pulse Temperature Respirations Pulse Oximetry BMI (Body Mass Index)       64 98.5  F (36.9  C) (Oral) 16 100% 21.11 kg/m2        Blood Pressure from Last 3 Encounters:   07/19/18 122/80   07/09/18 118/80   06/29/18 123/84    Weight from Last 3 Encounters:   07/19/18 52.3 kg (115 lb 6.4 oz)   07/09/18 52.7 kg (116 lb 1.6 oz)   06/29/18 53.5 kg (118 lb)              Today, you had the following     No orders found  for display       Primary Care Provider Office Phone # Fax #    [facility] [phone] [address] [facility] [address]        Equal Access to Services     MUMTAZ ABURTO : Hadpapito liam ku hadidao Soomaali, waaxda luqadaha, qaybta kaalmada adeammy, edgardo gregorymanisha thomas. So [facility] [phone].    ATENCIÓN: Si habla español, tiene a de jesus disposición servicios gratuitos de asistencia lingüística. Llame al [phone].    We comply with applicable federal civil rights laws and [place] laws. We do not discriminate on the basis of race, color, national origin, age, disability, sex, sexual orientation, or gender identity.            Thank you!     Thank you for choosing [facility]  for your care. Our goal is always to provide you with excellent care. Hearing back from our patients is one way we can continue to improve our services. Please take a few minutes to complete the written survey that you may receive in the mail after your visit with us. Thank you!             Your Updated Medication List - Protect others around you: Learn how to safely use, store and throw away your medicines at www.disposemymeds.org.          This list is accurate as of 7/19/18 11:59 PM.  Always use your most recent med list.                   Brand Name Dispense Instructions for use Diagnosis    citalopram 20 MG tablet    celeXA    90 tablet    Take 1 tablet (20 mg) by mouth daily    Adjustment disorder with anxious mood       Fluocinolone Acetonide Scalp 0.01 % Oil oil      APPLY TO SCALP TWICE A DAY FOR UP TO 3 WEEKS. WAIT 1 WEEK BETWEEN TREATMENTS.        ketoconazole 2 % shampoo    NIZORAL     LATHER IN SCALP FOR 5 MINUTES PRIOR TO RINSING. USE EVERY OTHER DAY FOR 1 WEEK.        meclizine 25 MG tablet    ANTIVERT     Take 25 mg by mouth 3 times daily as needed        * MIRENA (52 MG) 20 MCG/24HR IUD   Generic drug:  levonorgestrel      1 Device by Intrauterine route  Was inserted [facility]  2-        * levonorgestrel 20 MCG/24HR IUD    MIRENA     1 Device by Intrauterine route        nicotine polacrilex 2 MG gum    NICORETTE    150 tablet    Place 1 each (2 mg) inside cheek as needed for smoking cessation    Encounter for smoking cessation counseling       norgestimate-ethinyl estradiol 0.25-35 MG-MCG per tablet    ORTHO-CYCLEN, SPRINTEC    84 tablet    Take active pill daily.    Family history of ovarian cancer, Endometriosis, Oral contraceptive use       ondansetron 4 MG ODT tab    ZOFRAN-ODT     Take 4 mg by mouth every 12 hours        * Notice:  This list has 2 medication(s) that are the same as other medications prescribed for you. Read the directions carefully, and ask your doctor or other care provider to review them with you.

## 2018-07-19 NOTE — LETTER
2018         RE: Celeste Arguello  [address]  [address]  [facility] [address]        Dear Colleague,    Thank you for referring your patient, Celeste Arguello, to the [facility]. Please see a copy of my visit note below.    Consult Notes on Referred Patient        Dr. Miesha Flowers, APRN CNS  [address] [place] [address]       RE: Celeste Arguello  : 1985  KELLY: 2018    Dear Dr. Miesha Flowers:    I had the pleasure of seeing your patient Celeste Arguello here at the Gynecologic Cancer Clinic at the [facility] on 2018.  As you know she is a very pleasant 33 year old woman with a recent diagnosis of BRCA1.  Given these findings she was subsequently sent to the Gynecologic Cancer Clinic for new patient consultation.  She is accompanied today by her good friend.    She underwent genetic testing because her maternal cousin was found to have a mutation and she has a strong family history of breast and ovarian cancer.    2018 - POSITIVE for a BRCA1 mutation. Specifically her mutation is called c.181T>G (p.C61G)    18:  US Pelvis:  The uterus measures 6.9 x 4.7 x 2.8 cm, and there is no evidence of a focal fibroid.  The endometrium is within normal limits and measures 3 mm. There is no free fluid in the pelvis. Intrauterine device in good position.  The right ovary measures 2.2 x 2.7 x 3.2 cm. There is 2.2 x 2.5 x 2.5 cm complex cyst within the right ovary, newly appreciated since . The left ovary is partially obscured by overlying bowel gas but is not enlarged when visualized in the transverse plane, measuring 1.5 x 1.8 cm. There is normal blood flow to the ovaries.         Review of Systems:  Systemic           no weight changes; no fever; no chills; no night sweats; no appetite changes  Skin           no rashes, or lesions  Eye           no irritation; no changes in vision  Esteban-Laryngeal           no  dysphagia; no hoarseness   Pulmonary    no cough; no shortness of breath  Cardiovascular    no chest pain; no palpitations  Gastrointestinal    no diarrhea; no constipation; no abdominal pain; no changes in bowel  habits; no blood in stool  Genitourinary   no urinary frequency; no urinary urgency; no dysuria; no pain; no abnormal vaginal discharge; no abnormal vaginal bleeding  Breast    no breast discharge; no breast changes; no breast pain  Musculoskeletal    no myalgias; no arthralgias; no back pain  Psychiatric           no depressed mood; no anxiety    Hematologic            no tender lymph nodes; no noticeable swellings or lumps   Endocrine    no hot flashes; no heat/cold intolerance         Neurological   no tremor; no numbness and [id number]; no headaches; no difficulty sleeping    Obstetrics and Gynecology History:  ,  x 1  She does not desire future fertility      Past Medical History:  Past Medical History:   Diagnosis Date     BRCA1 gene mutation positive 6/15/2018    BRCA1 c.181T>G (p.C61G) tested at [facility] 2018       Past Surgical History:  Past Surgical History:   Procedure Laterality Date     LAPAROSCOPY DIAGNOSTIC (GYN)      endometriosis     urethral reimplant  age 6       Health Maintenance:  Last Pap Smear: 18              Result: negative, HPV negative  She has not had a history of abnormal Pap smears.    Last Mammogram: 18              Result: normal      She has not had a history of abnormal mammograms.    Last Colonoscopy: N/A      Current Medications:   has a current medication list which includes the following prescription(s): citalopram, fluocinolone acetonide scalp, ketoconazole, levonorgestrel, levonorgestrel, meclizine, nicotine polacrilex, norgestimate-ethinyl estradiol, and ondansetron.     Allergies:   Nkda [no known drug allergies]      Social History:  Social History     Social History     Marital status: Single     Spouse name: N/A     Number of children:  1     Years of education: N/A     Occupational History     College Graduate      [employer] counselor      Social History Main Topics     Smoking status: Current Every Day Smoker     Packs/day: 0.50     Years: 10.00     Types: Cigarettes     Start date: 2005     Smokeless tobacco: Never Used     Alcohol use Yes      Comment: 1wine 1x/yr max     Drug use: No     Sexual activity: Yes     Partners: Male     Birth control/ protection: IUD, Pill, OCP      Comment: Mirena; OCP for cycle control     Other Topics Concern     Not on file     Social History Narrative    How much exercise per week? One    How much calcium per day? Dairy products       How much caffeine per day? 1 cup coffee and 2 can of coke    How much vitamin D per day? None    Do you/your family wear seatbelts?  Yes    Do you/your family use safety helmets? yes    Do you/your family use sunscreen? Yes    Do you/your family keep firearms in the home? No    Do you/your family have a smoke detector(s)? Yes        Do you feel safe in your home? Yes    Has anyone ever touched you in an unwanted manner? No     Explain     SEE GLADYS [facility]     2014    Beronicatyson Dumas [facility] 19               Lives with daughter who is 9, feels safe at home.  Works as substance abuse counselor.  Enjoys writing, photography, reading.  Does not have an advanced directive on file and would like her friend, Olga to be her POA.  Full code    Family History:   The patient's family history is significant for.  Family History   Problem Relation Age of Onset     Ovarian Cancer Mother 38      age 47     Hereditary Breast and Ovarian Cancer Syndrome Maternal Aunt 50     Breast cancer gene -- MEHRDAD/BSO and mastectomy     Pancreatic Cancer Maternal Uncle 38     Alcohol/Drug Brother      active     Alcohol/Drug Brother      recovering     Psychotic Disorder Brother      depression and ADHD     Breast Cancer Maternal Aunt 35     & MC age 36     Uterine Cancer Maternal Aunt       C.A.D. No family hx of      Diabetes No family hx of      Hypertension No family hx of      Coronary Artery Disease No family hx of      Cerebrovascular Disease No family hx of      Hyperlipidemia No family hx of      Colon Cancer No family hx of      Prostate Cancer No family hx of      Thyroid Disease No family hx of          Physical Exam:   /80  Pulse 64  Temp 98.5  F (36.9  C) (Oral)  Resp 16  Wt 52.3 kg (115 lb 6.4 oz)  SpO2 100%  BMI 21.11 kg/m2  Body mass index is 21.11 kg/(m^2).    General Appearance: healthy and alert, no distress     HEENT:  no thyromegaly, no palpable nodules or masses        Cardiovascular: regular rate and rhythm, no gallops, rubs or murmurs     Respiratory: lungs clear, no rales, rhonchi or wheezes, normal diaphragmatic excursion    Musculoskeletal: extremities non tender and without edema    Skin: no lesions or rashes     Neurological: normal gait, no gross defects     Psychiatric: appropriate mood and affect                               Hematological: normal cervical, supraclavicular and inguinal lymph nodes     Gastrointestinal:       abdomen soft, non-tender, non-distended, no organomegaly or masses    Genitourinary: External genitalia and urethral meatus appears normal.  Vagina is smooth without nodularity or masses.  Cervix appears normal and without lesions.  Bimanual exam reveal no masses, nodularity or fullness.  Recto-vaginal exam confirms these findings.      Assessment:    Celeste Arguello is a 33 year old woman with a new diagnosis of BRCA1.     A total of 60 minutes was spent with the patient, >50% of which were spent in counseling the patient and/or treatment planning.      Plan:     1.)    BRCA1 mutation.  We discussed her lifetime risks of both ovarian and breat cancer with this mutation.  We discussed that given this the recommendation is for RRSO at the completion of childbearing given her age.  We discussed that this will dramatically decrease her  risk of developing ovarian cancer but that there does still remain a small risk of developing primary peritoneal cancer.  We also discussed that RRSO has been shown to decrease her risk of breast cancer.  We also discussed that there is emerging data that some endometrial cancers may be linked to BRCA and thus discussed the risks and benefits of prophylactic hysterectomy at the time of her surgery and she would like to proceed with hysterectomy.  We discussed the risks of premature surgical menopause and that menopausal symptoms are different for every woman but there are several treatment options available if she desires/needs it post-operatively.  We discussed the risk of BRCA mutation for her family, especially her children and she is aware.  We discussed the small possibility of an occult cancer at the time of surgery and that if this is found, we would proceed with frozen section and the cancer staging/debulking procedure at that time.  We also discussed the possibility of a microscopic/STIC tumor discovered on final pathology and that in this case she would require a second staging procedure.  Risks, benefits, indications and alternatives were discussed with the patient.   She would like to defer her surgery until September and thus she will call when she has a surgical date she would like.    2.) Surgical menopause.  We discussed the options for treatment including hormonal options.  We discussed this in light of her increased risk of breat cancer with the BRCA mutation as well as her smoking status.  She is wanting to quit soon and has nicorrete gum which has helped her quit in the past.      3.) Genetic risk factors were assessed and the patient has a known BRCA1 mutation.      4.) Labs and/or tests ordered include:  none.     5.) Health maintenance issues addressed today include pt is up to date.           Thank you for allowing us to participate in the care of your patient.         Sincerely,    Marybeth  MD Bhupendra  Gynecologic Oncology  [facility]       CC  KIKI MAXWELL

## 2018-07-19 NOTE — NURSING NOTE
"Oncology Rooming Note    July 19, 2018 12:52 PM   Celeste Arguello is a 33 year old female who presents for:    Chief Complaint   Patient presents with     Oncology Clinic Visit     new     Initial Vitals: /80  Pulse 64  Temp 98.5  F (36.9  C) (Oral)  Resp 16  Wt 52.3 kg (115 lb 6.4 oz)  SpO2 100%  BMI 21.11 kg/m2 Estimated body mass index is 21.11 kg/(m^2) as calculated from the following:    Height as of 7/9/18: 1.575 m (5' 2\").    Weight as of this encounter: 52.3 kg (115 lb 6.4 oz). Body surface area is 1.51 meters squared.  Mild Pain (3) Comment: ibuprofen for pain management   No LMP recorded. Patient is not currently having periods (Reason: IUD).  Allergies reviewed: Yes  Medications reviewed: Yes    Medications: Medication refills not needed today.  Pharmacy name entered into [facility]el:    [facility] [place], [place] - [address] [id number] - [place], [place] - [address] [facility] [address] [place] [address]      5 minutes for nursing intake (face to face time)     Aydee Park RN              "

## 2018-07-30 ENCOUNTER — CARE COORDINATION (OUTPATIENT)
Dept: ONCOLOGY | Facility: CLINIC | Age: 33
End: 2018-07-30

## 2018-07-30 NOTE — PROGRESS NOTES
Received voicemail message from patient 7/27/18 that she would like to proceed with scheduling surgery with Dr. Huizar in September sometime.  Reviewed with Dr. Huizar, who would like to see patient back in our [place] clinic on 8/24 for surgery planning/consenting and pre-operative teaching.  Call placed to patient to discuss, patient did not answer.  Voicemail message was left on patient's cell phone, asking her to return telephone call to schedule 8/24 pre-op visit, and to discuss if she has any certain dates in mind for her surgery in September.  Direct phone number for this RN was provided in message.    Jose L Bower, RN, BSN, OCN  Oncology Care Coordinator  [facility]

## 2018-07-31 ENCOUNTER — OFFICE VISIT (OUTPATIENT)
Dept: PLASTIC SURGERY | Facility: CLINIC | Age: 33
End: 2018-07-31
Attending: PLASTIC SURGERY
Payer: COMMERCIAL

## 2018-07-31 ENCOUNTER — CARE COORDINATION (OUTPATIENT)
Dept: ONCOLOGY | Facility: CLINIC | Age: 33
End: 2018-07-31

## 2018-07-31 VITALS
RESPIRATION RATE: 16 BRPM | DIASTOLIC BLOOD PRESSURE: 77 MMHG | WEIGHT: 115.56 LBS | TEMPERATURE: 98.2 F | OXYGEN SATURATION: 97 % | HEIGHT: 62 IN | BODY MASS INDEX: 21.27 KG/M2 | SYSTOLIC BLOOD PRESSURE: 114 MMHG | HEART RATE: 72 BPM

## 2018-07-31 DIAGNOSIS — Z15.09 BRCA1 GENE MUTATION POSITIVE: Primary | ICD-10-CM

## 2018-07-31 DIAGNOSIS — Z15.01 BRCA1 GENE MUTATION POSITIVE: Primary | ICD-10-CM

## 2018-07-31 RX ORDER — CEFAZOLIN SODIUM 1 G/50ML
1 INJECTION, SOLUTION INTRAVENOUS SEE ADMIN INSTRUCTIONS
Status: CANCELLED | OUTPATIENT
Start: 2018-07-31 | End: 2019-07-31

## 2018-07-31 RX ORDER — ACETAMINOPHEN 325 MG/1
975 TABLET ORAL ONCE
Status: CANCELLED | OUTPATIENT
Start: 2018-07-31 | End: 2018-07-31

## 2018-07-31 ASSESSMENT — PAIN SCALES - GENERAL: PAINLEVEL: NO PAIN (0)

## 2018-07-31 NOTE — PROGRESS NOTES
Patient left voicemail message in clinic looking to schedule her surgery with Dr. Huizar.  RN attempted to return patient's call, patient did not answer.  Voicemail message was left on cell phone with callback information.    8/1/18 9:28 am - patient returned telephone call, stating that she would like to schedule her surgery with Dr. Huizar on 9/12/18 if possible.  Dr. Huizar was notified, will get back to patient to confirm surgery date/time once scheduled.  Patient is in agreement with seeing Dr. Huizar at our [facility] on 8/24 for surgery consenting and pre-op teaching, appointment scheduled, patient is aware of appointment details.    Jose L Bower, RN, BSN, OCN  Oncology Care Coordinator  [facility]

## 2018-07-31 NOTE — PROGRESS NOTES
REFERRING PROVIDER:  Dr. Sheridan Dudley, [facility]       PRESENTING COMPLAINT:  Consultation for bilateral breast reconstruction.      HISTORY OF PRESENTING COMPLAINT:  Ms. Arguello is 33 years old.  She has been diagnosed with BRCA1 gene mutation.  She has been counseled for bilateral mastectomy and is interested in immediate reconstruction.  She lives an active lifestyle, has no back pain.  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  She would like to be either a B or a C cup.      PAST MEDICAL HISTORY:  Nil.      PAST SURGICAL HISTORY:  Lap diagnostic procedure for endometriosis.      MEDICATIONS:     1.  Celexa.    2.  Birth control pill.      ALLERGIES:  Nil.      SOCIAL HISTORY:  Smokes a pack a day for the last 5 years.  Drinks socially.  Lives in [place], is a counselor.      REVIEW OF SYSTEMS:  Denies chest pain, shortness of breath, MI, CVA, DVT and PE.      PHYSICAL EXAMINATION:     VITALS SIGNS:  Stable.  She is afebrile, in no obvious distress.  She is 5 feet 2 inches, 116 pounds, BMI 21 kg/m2.     CHEST:  On examination of her breasts, she has relatively symmetric breasts, grade 0 ptosis.  Sternal mrpqu-ol-ljsrzd distance under 25 cm.     ABDOMEN:  She has no scars and no hernias.  Skin pinch thickness is about 2 cm.  No scars in the back.      ASSESSMENT AND PLAN:  Based on above findings, a diagnosis of BRCA1 gene mutation being worked up for bilateral mastectomy and an interest in immediate reconstruction was made.  I had a long discussion with the patient about her findings.  In my opinion, the best aesthetic results she will get for her breast reconstruction would be a lateral mammary fold incision with nipple-sparing mastectomy and immediate reconstruction with either a direct to implant or an expander-based reconstruction.  My biggest concern is her active smoking.  She needs to be off all nicotine products and in my opinion, given the fact this is a BRCA1 gene mutation, we should wait for  about 3-6 months and then proceed with the above plan.  The patient is very interested in proceeding with this plan.  I will discuss her case with Dr. Dudley to get her approval and we will plan accordingly for some time in end of this year or early next year.  I was very clear about the implication smoking has in terms of complications and the plan we would do for breast reconstruction.  All risks, benefits and alternatives of potential procedures including infection, bleeding, scarring, asymmetry, seromas, hematomas, wound breakdown, wound dehiscence, implant problems like implant visibility, palpability, rippling, capsular contracture, requirement of further staged procedures in the future, requirement of implant exchanges in the future, numbness of the breasts, loss of skin, loss of nipples, asymmetries, DVT, PE, MI, CVA, pneumonia, renal failure and death were explained.  I was very clear that implants do fail and need to be replaced in the future.  Capsular contracture can occur.  We did talk about autologous reconstruction but I do not think she is an ideal candidate for it, given her BMI.  She understood and agreed.  I had a risa conversation about other options including nipple non-sparing mastectomies as well as lateral radial incisions.  Each of those has a poor aesthetic outcome.  All questions were answered.  She was happy visit.  All exam and discussion done in the presence of my nurse.  Consent obtained.  Photographs obtained.  Look forward to helping her out in the near future think.        Total time spent with patient was 30 minutes, more than half counseling.      cc:   Sheridan Dudley MD    [address], [address] [address]

## 2018-07-31 NOTE — NURSING NOTE
"Oncology Rooming Note    July 31, 2018 9:57 AM   Celeste Arguello is a 33 year old female who presents for:    Chief Complaint   Patient presents with     Oncology Clinic Visit     New Pt. Consult for Prophylactic Mastectomy     Initial Vitals: /77  Pulse 72  Temp 98.2  F (36.8  C) (Tympanic)  Resp 16  Ht 1.575 m (5' 2\")  Wt 52.4 kg (115 lb 9 oz)  SpO2 97%  Breastfeeding? No  BMI 21.14 kg/m2 Estimated body mass index is 21.14 kg/(m^2) as calculated from the following:    Height as of this encounter: 1.575 m (5' 2\").    Weight as of this encounter: 52.4 kg (115 lb 9 oz). Body surface area is 1.51 meters squared.  No Pain (0) Comment: Data Unavailable   No LMP recorded. Patient is not currently having periods (Reason: IUD).  Allergies reviewed: Yes  Medications reviewed: Yes    Medications: Medication refills not needed today.  Pharmacy name entered into [facility]:    [facility] [address] [place] [address] & [id number] [place], [place] - [address] [facility] [address] [place] [facility] [address] [place] [address]    Clinical concerns: new patient here today for a consult for prophylactic mastectomy Dr. Soliman was notified.    10 minutes for nursing intake (face to face time)     Amilcar Worthington CMA              "

## 2018-07-31 NOTE — MR AVS SNAPSHOT
After Visit Summary   7/31/2018    Celeste Arguello    MRN: [id number]           Patient Information     Date Of Birth          1985        Visit Information        Provider Department      7/31/2018 9:45 AM FERN Soliman MD [facility]         Follow-ups after your visit        Your next 10 appointments already scheduled     Aug 27, 2018  4:45 PM CDT   US PELVIC COMPLETE W TRANSVAGINAL with [id number]   [facility] US ([facility])    [address] [place] [address] [phone]           Please bring a list of your medicines (including vitamins, minerals and over-the-counter drugs). Also, tell your doctor about any allergies you may have. Wear comfortable clothes and leave your valuables at home.  Adults: Drink four 8-ounce glasses of fluid an hour before your exam. If you need to empty your bladder before your exam, try to release only a little urine. Then, drink another glass of fluid.  Children: Children who are potty trained up to 6 years old should drink at least 2 cups (16 oz) of water/non-carbonated beverage 30 minutes prior to the exam. Children who are 6-10 years should drink at least 3 cups (24 oz) of water/non-carbonated beverage 45 minutes prior to the exam. Children who are 10 years or older should drink at least 4 cups (32 oz) of water/non-carbonated beverage 45 minutes prior to the exam. If your child is very uncomfortable or has an urgent need to pee, please notify a technologist; they will try to find out how much longer the wait may be and provide instructions to help relieve the pressure.  Please call the Imaging Department at your exam site with any questions.            Aug 27, 2018  6:00 PM CDT   (Arrive by 5:45 PM)   New Patient Visit with Fracisco Spencer MD   [facility] ([facility])    [address] [place] [address]    [phone]            Sep 11, 2018  9:45 AM CDT   (Arrive by 9:30 AM)   Return Visit with FERN Soliman MD   [facility] ([facility])    [address]  [address]  [facility] [address] [phone]            Dec 19, 2018  1:00 PM CST   MR BREAST BILATERAL W/O & W CONTRAST with [id number]   [facility] [place] [facility] ([facility])    [address]  1st Floor  [facility] [address] [phone]           Take your medicines as usual, unless your doctor tells you not to. Bring a list of your current medicines to your exam (including vitamins, minerals and over-the-counter drugs).  The timing of your exam may depend on the start of your last period. If you re in menopause, you may have the exam anytime.  Please bring any previous mammograms or breast MRIs from other facilities to the MRI dept. Do not mail these items to us.   You will have IV contrast for this exam.  You do not need to do anything special to prepare.  The MRI machine uses a strong magnet. Please wear clothes without metal (snaps, zippers). A sweatsuit works well, or we may give you a hospital gown.  Please remove any body piercings and hair extensions before you arrive. You will also remove watches, jewelry, hairpins, wallets, dentures, partial dental plates and hearing aids. You may wear contact lenses, and you may be able to wear your rings. We have a safe place to keep your personal items, but it is safer to leave them at home.  **IMPORTANT** THE INSTRUCTIONS BELOW ARE ONLY FOR THOSE PATIENTS WHO HAVE BEEN PRESCRIBED SEDATION OR GENERAL ANESTHESIA DURING THEIR MRI PROCEDURE:  IF YOUR DOCTOR PRESCRIBED ORAL SEDATION (take medicine to help you relax during your exam):   You must get the medicine from your doctor (oral medication) before you arrive. Bring the medicine to the exam. Do not take it at home. You ll be told when to take it upon arriving for your  exam.   Arrive one hour early. Bring someone who can take you home after the test. Your medicine will make you sleepy. After the exam, you may not drive, take a bus or take a taxi by yourself.  IF YOUR DOCTOR PRESCRIBED IV SEDATION:   Arrive one hour early. Bring someone who can take you home after the test. Your medicine will make you sleepy. After the exam, you may not drive, take a bus or take a taxi by yourself.   No eating 6 hours before your exam. You may have clear liquids up until 4 hours before your exam. (Clear liquids include water, clear tea, black coffee and fruit juice without pulp.)  IF YOUR DOCTOR PRESCRIBED ANESTHESIA (be asleep for your exam):   Arrive 1 1/2 hours early. Bring someone who can take you home after the test. You may not drive, take a bus or take a taxi by yourself.   No eating 8 hours before your exam. You may have clear liquids up until 4 hours before your exam. (Clear liquids include water, clear tea, black coffee and fruit juice without pulp.)   You will spend four to five hours in the recovery room.  If you have any questions, please contact your Imaging Department exam site.            Jun 21, 2019  1:00 PM CDT   [facility] Lab Draw with  [employer] LAB DRAW   [facility] Lab Draw ([facility])    [address] [place] [address]  [address] [place] [address] [phone]            Jun 21, 2019  1:30 PM CDT   (Arrive by 1:15 PM)   Return Visit with ENRIQUE Cat   [facility] ([facility])    [address] [place] [address]  [address]  [facility] [address] [phone]              Who to contact     If you have questions or need follow up information about today's clinic visit or your schedule please contact [facility] directly at [phone].  Normal or non-critical lab and imaging results will be communicated to you by MyChart, letter or phone within 4 business days after the clinic  "has received the results. If you do not hear from us within 7 days, please contact the clinic through [employer] or phone. If you have a critical or abnormal lab result, we will notify you by phone as soon as possible.  Submit refill requests through [employer] or call your pharmacy and they will forward the refill request to us. Please allow 3 business days for your refill to be completed.          Additional Information About Your Visit        [employer] Information     [employer] gives you secure access to your electronic health record. If you see a primary care provider, you can also send messages to your care team and make appointments. If you have questions, please call your primary care clinic.  If you do not have a primary care provider, please call [phone] and they will assist you.        Care EveryWhere ID     This is your Care EveryWhere ID. This could be used by other organizations to access your [place] medical records  [id number]        Your Vitals Were     Pulse Temperature Respirations Height Pulse Oximetry Breastfeeding?    72 98.2  F (36.8  C) (Tympanic) 16 1.575 m (5' 2\") 97% No    BMI (Body Mass Index)                   21.14 kg/m2            Blood Pressure from Last 3 Encounters:   07/31/18 114/77   07/19/18 122/80   07/09/18 118/80    Weight from Last 3 Encounters:   07/31/18 52.4 kg (115 lb 9 oz)   07/19/18 52.3 kg (115 lb 6.4 oz)   07/09/18 52.7 kg (116 lb 1.6 oz)              Today, you had the following     No orders found for display         Today's Medication Changes          These changes are accurate as of 7/31/18 10:43 AM.  If you have any questions, ask your nurse or doctor.               These medicines have changed or have updated prescriptions.        Dose/Directions    MIRENA (52 MG) 20 MCG/24HR IUD   This may have changed:  Another medication with the same name was removed. Continue taking this medication, and follow the directions you see here.   Generic drug:  levonorgestrel "   Changed by:  FERN Soliman MD        Dose:  1 Device   1 Device by Intrauterine route Was inserted [facility]  2-   Refills:  0                Primary Care Provider Office Phone # Fax #    Shanthi [facility] [phone]       [address]  [place] [address]        Equal Access to Services     [facility]: Hadii aad ku hadasho Soomaali, waaxda luqadaha, qaybta kaalmada adeegyada, waxay idiin hayaan adeeg khvelmash lakathyn . So [facility] [phone].    ATENCIÓN: Si habla español, tiene a de jesus disposición servicios gratuitos de asistencia lingüística. Pilarame al [phone].    We comply with applicable federal civil rights laws and [place] laws. We do not discriminate on the basis of race, color, national origin, age, disability, sex, sexual orientation, or gender identity.            Thank you!     Thank you for choosing [facility]  for your care. Our goal is always to provide you with excellent care. Hearing back from our patients is one way we can continue to improve our services. Please take a few minutes to complete the written survey that you may receive in the mail after your visit with us. Thank you!             Your Updated Medication List - Protect others around you: Learn how to safely use, store and throw away your medicines at www.disposemymeds.org.          This list is accurate as of 7/31/18 10:43 AM.  Always use your most recent med list.                   Brand Name Dispense Instructions for use Diagnosis    citalopram 20 MG tablet    celeXA    90 tablet    Take 1 tablet (20 mg) by mouth daily    Adjustment disorder with anxious mood       Fluocinolone Acetonide Scalp 0.01 % Oil oil      APPLY TO SCALP TWICE A DAY FOR UP TO 3 WEEKS. WAIT 1 WEEK BETWEEN TREATMENTS.        ketoconazole 2 % shampoo    NIZORAL     LATHER IN SCALP FOR 5 MINUTES PRIOR TO RINSING. USE EVERY OTHER DAY FOR 1 WEEK.        meclizine 25 MG tablet    ANTIVERT     Take 25 mg by mouth 3  times daily as needed        MIRENA (52 MG) 20 MCG/24HR IUD   Generic drug:  levonorgestrel      1 Device by Intrauterine route Was inserted [facility]  2-        nicotine polacrilex 2 MG gum    NICORETTE    150 tablet    Place 1 each (2 mg) inside cheek as needed for smoking cessation    Encounter for smoking cessation counseling       norgestimate-ethinyl estradiol 0.25-35 MG-MCG per tablet    ORTHO-CYCLEN, SPRINTEC    84 tablet    Take active pill daily.    Family history of ovarian cancer, Endometriosis, Oral contraceptive use       ondansetron 4 MG ODT tab    ZOFRAN-ODT     Take 4 mg by mouth every 12 hours

## 2018-07-31 NOTE — LETTER
7/31/2018       RE: Celeste Arguello  [address]  [address]  [facility] [address]     Dear Colleague,    Thank you for referring your patient, Celeste Arguello, to the [facility] at [facility]. Please see a copy of my visit note below.    REFERRING PROVIDER:  Dr. Sheridan Dudley, [facility]       PRESENTING COMPLAINT:  Consultation for bilateral breast reconstruction.      HISTORY OF PRESENTING COMPLAINT:  Ms. Arguello is 33 years old.  She has been diagnosed with BRCA1 gene mutation.  She has been counseled for bilateral mastectomy and is interested in immediate reconstruction.  She lives an active lifestyle, has no back pain.  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  She would like to be either a B or a C cup.      PAST MEDICAL HISTORY:  Nil.      PAST SURGICAL HISTORY:  Lap diagnostic procedure for endometriosis.      MEDICATIONS:     1.  Celexa.    2.  Birth control pill.      ALLERGIES:  Nil.      SOCIAL HISTORY:  Smokes a pack a day for the last 5 years.  Drinks socially.  Lives in [place], is a counselor.      REVIEW OF SYSTEMS:  Denies chest pain, shortness of breath, MI, CVA, DVT and PE.      PHYSICAL EXAMINATION:     VITALS SIGNS:  Stable.  She is afebrile, in no obvious distress.  She is 5 feet 2 inches, 116 pounds, BMI 21 kg/m2.     CHEST:  On examination of her breasts, she has relatively symmetric breasts, grade 0 ptosis.  Sternal yumfo-mf-wtnmph distance under 25 cm.     ABDOMEN:  She has no scars and no hernias.  Skin pinch thickness is about 2 cm.  No scars in the back.      ASSESSMENT AND PLAN:  Based on above findings, a diagnosis of BRCA1 gene mutation being worked up for bilateral mastectomy and an interest in immediate reconstruction was made.  I had a long discussion with the patient about her findings.  In my opinion, the best aesthetic results she will get for her breast reconstruction would be a lateral mammary fold incision  with nipple-sparing mastectomy and immediate reconstruction with either a direct to implant or an expander-based reconstruction.  My biggest concern is her active smoking.  She needs to be off all nicotine products and in my opinion, given the fact this is a BRCA1 gene mutation, we should wait for about 3-6 months and then proceed with the above plan.  The patient is very interested in proceeding with this plan.  I will discuss her case with Dr. Dudley to get her approval and we will plan accordingly for some time in end of this year or early next year.  I was very clear about the implication smoking has in terms of complications and the plan we would do for breast reconstruction.  All risks, benefits and alternatives of potential procedures including infection, bleeding, scarring, asymmetry, seromas, hematomas, wound breakdown, wound dehiscence, implant problems like implant visibility, palpability, rippling, capsular contracture, requirement of further staged procedures in the future, requirement of implant exchanges in the future, numbness of the breasts, loss of skin, loss of nipples, asymmetries, DVT, PE, MI, CVA, pneumonia, renal failure and death were explained.  I was very clear that implants do fail and need to be replaced in the future.  Capsular contracture can occur.  We did talk about autologous reconstruction but I do not think she is an ideal candidate for it, given her BMI.  She understood and agreed.  I had a risa conversation about other options including nipple non-sparing mastectomies as well as lateral radial incisions.  Each of those has a poor aesthetic outcome.  All questions were answered.  She was happy visit.  All exam and discussion done in the presence of my nurse.  Consent obtained.  Photographs obtained.  Look forward to helping her out in the near future think.        Total time spent with patient was 30 minutes, more than half counseling.       Sincerely,    M Jessica Soliman MD    cc:    Sheridan Dudley MD    [address], [facility] [address]   [place] [address]

## 2018-08-01 DIAGNOSIS — Z15.09 BRCA POSITIVE: Primary | ICD-10-CM

## 2018-08-01 DIAGNOSIS — Z15.01 BRCA POSITIVE: Primary | ICD-10-CM

## 2018-08-01 RX ORDER — CEFAZOLIN SODIUM 1 G/50ML
1 INJECTION, SOLUTION INTRAVENOUS SEE ADMIN INSTRUCTIONS
Status: CANCELLED | OUTPATIENT
Start: 2018-08-01 | End: 2019-08-01

## 2018-08-01 RX ORDER — HEPARIN SODIUM 5000 [USP'U]/.5ML
5000 INJECTION, SOLUTION INTRAVENOUS; SUBCUTANEOUS
Status: CANCELLED | OUTPATIENT
Start: 2018-08-01 | End: 2038-08-02

## 2018-08-01 RX ORDER — PHENAZOPYRIDINE HYDROCHLORIDE 200 MG/1
200 TABLET, FILM COATED ORAL ONCE
Status: CANCELLED | OUTPATIENT
Start: 2018-08-01 | End: 2018-08-01

## 2018-08-03 ENCOUNTER — CARE COORDINATION (OUTPATIENT)
Dept: ONCOLOGY | Facility: CLINIC | Age: 33
End: 2018-08-03

## 2018-08-03 ENCOUNTER — TELEPHONE (OUTPATIENT)
Dept: ONCOLOGY | Facility: CLINIC | Age: 33
End: 2018-08-03

## 2018-08-03 NOTE — PROGRESS NOTES
Voicemail message was left for patient with scheduled surgery date/time of 9/12/18 at 7:30 am with Dr. Huizar.  Provided location and address details.  Will provide patient with further details at her upcoming appointment with Dr. Huizar on 8/24.  Encouraged patient to call with any questions.    Jose L Bower RN, BSN, OCN  Oncology Care Coordinator  [facility]

## 2018-08-21 ENCOUNTER — TELEPHONE (OUTPATIENT)
Dept: DERMATOLOGY | Facility: CLINIC | Age: 33
End: 2018-08-21

## 2018-08-21 NOTE — TELEPHONE ENCOUNTER
Left message for Celeste reminding of appointment date and time. Asked that they bring an updated list of medications and any records pertaining to this visit.     Clinic number provided to call back in case this appointment needs to be canceled.    Sandrine Moncada, EMT

## 2018-08-23 NOTE — PROGRESS NOTES
Consult Notes on Referred Patient        Dr. Miesha Flowers, APRN CNS  [address] [place] [address]       RE: Celeste Arguello  : 1985  KELLY: 2018    HPI:  Celeste Arguello is 33 year old with BRCA1 mutation.  She is accompanied today by her boyfriend.  She is here for pre-operative examination.  She has no concerns today. She is ready to proceed with her prophylactic surgery    Clinical Course:    She underwent genetic testing because her maternal cousin was found to have a mutation and she has a strong family history of breast and ovarian cancer.    2018 - POSITIVE for a BRCA1 mutation. Specifically her mutation is called c.181T>G (p.C61G)    18:  US Pelvis:  The uterus measures 6.9 x 4.7 x 2.8 cm, and there is no evidence of a focal fibroid.  The endometrium is within normal limits and measures 3 mm. There is no free fluid in the pelvis. Intrauterine device in good position.  The right ovary measures 2.2 x 2.7 x 3.2 cm. There is 2.2 x 2.5 x 2.5 cm complex cyst within the right ovary, newly appreciated since . The left ovary is partially obscured by overlying bowel gas but is not enlarged when visualized in the transverse plane, measuring 1.5 x 1.8 cm. There is normal blood flow to the ovaries.         Review of Systems:  Systemic           no weight changes; no fever; no chills; no night sweats; no appetite changes  Skin           no rashes, or lesions  Eye           no irritation; no changes in vision  Esteban-Laryngeal           no dysphagia; no hoarseness   Pulmonary    no cough; no shortness of breath  Cardiovascular    no chest pain; no palpitations  Gastrointestinal    no diarrhea; no constipation; + abdominal pain; no changes in bowel  habits; no blood in stool  Genitourinary   no urinary frequency; no urinary urgency; no dysuria; no pain; no abnormal vaginal discharge; no abnormal vaginal bleeding; + dyspareunia  Breast    no breast discharge; no breast  changes; no breast pain  Musculoskeletal    no myalgias; no arthralgias; no back pain  Psychiatric           no depressed mood; + anxiety; + nervousness    Hematologic            no tender lymph nodes; no noticeable swellings or lumps   Endocrine    no hot flashes; no heat/cold intolerance         Neurological   no tremor; no numbness and [id number]; no headaches; no difficulty sleeping    Obstetrics and Gynecology History:  ,  x 1  She does not desire future fertility      Past Medical History:  Past Medical History:   Diagnosis Date     BRCA1 gene mutation positive 6/15/2018    BRCA1 c.181T>G (p.C61G) tested at [facility] 2018       Past Surgical History:  Past Surgical History:   Procedure Laterality Date     LAPAROSCOPY DIAGNOSTIC (GYN)      endometriosis     urethral reimplant  age 6       Health Maintenance:  Last Pap Smear: 18              Result: negative, HPV negative  She has not had a history of abnormal Pap smears.    Last Mammogram: 18              Result: normal      She has not had a history of abnormal mammograms.    Last Colonoscopy: N/A      Current Medications:   has a current medication list which includes the following prescription(s): citalopram, levonorgestrel, nicotine polacrilex, norgestimate-ethinyl estradiol, ondansetron, fluocinolone acetonide scalp, ketoconazole, and meclizine.     Allergies:   Nkda [no known drug allergies]      Social History:  Social History     Social History     Marital status: Single     Spouse name: N/A     Number of children: 1     Years of education: N/A     Occupational History     College Graduate      [employer] counselor      Social History Main Topics     Smoking status: Current Every Day Smoker     Packs/day: 0.50     Years: 10.00     Types: Cigarettes     Start date: 2005     Smokeless tobacco: Never Used     Alcohol use Yes      Comment: 1wine 1x/yr max     Drug use: No     Sexual activity: Yes     Partners: Male     Birth  control/ protection: IUD, Pill, OCP      Comment: Mirena; OCP for cycle control     Other Topics Concern     Not on file     Social History Narrative    How much exercise per week? One    How much calcium per day? Dairy products       How much caffeine per day? 1 cup coffee and 2 can of coke    How much vitamin D per day? None    Do you/your family wear seatbelts?  Yes    Do you/your family use safety helmets? yes    Do you/your family use sunscreen? Yes    Do you/your family keep firearms in the home? No    Do you/your family have a smoke detector(s)? Yes        Do you feel safe in your home? Yes    Has anyone ever touched you in an unwanted manner? No     Explain     SEE GLADYS [facility]     2014    Beronica Dumas [facility] 19               Lives with daughter who is 9, feels safe at home.  Works as substance abuse counselor.  Enjoys writing, photography, reading.  Does not have an advanced directive on file and would like her friend, Olga to be her POA.  Full code    Family History:   The patient's family history is significant for.  Family History   Problem Relation Age of Onset     Ovarian Cancer Mother 38      age 47     Hereditary Breast and Ovarian Cancer Syndrome Maternal Aunt 50     Breast cancer gene -- MEHRDAD/BSO and mastectomy     Pancreatic Cancer Maternal Uncle 38     Alcohol/Drug Brother      active     Alcohol/Drug Brother      recovering     Psychotic Disorder Brother      depression and ADHD     Breast Cancer Maternal Aunt 35     & MC age 36     Uterine Cancer Maternal Aunt      C.A.D. No family hx of      Diabetes No family hx of      Hypertension No family hx of      Coronary Artery Disease No family hx of      Cerebrovascular Disease No family hx of      Hyperlipidemia No family hx of      Colon Cancer No family hx of      Prostate Cancer No family hx of      Thyroid Disease No family hx of          Physical Exam:   /75  Pulse 69  Temp 98.3  F (36.8  C)  Resp 20  Ht 1.575 m (5'  "2\")  Wt 52.2 kg (115 lb)  SpO2 98%  BMI 21.03 kg/m2  Body mass index is 21.03 kg/(m^2).    General Appearance: healthy and alert, no distress     HEENT:  no thyromegaly, no palpable nodules or masses        Cardiovascular: regular rate and rhythm, no gallops, rubs or murmurs     Respiratory: lungs clear, no rales, rhonchi or wheezes, normal diaphragmatic excursion    Musculoskeletal: extremities non tender and without edema    Skin: no lesions or rashes     Neurological: normal gait, no gross defects     Psychiatric: appropriate mood and affect                               Hematological: normal cervical, supraclavicular and inguinal lymph nodes     Gastrointestinal:       abdomen soft, non-tender, non-distended, no organomegaly or masses    Genitourinary: Deferred      Assessment:    Celeste Arguello is a 33 year old woman with a diagnosis of BRCA1.     A total of 30 minutes was spent with the patient, >50% of which were spent in counseling the patient and/or treatment planning.      Plan:     1.)    BRCA1 mutation.  We discussed her lifetime risks of both ovarian and breat cancer with this mutation.  We discussed that given this the recommendation is for RRSO at the completion of childbearing given her age and family history of early onset ovarian cancer.  We discussed that this will dramatically decrease her risk of developing ovarian cancer but that there does still remain a small risk of developing primary peritoneal cancer.  We also discussed that RRSO has been shown to decrease her risk of breast cancer.  We also discussed that there is emerging data that some endometrial cancers may be linked to BRCA and thus discussed the risks and benefits of prophylactic hysterectomy at the time of her surgery and she would like to proceed with hysterectomy.  We discussed the risks of premature surgical menopause and that menopausal symptoms are different for every woman but there are several treatment options " available if she desires/needs it post-operatively.  We discussed the risk of BRCA mutation for her family, especially her children and she is aware.  We discussed the small possibility of an occult cancer at the time of surgery and that if this is found, we would proceed with frozen section and the cancer staging/debulking procedure at that time.  We also discussed the possibility of a microscopic/STIC tumor discovered on final pathology and that in this case she would require a second staging procedure.  Risks, benefits, indications and alternatives were discussed with the patient. All her questions were answered and consents were signed for laparoscopic removal of uterus, cervix, both ovaries and fallopian tubes, possible open, cystoscopy on 9/12    2.) Surgical menopause.  We discussed the options for treatment including hormonal options.  We discussed this in light of her increased risk of breat cancer with the BRCA mutation as well as her smoking status.  She is wanting to quit soon and has nicorrete gum which has helped her quit in the past.      3.) Genetic risk factors were assessed and the patient has a known BRCA1 mutation.      4.) Labs and/or tests ordered include:  CBC, CMP, T/S     5.) Health maintenance issues addressed today include pt is up to date.    6.) Pre-operative teaching was completed today           Thank you for allowing us to participate in the care of your patient.         Sincerely,    Marybeth Huizar MD  Gynecologic Oncology  [facility]       KIKI ACOSTA

## 2018-08-23 NOTE — PATIENT INSTRUCTIONS
You have been scheduled for surgery on: 9/12/18    Diagnosis:  BRCA mutation    The surgical procedure is: Laparoscopic removal of uterus, cervix, both ovaries and fallopian tubes, possible open, cystoscopy    Anticipated length of surgery: 1-2 hrs.  You will be in the recovery room for approximately 2-3 hrs after surgery.  Your family will not be able to see you until after you leave the recovery room.    Length of hospital stay:  This is an outpatient, extended stay surgery.  You will be discharged same day if you meet criteria for discharge.  If you have a larger surgical incision, you will need to be in the hospital 2-3 days.  ______________________________________________________________________    Preparation for Surgery:    To Schedule   1.  Labs:  CBC, CMP, T/S, orders placed, please perform today   2.  Post-operative exam with me 1-2 weeks following surgery      Postoperative Restrictions:  No heavy lifting >20lbs for six weeks, nothing in the vagina (no tampons, no intercourse, no douching) for eight weeks.     Postoperative visit:  Return to clinic 1-2 weeks after surgery for post operative visit.      Please contact the clinic with any questions or concerns.    Marybeth Huizar MD  Gynecologic Oncology  [facility]

## 2018-08-24 ENCOUNTER — ONCOLOGY VISIT (OUTPATIENT)
Dept: ONCOLOGY | Facility: CLINIC | Age: 33
End: 2018-08-24
Payer: COMMERCIAL

## 2018-08-24 VITALS
HEART RATE: 69 BPM | SYSTOLIC BLOOD PRESSURE: 115 MMHG | RESPIRATION RATE: 20 BRPM | WEIGHT: 115 LBS | BODY MASS INDEX: 21.16 KG/M2 | DIASTOLIC BLOOD PRESSURE: 75 MMHG | HEIGHT: 62 IN | OXYGEN SATURATION: 98 % | TEMPERATURE: 98.3 F

## 2018-08-24 DIAGNOSIS — Z15.01 BRCA POSITIVE: ICD-10-CM

## 2018-08-24 DIAGNOSIS — Z15.09 BRCA POSITIVE: ICD-10-CM

## 2018-08-24 LAB
ABO + RH BLD: NORMAL
ABO + RH BLD: NORMAL
ALBUMIN SERPL-MCNC: 3.6 G/DL (ref 3.4–5)
ALP SERPL-CCNC: 45 U/L (ref 40–150)
ALT SERPL W P-5'-P-CCNC: 13 U/L (ref 0–50)
ANION GAP SERPL CALCULATED.3IONS-SCNC: 6 MMOL/L (ref 3–14)
AST SERPL W P-5'-P-CCNC: 13 U/L (ref 0–45)
BASOPHILS # BLD AUTO: 0.1 10E9/L (ref 0–0.2)
BASOPHILS NFR BLD AUTO: 0.6 %
BILIRUB SERPL-MCNC: 0.6 MG/DL (ref 0.2–1.3)
BLD GP AB SCN SERPL QL: NORMAL
BLOOD BANK CMNT PATIENT-IMP: NORMAL
BUN SERPL-MCNC: 7 MG/DL (ref 7–30)
CALCIUM SERPL-MCNC: 8.5 MG/DL (ref 8.5–10.1)
CHLORIDE SERPL-SCNC: 108 MMOL/L (ref 94–109)
CO2 SERPL-SCNC: 29 MMOL/L (ref 20–32)
CREAT SERPL-MCNC: 0.78 MG/DL (ref 0.52–1.04)
DIFFERENTIAL METHOD BLD: NORMAL
EOSINOPHIL # BLD AUTO: 0.1 10E9/L (ref 0–0.7)
EOSINOPHIL NFR BLD AUTO: 1.6 %
ERYTHROCYTE [DISTWIDTH] IN BLOOD BY AUTOMATED COUNT: 12.6 % (ref 10–15)
GFR SERPL CREATININE-BSD FRML MDRD: 85 ML/MIN/1.7M2
GLUCOSE SERPL-MCNC: 75 MG/DL (ref 70–99)
HCT VFR BLD AUTO: 43.4 % (ref 35–47)
HGB BLD-MCNC: 14 G/DL (ref 11.7–15.7)
IMM GRANULOCYTES # BLD: 0 10E9/L (ref 0–0.4)
IMM GRANULOCYTES NFR BLD: 0.5 %
LYMPHOCYTES # BLD AUTO: 2.7 10E9/L (ref 0.8–5.3)
LYMPHOCYTES NFR BLD AUTO: 31.6 %
MCH RBC QN AUTO: 28.7 PG (ref 26.5–33)
MCHC RBC AUTO-ENTMCNC: 32.3 G/DL (ref 31.5–36.5)
MCV RBC AUTO: 89 FL (ref 78–100)
MONOCYTES # BLD AUTO: 0.7 10E9/L (ref 0–1.3)
MONOCYTES NFR BLD AUTO: 8 %
NEUTROPHILS # BLD AUTO: 5 10E9/L (ref 1.6–8.3)
NEUTROPHILS NFR BLD AUTO: 57.7 %
PLATELET # BLD AUTO: 182 10E9/L (ref 150–450)
POTASSIUM SERPL-SCNC: 3.5 MMOL/L (ref 3.4–5.3)
PROT SERPL-MCNC: 6.6 G/DL (ref 6.8–8.8)
RBC # BLD AUTO: 4.87 10E12/L (ref 3.8–5.2)
SODIUM SERPL-SCNC: 143 MMOL/L (ref 133–144)
SPECIMEN EXP DATE BLD: NORMAL
WBC # BLD AUTO: 8.7 10E9/L (ref 4–11)

## 2018-08-24 PROCEDURE — 86850 RBC ANTIBODY SCREEN: CPT | Performed by: OBSTETRICS & GYNECOLOGY

## 2018-08-24 PROCEDURE — 86900 BLOOD TYPING SEROLOGIC ABO: CPT | Performed by: OBSTETRICS & GYNECOLOGY

## 2018-08-24 PROCEDURE — 99214 OFFICE O/P EST MOD 30 MIN: CPT | Performed by: OBSTETRICS & GYNECOLOGY

## 2018-08-24 PROCEDURE — 86901 BLOOD TYPING SEROLOGIC RH(D): CPT | Performed by: OBSTETRICS & GYNECOLOGY

## 2018-08-24 PROCEDURE — 85025 COMPLETE CBC W/AUTO DIFF WBC: CPT | Performed by: OBSTETRICS & GYNECOLOGY

## 2018-08-24 PROCEDURE — 36415 COLL VENOUS BLD VENIPUNCTURE: CPT | Performed by: OBSTETRICS & GYNECOLOGY

## 2018-08-24 PROCEDURE — 80053 COMPREHEN METABOLIC PANEL: CPT | Performed by: OBSTETRICS & GYNECOLOGY

## 2018-08-24 ASSESSMENT — PAIN SCALES - GENERAL: PAINLEVEL: NO PAIN (0)

## 2018-08-24 NOTE — MR AVS SNAPSHOT
After Visit Summary   8/24/2018    Celeste Arguello    MRN: [id number]           Patient Information     Date Of Birth          1985        Visit Information        Provider Department      8/24/2018 3:00 PM Marybeth Grande MD [facility]        Today's Diagnoses     BRCA positive          Care Instructions    You have been scheduled for surgery on: 9/12/18    Diagnosis:  BRCA mutation    The surgical procedure is: Laparoscopic removal of uterus, cervix, both ovaries and fallopian tubes, possible open, cystoscopy    Anticipated length of surgery: 1-2 hrs.  You will be in the recovery room for approximately 2-3 hrs after surgery.  Your family will not be able to see you until after you leave the recovery room.    Length of hospital stay:  This is an outpatient, extended stay surgery.  You will be discharged same day if you meet criteria for discharge.  If you have a larger surgical incision, you will need to be in the hospital 2-3 days.  ______________________________________________________________________    Preparation for Surgery:    To Schedule   1.  Labs:  CBC, CMP, T/S, orders placed, please perform today   2.  Post-operative exam with me 1-2 weeks following surgery      Postoperative Restrictions:  No heavy lifting >20lbs for six weeks, nothing in the vagina (no tampons, no intercourse, no douching) for eight weeks.     Postoperative visit:  Return to clinic 1-2 weeks after surgery for post operative visit.      Please contact the clinic with any questions or concerns.    Marybeth Huizar MD  Gynecologic Oncology  [facility]               Follow-ups after your visit        Your next 10 appointments already scheduled     Aug 27, 2018  4:45 PM CDT   US PELVIC COMPLETE W TRANSVAGINAL with [id number]   [facility] ([facility])    [address] [address] [phone]            Please bring a list of your medicines (including vitamins, minerals and over-the-counter drugs). Also, tell your doctor about any allergies you may have. Wear comfortable clothes and leave your valuables at home.  Adults: Drink four 8-ounce glasses of fluid an hour before your exam. If you need to empty your bladder before your exam, try to release only a little urine. Then, drink another glass of fluid.  Children: Children who are potty trained up to 6 years old should drink at least 2 cups (16 oz) of water/non-carbonated beverage 30 minutes prior to the exam. Children who are 6-10 years should drink at least 3 cups (24 oz) of water/non-carbonated beverage 45 minutes prior to the exam. Children who are 10 years or older should drink at least 4 cups (32 oz) of water/non-carbonated beverage 45 minutes prior to the exam. If your child is very uncomfortable or has an urgent need to pee, please notify a technologist; they will try to find out how much longer the wait may be and provide instructions to help relieve the pressure.  Please call the Imaging Department at your exam site with any questions.            Aug 27, 2018  6:00 PM CDT   (Arrive by 5:45 PM)   New Patient Visit with Fracisco Spencer MD   [facility] ([facility])    [address] [place] [address] [phone]            Sep 12, 2018   Procedure with Marybeth King MD   [facility] Same Day Surgery (--)    [address] [facility] [address] [phone]            Sep 21, 2018  8:00 AM CDT   Post Op with Marybeth King MD   [facility] ([facility])    [address] [place] [address] [phone]            Dec 19, 2018  1:00 PM CST   MR BREAST BILATERAL W/O & W CONTRAST with [id number] [place] [address] MRI ([facility])    [address] [place] [id number]    [phone]           Take your medicines as usual, unless your doctor tells you not to. Bring a list of your current medicines to your exam (including vitamins, minerals and over-the-counter drugs).  The timing of your exam may depend on the start of your last period. If you re in menopause, you may have the exam anytime.  Please bring any previous mammograms or breast MRIs from other facilities to the MRI dept. Do not mail these items to us.   You will have IV contrast for this exam.  You do not need to do anything special to prepare.  The MRI machine uses a strong magnet. Please wear clothes without metal (snaps, zippers). A sweatsuit works well, or we may give you a hospital gown.  Please remove any body piercings and hair extensions before you arrive. You will also remove watches, jewelry, hairpins, wallets, dentures, partial dental plates and hearing aids. You may wear contact lenses, and you may be able to wear your rings. We have a safe place to keep your personal items, but it is safer to leave them at home.  **IMPORTANT** THE INSTRUCTIONS BELOW ARE ONLY FOR THOSE PATIENTS WHO HAVE BEEN PRESCRIBED SEDATION OR GENERAL ANESTHESIA DURING THEIR MRI PROCEDURE:  IF YOUR DOCTOR PRESCRIBED ORAL SEDATION (take medicine to help you relax during your exam):   You must get the medicine from your doctor (oral medication) before you arrive. Bring the medicine to the exam. Do not take it at home. You ll be told when to take it upon arriving for your exam.   Arrive one hour early. Bring someone who can take you home after the test. Your medicine will make you sleepy. After the exam, you may not drive, take a bus or take a taxi by yourself.  IF YOUR DOCTOR PRESCRIBED IV SEDATION:   Arrive one hour early. Bring someone who can take you home after the test. Your medicine will make you sleepy. After the exam, you may not drive, take a bus or take a taxi by yourself.   No eating 6 hours before your exam. You may have clear  liquids up until 4 hours before your exam. (Clear liquids include water, clear tea, black coffee and fruit juice without pulp.)  IF YOUR DOCTOR PRESCRIBED ANESTHESIA (be asleep for your exam):   Arrive 1 1/2 hours early. Bring someone who can take you home after the test. You may not drive, take a bus or take a taxi by yourself.   No eating 8 hours before your exam. You may have clear liquids up until 4 hours before your exam. (Clear liquids include water, clear tea, black coffee and fruit juice without pulp.)   You will spend four to five hours in the recovery room.  If you have any questions, please contact your Imaging Department exam site.            Jun 21, 2019  1:00 PM CDT   [facility] Lab Draw with  [id number] LAB DRAW   [facility] Lab Draw ([facility])    [address] [place] [address]  [address] [place] [id number] [phone]            Jun 21, 2019  1:30 PM CDT   (Arrive by 1:15 PM)   Return Visit with ENRIQUE Cat   [facility] ([facility])    [address] [place] [address]  [address]  [facility] [id number] [phone]              Who to contact     If you have questions or need follow up information about today's clinic visit or your schedule please contact [facility] directly at [phone].  Normal or non-critical lab and imaging results will be communicated to you by MyChart, letter or phone within 4 business days after the clinic has received the results. If you do not hear from us within 7 days, please contact the clinic through [employer] or phone. If you have a critical or abnormal lab result, we will notify you by phone as soon as possible.  Submit refill requests through mention or call your pharmacy and they will forward the refill request to us. Please allow 3 business days for your refill to be completed.          Additional Information About Your Visit        MyChart Information      "[employer] gives you secure access to your electronic health record. If you see a primary care provider, you can also send messages to your care team and make appointments. If you have questions, please call your primary care clinic.  If you do not have a primary care provider, please call [phone] and they will assist you.      [employer] is an electronic gateway that provides easy, online access to your medical records. With [employer], you can request a clinic appointment, read your test results, renew a prescription or communicate with your care team.     To access your existing account, please contact your [facility] or call [phone] for assistance.        Care EveryWhere ID     This is your Care EveryWhere ID. This could be used by other organizations to access your [facility] medical records  [id number]        Your Vitals Were     Pulse Temperature Respirations Height Pulse Oximetry BMI (Body Mass Index)    69 98.3  F (36.8  C) 20 1.575 m (5' 2\") 98% 21.03 kg/m2       Blood Pressure from Last 3 Encounters:   08/24/18 115/75   07/31/18 114/77   07/19/18 122/80    Weight from Last 3 Encounters:   08/24/18 52.2 kg (115 lb)   07/31/18 52.4 kg (115 lb 9 oz)   07/19/18 52.3 kg (115 lb 6.4 oz)              We Performed the Following     ABO/Rh Type and Screen     CBC with platelets differential     Comprehensive metabolic panel        Primary Care Provider Office Phone # Fax #    [facility] [phone] [address] [facility] [address]        Equal Access to Services     [facility] : Hadii aad ku hadasho Soomaali, waaxda luqadaha, qaybta kaalmada adeegyamark, edgardo ramey . So [facility] [phone].    ATENCIÓN: Si habla español, tiene a de jesus disposición servicios gratuitos de asistencia lingüística. Llame al [phone].    We comply with applicable federal civil rights laws and [place] laws. We do not discriminate on the " basis of race, color, national origin, age, disability, sex, sexual orientation, or gender identity.            Thank you!     Thank you for choosing [facility]  for your care. Our goal is always to provide you with excellent care. Hearing back from our patients is one way we can continue to improve our services. Please take a few minutes to complete the written survey that you may receive in the mail after your visit with us. Thank you!             Your Updated Medication List - Protect others around you: Learn how to safely use, store and throw away your medicines at www.disposemymeds.org.          This list is accurate as of 8/24/18  4:15 PM.  Always use your most recent med list.                   Brand Name Dispense Instructions for use Diagnosis    citalopram 20 MG tablet    celeXA    90 tablet    Take 1 tablet (20 mg) by mouth daily    Adjustment disorder with anxious mood       Fluocinolone Acetonide Scalp 0.01 % Oil oil      APPLY TO SCALP TWICE A DAY FOR UP TO 3 WEEKS. WAIT 1 WEEK BETWEEN TREATMENTS.        ketoconazole 2 % shampoo    NIZORAL     LATHER IN SCALP FOR 5 MINUTES PRIOR TO RINSING. USE EVERY OTHER DAY FOR 1 WEEK.        meclizine 25 MG tablet    ANTIVERT     Take 25 mg by mouth 3 times daily as needed        MIRENA (52 MG) 20 MCG/24HR IUD   Generic drug:  levonorgestrel      1 Device by Intrauterine route Was inserted [facility]  2-        nicotine polacrilex 2 MG gum    NICORETTE    150 tablet    Place 1 each (2 mg) inside cheek as needed for smoking cessation    Encounter for smoking cessation counseling       norgestimate-ethinyl estradiol 0.25-35 MG-MCG per tablet    ORTHO-CYCLEN, SPRINTEC    84 tablet    Take active pill daily.    Family history of ovarian cancer, Endometriosis, Oral contraceptive use       ondansetron 4 MG ODT tab    ZOFRAN-ODT     Take 4 mg by mouth every 12 hours

## 2018-08-24 NOTE — LETTER
2018         RE: Celeste Arguello  [address]  [address]  [facility] [address]        Dear Colleague,    Thank you for referring your patient, Celeste Arguello, to the [facility]. Please see a copy of my visit note below.    Consult Notes on Referred Patient        Dr. Miesha Flowers, APRN CNS  [address] [place] [address]       RE: Celeste Arguello  : 1985  KELLY: 2018    HPI:  Celeste Arguello is 33 year old with BRCA1 mutation.  She is accompanied today by her boyfriend.  She is here for pre-operative examination.  She has no concerns today. She is ready to proceed with her prophylactic surgery    Clinical Course:    She underwent genetic testing because her maternal cousin was found to have a mutation and she has a strong family history of breast and ovarian cancer.    2018 - POSITIVE for a BRCA1 mutation. Specifically her mutation is called c.181T>G (p.C61G)    18:  US Pelvis:  The uterus measures 6.9 x 4.7 x 2.8 cm, and there is no evidence of a focal fibroid.  The endometrium is within normal limits and measures 3 mm. There is no free fluid in the pelvis. Intrauterine device in good position.  The right ovary measures 2.2 x 2.7 x 3.2 cm. There is 2.2 x 2.5 x 2.5 cm complex cyst within the right ovary, newly appreciated since . The left ovary is partially obscured by overlying bowel gas but is not enlarged when visualized in the transverse plane, measuring 1.5 x 1.8 cm. There is normal blood flow to the ovaries.         Review of Systems:  Systemic           no weight changes; no fever; no chills; no night sweats; no appetite changes  Skin           no rashes, or lesions  Eye           no irritation; no changes in vision  Esteban-Laryngeal           no dysphagia; no hoarseness   Pulmonary    no cough; no shortness of breath  Cardiovascular    no chest pain; no palpitations  Gastrointestinal    no diarrhea; no constipation;  + abdominal pain; no changes in bowel  habits; no blood in stool  Genitourinary   no urinary frequency; no urinary urgency; no dysuria; no pain; no abnormal vaginal discharge; no abnormal vaginal bleeding; + dyspareunia  Breast    no breast discharge; no breast changes; no breast pain  Musculoskeletal    no myalgias; no arthralgias; no back pain  Psychiatric           no depressed mood; + anxiety; + nervousness    Hematologic            no tender lymph nodes; no noticeable swellings or lumps   Endocrine    no hot flashes; no heat/cold intolerance         Neurological   no tremor; no numbness and [id number]; no headaches; no difficulty sleeping    Obstetrics and Gynecology History:  ,  x 1  She does not desire future fertility      Past Medical History:  Past Medical History:   Diagnosis Date     BRCA1 gene mutation positive 6/15/2018    BRCA1 c.181T>G (p.C61G) tested at [facility] 2018       Past Surgical History:  Past Surgical History:   Procedure Laterality Date     LAPAROSCOPY DIAGNOSTIC (GYN)      endometriosis     urethral reimplant  age 6       Health Maintenance:  Last Pap Smear: 18              Result: negative, HPV negative  She has not had a history of abnormal Pap smears.    Last Mammogram: 18              Result: normal      She has not had a history of abnormal mammograms.    Last Colonoscopy: N/A      Current Medications:   has a current medication list which includes the following prescription(s): citalopram, levonorgestrel, nicotine polacrilex, norgestimate-ethinyl estradiol, ondansetron, fluocinolone acetonide scalp, ketoconazole, and meclizine.     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 One    How much calcium per day? Dairy products       How much caffeine per day? 1 cup coffee and 2 can of coke    How much vitamin D per day? None    Do you/your family wear seatbelts?  Yes    Do you/your family use safety helmets? yes    Do you/your family use sunscreen? Yes    Do you/your family keep firearms in the home? No    Do you/your family have a smoke detector(s)? Yes        Do you feel safe in your home? Yes    Has anyone ever touched you in an unwanted manner? No     Explain     SEE GLADYS [facility] [address]     2013    Beronica Dumas [facility] [address] 18               Lives with daughter who is 9, feels safe at home.  Works as substance abuse counselor.  Enjoys writing, photography, reading.  Does not have an advanced directive on file and would like her friend, Olga to be her POA.  Full code    Family History:   The patient's family history is significant for.  Family History   Problem Relation Age of Onset     Ovarian Cancer Mother 38      age 47     Hereditary Breast and Ovarian Cancer Syndrome Maternal Aunt 50     Breast cancer gene -- MEHRDAD/BSO and mastectomy     Pancreatic Cancer Maternal Uncle 38     Alcohol/Drug Brother      active     Alcohol/Drug Brother      recovering     Psychotic Disorder Brother      depression and ADHD     Breast Cancer Maternal Aunt 35     & MC age 36     Uterine Cancer Maternal Aunt      C.A.D. No family hx of      Diabetes No family hx of      Hypertension No family hx of      Coronary Artery Disease No family hx of      Cerebrovascular  "Disease No family hx of      Hyperlipidemia No family hx of      Colon Cancer No family hx of      Prostate Cancer No family hx of      Thyroid Disease No family hx of          Physical Exam:   /75  Pulse 69  Temp 98.3  F (36.8  C)  Resp 20  Ht 1.575 m (5' 2\")  Wt 52.2 kg (115 lb)  SpO2 98%  BMI 21.03 kg/m2  Body mass index is 21.03 kg/(m^2).    General Appearance: healthy and alert, no distress     HEENT:  no thyromegaly, no palpable nodules or masses        Cardiovascular: regular rate and rhythm, no gallops, rubs or murmurs     Respiratory: lungs clear, no rales, rhonchi or wheezes, normal diaphragmatic excursion    Musculoskeletal: extremities non tender and without edema    Skin: no lesions or rashes     Neurological: normal gait, no gross defects     Psychiatric: appropriate mood and affect                               Hematological: normal cervical, supraclavicular and inguinal lymph nodes     Gastrointestinal:       abdomen soft, non-tender, non-distended, no organomegaly or masses    Genitourinary: Deferred      Assessment:    Celeste Arguello is a 33 year old woman with a diagnosis of BRCA1.     A total of 30 minutes was spent with the patient, >50% of which were spent in counseling the patient and/or treatment planning.      Plan:     1.)    BRCA1 mutation.  We discussed her lifetime risks of both ovarian and breat cancer with this mutation.  We discussed that given this the recommendation is for RRSO at the completion of childbearing given her age and family history of early onset ovarian cancer.  We discussed that this will dramatically decrease her risk of developing ovarian cancer but that there does still remain a small risk of developing primary peritoneal cancer.  We also discussed that RRSO has been shown to decrease her risk of breast cancer.  We also discussed that there is emerging data that some endometrial cancers may be linked to BRCA and thus discussed the risks and " benefits of prophylactic hysterectomy at the time of her surgery and she would like to proceed with hysterectomy.  We discussed the risks of premature surgical menopause and that menopausal symptoms are different for every woman but there are several treatment options available if she desires/needs it post-operatively.  We discussed the risk of BRCA mutation for her family, especially her children and she is aware.  We discussed the small possibility of an occult cancer at the time of surgery and that if this is found, we would proceed with frozen section and the cancer staging/debulking procedure at that time.  We also discussed the possibility of a microscopic/STIC tumor discovered on final pathology and that in this case she would require a second staging procedure.  Risks, benefits, indications and alternatives were discussed with the patient. All her questions were answered and consents were signed for laparoscopic removal of uterus, cervix, both ovaries and fallopian tubes, possible open, cystoscopy on 9/12    2.) Surgical menopause.  We discussed the options for treatment including hormonal options.  We discussed this in light of her increased risk of breat cancer with the BRCA mutation as well as her smoking status.  She is wanting to quit soon and has nicorrete gum which has helped her quit in the past.      3.) Genetic risk factors were assessed and the patient has a known BRCA1 mutation.      4.) Labs and/or tests ordered include:  CBC, CMP, T/S     5.) Health maintenance issues addressed today include pt is up to date.    6.) Pre-operative teaching was completed today           Thank you for allowing us to participate in the care of your patient.         Sincerely,    Marybeth Huizar MD  Gynecologic Oncology  [facility]       KIKI ACOSTA

## 2018-08-24 NOTE — NURSING NOTE
"Oncology Rooming Note    August 24, 2018 3:04 PM   Celeste Arguello is a 33 year old female who presents for:    Chief Complaint   Patient presents with     Oncology Clinic Visit     pre-op 9/12/18     Initial Vitals: /75  Pulse 69  Temp 98.3  F (36.8  C)  Resp 20  Ht 1.575 m (5' 2\")  Wt 52.2 kg (115 lb)  SpO2 98%  BMI 21.03 kg/m2 Estimated body mass index is 21.03 kg/(m^2) as calculated from the following:    Height as of this encounter: 1.575 m (5' 2\").    Weight as of this encounter: 52.2 kg (115 lb). Body surface area is 1.51 meters squared.  No Pain (0) Comment: Data Unavailable   No LMP recorded. Patient is not currently having periods (Reason: IUD).  Allergies reviewed: Yes  Medications reviewed: Yes    Medications: Medication refills not needed today.  Pharmacy name entered into [facility]:    [facility] [place], [place] - [address] [facility] [address] [id number] [place], [place] - [address] [facility] [address] [place] [address]     5 minutes for nursing intake (face to face time)     Brittni Torres LPN              "

## 2018-08-24 NOTE — NURSING NOTE
Patient Education Note - [facility]    Relevant Diagnosis:  BRCA Mutation    Teaching Topic:  Laparoscopic removal of uterus, cervix, both ovaries and fallopian tubes, possible open, cystoscopy    Person(s) involved in teaching:  Patient and friend    Motivation Level:    Asks Questions:   Yes  Eager to Learn:  Yes  Cooperative:  Yes  Receptive (willing/able to accept information):  Yes  Comments:  None    Patient demonstrates understanding of the following:  Reason for the appointment, diagnosis and treatment plan:  Yes  Knowledge of proper use of medications and conditions for which they are ordered (with special attention to potential side effects or drug interactions):  Yes  Which situations necessitate calling provider and whom to contact:  Yes    Teaching Concerns:  No    Education/Instructional Materials Used/Given:     Before Your Surgery Booklet ([employer])  Showering Before Surgery, CHG prep provided  Adult Pain Assessment Tool  Lymphedema: A Patient Resource Guide  Hysterectomy Guidelines   Home Care after Major Abdominal or Vaginal Surgery  [facility]  Accommodations Brochure   Phone numbers for [facility] and [address] Given to Patient    EKG: Not ordered  Lab: Completed today in clinic  Chest xray: Not ordered    Post-op exam: 9/21/18     Pre-op exam: N/A    Time spent teaching with patient:  20 minutes    Surgical consent forms faxed to [facility] Pre-Admissions at fax number [phone].  Surgery scheduling staff at [facility] also notified.    Jose L Bower, RN, BSN, OCN  Oncology Care Coordinator  [facility]

## 2018-08-27 ENCOUNTER — MYC MEDICAL ADVICE (OUTPATIENT)
Dept: ONCOLOGY | Facility: CLINIC | Age: 33
End: 2018-08-27

## 2018-08-27 ENCOUNTER — OFFICE VISIT (OUTPATIENT)
Dept: DERMATOLOGY | Facility: CLINIC | Age: 33
End: 2018-08-27
Payer: COMMERCIAL

## 2018-08-27 ENCOUNTER — RADIANT APPOINTMENT (OUTPATIENT)
Dept: ULTRASOUND IMAGING | Facility: CLINIC | Age: 33
End: 2018-08-27
Attending: CLINICAL NURSE SPECIALIST
Payer: COMMERCIAL

## 2018-08-27 DIAGNOSIS — L70.0 ACNE COMEDONE: Primary | ICD-10-CM

## 2018-08-27 DIAGNOSIS — D22.5 MELANOCYTIC NEVUS OF TRUNK: ICD-10-CM

## 2018-08-27 DIAGNOSIS — N83.291 COMPLEX CYST OF RIGHT OVARY: ICD-10-CM

## 2018-08-27 RX ORDER — ADAPALENE 0.1 G/100G
CREAM TOPICAL AT BEDTIME
Qty: 45 G | Refills: 1 | Status: SHIPPED | OUTPATIENT
Start: 2018-08-27 | End: 2018-09-10

## 2018-08-27 ASSESSMENT — PAIN SCALES - GENERAL: PAINLEVEL: NO PAIN (0)

## 2018-08-27 NOTE — LETTER
2018       RE: Celeste Arguello  [address]  [address]  [facility] [address]     Dear Colleague,    Thank you for referring your patient, Celeste Arguello, to the [facility] DERMATOLOGY at [facility]. Please see a copy of my visit note below.    [facility] Dermatology Note      Dermatology Problem List:    Specialty Problems     None          CC:   Derm Problem (Celeste is here for a skin check. Due to genetic predisposition. She states that she has a concern of a dark spot on her face. )        Encounter Date: Aug 27, 2018    History of Present Illness:  Ms. Celeste Arguello is a 33 year old female who presents as a referral from [facility].    Past Medical History:   Patient Active Problem List   Diagnosis     Mother  of ovarian cancer, age 47     Vitamin d deficiency 24     Female genital symptoms     Encounter for routine gynecological examination     Endometriosis     Mirena IUD (intrauterine device) in place     Oral contraceptive use -- for cyst control     BRCA1 gene mutation positive     Surveillance of previously prescribed intrauterine contraceptive device     Pelvic pain in female     Past Medical History:   Diagnosis Date     BRCA1 gene mutation positive 6/15/2018    BRCA1 c.181T>G (p.C61G) tested at [facility] 2018       Allergy History:     Allergies   Allergen Reactions     Nkda [No Known Drug Allergies]        Social History:  Had many sun burns before, but never used tanning beds     reports that she has been smoking Cigarettes.  She started smoking about 12 years ago. She has a 5.00 pack-year smoking history. She has never used smokeless tobacco. She reports that she drinks alcohol. She reports that she does not use illicit drugs.      Family History:  Family History   Problem Relation Age of Onset     Ovarian Cancer Mother 38      age 47     Hereditary Breast and Ovarian Cancer Syndrome Maternal Aunt 50     Breast  cancer gene -- MEHRDAD/BSO and mastectomy     Pancreatic Cancer Maternal Uncle 38     Alcohol/Drug Brother      active     Alcohol/Drug Brother      recovering     Psychotic Disorder Brother      depression and ADHD     Breast Cancer Maternal Aunt 35     & MC age 36     Uterine Cancer Maternal Aunt      C.A.D. No family hx of      Diabetes No family hx of      Hypertension No family hx of      Coronary Artery Disease No family hx of      Cerebrovascular Disease No family hx of      Hyperlipidemia No family hx of      Colon Cancer No family hx of      Prostate Cancer No family hx of      Thyroid Disease No family hx of        Medications:  Current Outpatient Prescriptions   Medication Sig Dispense Refill     citalopram (CELEXA) 20 MG tablet Take 1 tablet (20 mg) by mouth daily 90 tablet 4     Fluocinolone Acetonide Scalp 0.01 % OIL oil APPLY TO SCALP TWICE A DAY FOR UP TO 3 WEEKS. WAIT 1 WEEK BETWEEN TREATMENTS.  4     ketoconazole (NIZORAL) 2 % shampoo LATHER IN SCALP FOR 5 MINUTES PRIOR TO RINSING. USE EVERY OTHER DAY FOR 1 WEEK.  0     levonorgestrel (MIRENA, 52 MG,) 20 MCG/24HR IUD 1 Device by Intrauterine route Was inserted [facility]  2-       meclizine (ANTIVERT) 25 MG tablet Take 25 mg by mouth 3 times daily as needed       nicotine polacrilex (NICORETTE) 2 MG gum Place 1 each (2 mg) inside cheek as needed for smoking cessation 150 tablet 3     norgestimate-ethinyl estradiol (ORTHO-CYCLEN, SPRINTEC) 0.25-35 MG-MCG per tablet Take active pill daily. 84 tablet 3     ondansetron (ZOFRAN-ODT) 4 MG ODT tab Take 4 mg by mouth every 12 hours             Review of Systems:  -As per HPI  -Constitutional: The patient denies fatigue, fevers, chills, unintended weight loss, and night sweats.  -HEENT: Patient denies nonhealing oral sores.  -Skin: As above in HPI. No additional skin concerns.    Physical exam:  Vitals: There were no vitals taken for this visit.  GEN: This is a well developed, well-nourished female in no acute  distress, in a pleasant mood.    SKIN: Full skin, which includes the head/face, both arms, chest, back, abdomen,both legs, genitalia and/or groin buttocks, digits and/or nails, was examined.  Many Naevi naevocellulares on the trunk and some dark and exphytic ones left inguinal and buttock area  Scalp temporal left a 1x5mm non-pigemented seborrhoic keratosis  Mamma left a [id number] Naevus naevocellularis, in form assymetric, but in color light brown and dermatoscopically not suspicious    -No other lesions of concern on areas examined.     Impression/Plan:    1) Various melanocytic Naevi    Photo of one on left Mamma --> observe and if changes then do biopsy    Given info ABCD on wrap up and put on photo    2) Acne comedonica face    Every evening Differin gel for 3-4 months      Follow-up in 1-2 year        Pictures were placed in Pt's chart today for future reference.        Again, thank you for allowing me to participate in the care of your patient.      Sincerely,    Fracisco Spencer MD

## 2018-08-27 NOTE — NURSING NOTE
Dermatology Rooming Note    Celeste Arguello's goals for this visit include:   Chief Complaint   Patient presents with     Derm Problem     Celeste is here for a skin check. Due to genetic predisposition. She states that she has a concern of a dark spot on her face.      Maria Elena Lilly LPN

## 2018-08-27 NOTE — MR AVS SNAPSHOT
After Visit Summary   8/27/2018    Celeste Arguello    MRN: [id number]           Patient Information     Date Of Birth          1985        Visit Information        Provider Department      8/27/2018 6:00 PM Fracisco Spencer MD [facility] Dermatology        Today's Diagnoses     Acne comedone    -  1    Melanocytic nevus of trunk          Care Instructions    The ABCDEs of Melanoma    Skin cancer can develop anywhere on the skin. Ask someone for help when checking your skin, especially in hard to see places. If you notice a mole different from others, or that changes, enlarges, itches, or bleeds (even if it is small), you should see a dermatologist.                      Follow-ups after your visit        Your next 10 appointments already scheduled     Sep 12, 2018   Procedure with Marybeth King MD   [facility], [place], Same Day Surgery (--)    [address] [id number] [phone]            Sep 21, 2018  8:00 AM CDT   Post Op with Marybeth King MD   [facility]    [address] [place] [id number] [phone]            Dec 19, 2018  1:00 PM CST   MR BREAST BILATERAL W/O & W CONTRAST with [address] [facility] ([facility])    [address] [address] [phone]           Take your medicines as usual, unless your doctor tells you not to. Bring a list of your current medicines to your exam (including vitamins, minerals and over-the-counter drugs).  The timing of your exam may depend on the start of your last period. If you re in menopause, you may have the exam anytime.  Please bring any previous mammograms or breast MRIs from other facilities to the MRI dept. Do not mail these items to us.   You will have IV contrast for this exam.  You do not need to do anything special to prepare.  The MRI machine uses a strong magnet.  Please wear clothes without metal (snaps, zippers). A sweatsuit works well, or we may give you a hospital gown.  Please remove any body piercings and hair extensions before you arrive. You will also remove watches, jewelry, hairpins, wallets, dentures, partial dental plates and hearing aids. You may wear contact lenses, and you may be able to wear your rings. We have a safe place to keep your personal items, but it is safer to leave them at home.  **IMPORTANT** THE INSTRUCTIONS BELOW ARE ONLY FOR THOSE PATIENTS WHO HAVE BEEN PRESCRIBED SEDATION OR GENERAL ANESTHESIA DURING THEIR MRI PROCEDURE:  IF YOUR DOCTOR PRESCRIBED ORAL SEDATION (take medicine to help you relax during your exam):   You must get the medicine from your doctor (oral medication) before you arrive. Bring the medicine to the exam. Do not take it at home. You ll be told when to take it upon arriving for your exam.   Arrive one hour early. Bring someone who can take you home after the test. Your medicine will make you sleepy. After the exam, you may not drive, take a bus or take a taxi by yourself.  IF YOUR DOCTOR PRESCRIBED IV SEDATION:   Arrive one hour early. Bring someone who can take you home after the test. Your medicine will make you sleepy. After the exam, you may not drive, take a bus or take a taxi by yourself.   No eating 6 hours before your exam. You may have clear liquids up until 4 hours before your exam. (Clear liquids include water, clear tea, black coffee and fruit juice without pulp.)  IF YOUR DOCTOR PRESCRIBED ANESTHESIA (be asleep for your exam):   Arrive 1 1/2 hours early. Bring someone who can take you home after the test. You may not drive, take a bus or take a taxi by yourself.   No eating 8 hours before your exam. You may have clear liquids up until 4 hours before your exam. (Clear liquids include water, clear tea, black coffee and fruit juice without pulp.)   You will spend four to five hours in the recovery room.  If you have  any questions, please contact your Imaging Department exam site.            Jun 21, 2019  1:00 PM CDT   [facility] Draw with  [facility] DRAW   [facility] ([facility])    [address]  [address]  [facility] [id number] [phone]            Jun 21, 2019  1:30 PM CDT   (Arrive by 1:15 PM)   Return Visit with ENRIQUE Cat   [facility] ([facility])    [address]  [address]  [facility] [address] [phone]              Who to contact     Please call your clinic at [phone] to:    Ask questions about your health    Make or cancel appointments    Discuss your medicines    Learn about your test results    Speak to your doctor            Additional Information About Your Visit        [employer] Information     [employer] gives you secure access to your electronic health record. If you see a primary care provider, you can also send messages to your care team and make appointments. If you have questions, please call your primary care clinic.  If you do not have a primary care provider, please call [phone] and they will assist you.      [employer] is an electronic gateway that provides easy, online access to your medical records. With [employer], you can request a clinic appointment, read your test results, renew a prescription or communicate with your care team.     To access your existing account, please contact your [facility] or call [phone] for assistance.        Care EveryWhere ID     This is your Care EveryWhere ID. This could be used by other organizations to access your [facility] medical records  [id number]         Blood Pressure from Last 3 Encounters:   08/24/18 115/75   07/31/18 114/77   07/19/18 122/80    Weight from Last 3 Encounters:   08/24/18 52.2 kg (115 lb)   07/31/18 52.4 kg (115 lb 9 oz)   07/19/18 52.3 kg (115 lb 6.4 oz)               Today, you had the following     No orders found for display         Today's Medication Changes          These changes are accurate as of 8/27/18  6:53 PM.  If you have any questions, ask your nurse or doctor.               Start taking these medicines.        Dose/Directions    adapalene 0.1 % cream   Commonly known as:  DIFFERIN   Used for:  Acne comedone   Started by:  Fracisco Spencer MD        Apply topically At Bedtime   Quantity:  45 g   Refills:  1            Where to get your medicines      These medications were sent to [place] [id number] - [place], [place] - [address]  [address] [address]     Phone:  [phone]     adapalene 0.1 % cream                Primary Care Provider Office Phone # Fax #    [facility] [facility] [phone] [address] [address]        Equal Access to Services     [facility] : Hadii liam ku hadasho Soomaali, waaxda luqadaha, qaybta kaalmada adeegyada, waxay anisha haymanisha ramey . So [facility] [phone].    ATENCIÓN: Si habla español, tiene a de jesus disposición servicios gratuitos de asistencia lingüística. Llame al [phone].    We comply with applicable federal civil rights laws and [place] laws. We do not discriminate on the basis of race, color, national origin, age, disability, sex, sexual orientation, or gender identity.            Thank you!     Thank you for choosing [facility]ER[facility]  for your care. Our goal is always to provide you with excellent care. Hearing back from our patients is one way we can continue to improve our services. Please take a few minutes to complete the written survey that you may receive in the mail after your visit with us. Thank you!             Your Updated Medication List - Protect others around you: Learn how to safely use, store and throw away your medicines at www.disposemymeds.org.          This list is accurate as of 8/27/18  6:53 PM.   Always use your most recent med list.                   Brand Name Dispense Instructions for use Diagnosis    adapalene 0.1 % cream    DIFFERIN    45 g    Apply topically At Bedtime    Acne comedone       citalopram 20 MG tablet    celeXA    90 tablet    Take 1 tablet (20 mg) by mouth daily    Adjustment disorder with anxious mood       Fluocinolone Acetonide Scalp 0.01 % Oil oil      APPLY TO SCALP TWICE A DAY FOR UP TO 3 WEEKS. WAIT 1 WEEK BETWEEN TREATMENTS.        ketoconazole 2 % shampoo    NIZORAL     LATHER IN SCALP FOR 5 MINUTES PRIOR TO RINSING. USE EVERY OTHER DAY FOR 1 WEEK.        meclizine 25 MG tablet    ANTIVERT     Take 25 mg by mouth 3 times daily as needed        MIRENA (52 MG) 20 MCG/24HR IUD   Generic drug:  levonorgestrel      1 Device by Intrauterine route Was inserted [facility]  2-        nicotine polacrilex 2 MG gum    NICORETTE    150 tablet    Place 1 each (2 mg) inside cheek as needed for smoking cessation    Encounter for smoking cessation counseling       norgestimate-ethinyl estradiol 0.25-35 MG-MCG per tablet    ORTHO-CYCLEN, SPRINTEC    84 tablet    Take active pill daily.    Family history of ovarian cancer, Endometriosis, Oral contraceptive use       ondansetron 4 MG ODT tab    ZOFRAN-ODT     Take 4 mg by mouth every 12 hours

## 2018-08-27 NOTE — PROGRESS NOTES
[facility] Dermatology Note      Dermatology Problem List:    Specialty Problems     None          CC:   Derm Problem (Celeste is here for a skin check. Due to genetic predisposition. She states that she has a concern of a dark spot on her face. )        Encounter Date: Aug 27, 2018    History of Present Illness:  Ms. Celeste Arguello is a 33 year old female who presents as a referral from [facility].    Past Medical History:   Patient Active Problem List   Diagnosis     Mother  of ovarian cancer, age 47     Vitamin d deficiency 23     Female genital symptoms     Encounter for routine gynecological examination     Endometriosis     Mirena IUD (intrauterine device) in place     Oral contraceptive use -- for cyst control     BRCA1 gene mutation positive     Surveillance of previously prescribed intrauterine contraceptive device     Pelvic pain in female     Past Medical History:   Diagnosis Date     BRCA1 gene mutation positive 6/15/2018    BRCA1 c.181T>G (p.C61G) tested at [facility] 2018       Allergy History:     Allergies   Allergen Reactions     Nkda [No Known Drug Allergies]        Social History:  Had many sun burns before, but never used tanning beds     reports that she has been smoking Cigarettes.  She started smoking about 12 years ago. She has a 5.00 pack-year smoking history. She has never used smokeless tobacco. She reports that she drinks alcohol. She reports that she does not use illicit drugs.      Family History:  Family History   Problem Relation Age of Onset     Ovarian Cancer Mother 38      age 47     Hereditary Breast and Ovarian Cancer Syndrome Maternal Aunt 50     Breast cancer gene -- MEHRDAD/BSO and mastectomy     Pancreatic Cancer Maternal Uncle 38     Alcohol/Drug Brother      active     Alcohol/Drug Brother      recovering     Psychotic Disorder Brother      depression and ADHD     Breast Cancer Maternal Aunt 35     & MC age 36     Uterine Cancer Maternal Aunt       C.A.D. No family hx of      Diabetes No family hx of      Hypertension No family hx of      Coronary Artery Disease No family hx of      Cerebrovascular Disease No family hx of      Hyperlipidemia No family hx of      Colon Cancer No family hx of      Prostate Cancer No family hx of      Thyroid Disease No family hx of        Medications:  Current Outpatient Prescriptions   Medication Sig Dispense Refill     citalopram (CELEXA) 20 MG tablet Take 1 tablet (20 mg) by mouth daily 90 tablet 4     Fluocinolone Acetonide Scalp 0.01 % OIL oil APPLY TO SCALP TWICE A DAY FOR UP TO 3 WEEKS. WAIT 1 WEEK BETWEEN TREATMENTS.  4     ketoconazole (NIZORAL) 2 % shampoo LATHER IN SCALP FOR 5 MINUTES PRIOR TO RINSING. USE EVERY OTHER DAY FOR 1 WEEK.  0     levonorgestrel (MIRENA, 52 MG,) 20 MCG/24HR IUD 1 Device by Intrauterine route Was inserted [facility]  2-       meclizine (ANTIVERT) 25 MG tablet Take 25 mg by mouth 3 times daily as needed       nicotine polacrilex (NICORETTE) 2 MG gum Place 1 each (2 mg) inside cheek as needed for smoking cessation 150 tablet 3     norgestimate-ethinyl estradiol (ORTHO-CYCLEN, SPRINTEC) 0.25-35 MG-MCG per tablet Take active pill daily. 84 tablet 3     ondansetron (ZOFRAN-ODT) 4 MG ODT tab Take 4 mg by mouth every 12 hours             Review of Systems:  -As per HPI  -Constitutional: The patient denies fatigue, fevers, chills, unintended weight loss, and night sweats.  -HEENT: Patient denies nonhealing oral sores.  -Skin: As above in HPI. No additional skin concerns.    Physical exam:  Vitals: There were no vitals taken for this visit.  GEN: This is a well developed, well-nourished female in no acute distress, in a pleasant mood.    SKIN: Full skin, which includes the head/face, both arms, chest, back, abdomen,both legs, genitalia and/or groin buttocks, digits and/or nails, was examined.  Many Naevi naevocellulares on the trunk and some dark and exphytic ones left inguinal and buttock area  Scalp  temporal left a 1x5mm non-pigemented seborrhoic keratosis  Mamma left a [id number] Naevus naevocellularis, in form assymetric, but in color light brown and dermatoscopically not suspicious    -No other lesions of concern on areas examined.     Impression/Plan:    1) Various melanocytic Naevi    Photo of one on left Mamma --> observe and if changes then do biopsy    Given info ABCD on wrap up and put on photo    2) Acne comedonica face    Every evening Differin gel for 3-4 months      Follow-up in 1-2 year

## 2018-08-27 NOTE — PATIENT INSTRUCTIONS
The ABCDEs of Melanoma    Skin cancer can develop anywhere on the skin. Ask someone for help when checking your skin, especially in hard to see places. If you notice a mole different from others, or that changes, enlarges, itches, or bleeds (even if it is small), you should see a dermatologist.

## 2018-08-29 NOTE — TELEPHONE ENCOUNTER
I have tried calling her several times with no answer.  I have asked her to call the office when she can so we can discuss.  I will keep trying to reach her

## 2018-09-04 ENCOUNTER — CARE COORDINATION (OUTPATIENT)
Dept: ONCOLOGY | Facility: CLINIC | Age: 33
End: 2018-09-04

## 2018-09-04 NOTE — PROGRESS NOTES
Faxed completed short-term disability form to [facility]. Fax number [phone]. Called patient to inform her that it was faxed. Did not leave a message as she has a generic voice mail message. Sent to be scanned to patient's chart. Myra Gaspar RN

## 2018-09-10 ENCOUNTER — ANESTHESIA EVENT (OUTPATIENT)
Dept: SURGERY | Facility: CLINIC | Age: 33
End: 2018-09-10
Payer: COMMERCIAL

## 2018-09-11 ASSESSMENT — LIFESTYLE VARIABLES: TOBACCO_USE: 1

## 2018-09-11 NOTE — ANESTHESIA PREPROCEDURE EVALUATION
Anesthesia Evaluation     .             ROS/MED HX    ENT/Pulmonary:     (+)tobacco use, , . .    Neurologic:     (+)other neuro vertigo    Cardiovascular:  - neg cardiovascular ROS   (+) ----. : . . . :. . No previous cardiac testing       METS/Exercise Tolerance:  >4 METS   Hematologic:  - neg hematologic  ROS       Musculoskeletal:  - neg musculoskeletal ROS       GI/Hepatic:  - neg GI/hepatic ROS       Renal/Genitourinary:     (+) Other Renal/ Genitourinary, BRCA Positive w/ family history of early death due to ovarian ca      Endo:  - neg endo ROS       Psychiatric:  - neg psychiatric ROS   (+) psychiatric history anxiety      Infectious Disease:  - neg infectious disease ROS       Malignancy:      - no malignancy   Other:    (+) C-spine cleared: No, no H/O Chronic Pain,no other significant disability                    Physical Exam  Normal systems: cardiovascular and pulmonary    Airway   Mallampati: II  TM distance: >3 FB  Neck ROM: full    Dental     Cardiovascular   Rhythm and rate: regular and normal      Pulmonary    breath sounds clear to auscultation(-) no wheezes and no rales                    Anesthesia Plan      History & Physical Review      ASA Status:  2 .    NPO Status:  > 8 hours    Plan for General and ETT with Intravenous induction. Maintenance will be Balanced.    PONV prophylaxis:  Ondansetron (or other 5HT-3) and Dexamethasone or Solumedrol  Additional equipment: 2nd IV      Postoperative Care  Postoperative pain management:  IV analgesics, Oral pain medications and Multi-modal analgesia.      Consents  Anesthetic plan, risks, benefits and alternatives discussed with:  Patient.  Use of blood products discussed: Yes.   Use of blood products discussed with Patient.  Consented to blood products.  .                          .

## 2018-09-12 ENCOUNTER — ANESTHESIA (OUTPATIENT)
Dept: SURGERY | Facility: CLINIC | Age: 33
End: 2018-09-12
Payer: COMMERCIAL

## 2018-09-12 ENCOUNTER — SURGERY (OUTPATIENT)
Age: 33
End: 2018-09-12
Payer: COMMERCIAL

## 2018-09-12 ENCOUNTER — HOSPITAL ENCOUNTER (OUTPATIENT)
Facility: CLINIC | Age: 33
Discharge: HOME | End: 2018-09-12
Attending: OBSTETRICS & GYNECOLOGY | Admitting: OBSTETRICS & GYNECOLOGY
Payer: COMMERCIAL

## 2018-09-12 VITALS
RESPIRATION RATE: 16 BRPM | WEIGHT: 111.33 LBS | TEMPERATURE: 97.5 F | DIASTOLIC BLOOD PRESSURE: 53 MMHG | OXYGEN SATURATION: 99 % | SYSTOLIC BLOOD PRESSURE: 91 MMHG | BODY MASS INDEX: 21.02 KG/M2 | HEART RATE: 63 BPM | HEIGHT: 61 IN

## 2018-09-12 DIAGNOSIS — Z15.01 BRCA POSITIVE: ICD-10-CM

## 2018-09-12 DIAGNOSIS — Z15.09 BRCA POSITIVE: ICD-10-CM

## 2018-09-12 DIAGNOSIS — Z90.710 S/P LAPAROSCOPIC HYSTERECTOMY: Primary | ICD-10-CM

## 2018-09-12 LAB
ABO + RH BLD: NORMAL
ABO + RH BLD: NORMAL
BLD GP AB SCN SERPL QL: NORMAL
BLOOD BANK CMNT PATIENT-IMP: NORMAL
GLUCOSE BLDC GLUCOMTR-MCNC: 86 MG/DL (ref 70–99)
HCG UR QL: NEGATIVE
SPECIMEN EXP DATE BLD: NORMAL

## 2018-09-12 PROCEDURE — 88112 CYTOPATH CELL ENHANCE TECH: CPT | Performed by: OBSTETRICS & GYNECOLOGY

## 2018-09-12 PROCEDURE — 25000132 ZZH RX MED GY IP 250 OP 250 PS 637: Performed by: STUDENT IN AN ORGANIZED HEALTH CARE EDUCATION/TRAINING PROGRAM

## 2018-09-12 PROCEDURE — 36000064 ZZH SURGERY LEVEL 4 EA 15 ADDTL MIN - UMMC: Performed by: OBSTETRICS & GYNECOLOGY

## 2018-09-12 PROCEDURE — 25000128 H RX IP 250 OP 636: Performed by: STUDENT IN AN ORGANIZED HEALTH CARE EDUCATION/TRAINING PROGRAM

## 2018-09-12 PROCEDURE — 36415 COLL VENOUS BLD VENIPUNCTURE: CPT | Performed by: ANESTHESIOLOGY

## 2018-09-12 PROCEDURE — 81025 URINE PREGNANCY TEST: CPT | Performed by: OBSTETRICS & GYNECOLOGY

## 2018-09-12 PROCEDURE — 25000132 ZZH RX MED GY IP 250 OP 250 PS 637: Performed by: ANESTHESIOLOGY

## 2018-09-12 PROCEDURE — 25000125 ZZHC RX 250: Performed by: STUDENT IN AN ORGANIZED HEALTH CARE EDUCATION/TRAINING PROGRAM

## 2018-09-12 PROCEDURE — 25000128 H RX IP 250 OP 636: Performed by: OBSTETRICS & GYNECOLOGY

## 2018-09-12 PROCEDURE — 36000062 ZZH SURGERY LEVEL 4 1ST 30 MIN - UMMC: Performed by: OBSTETRICS & GYNECOLOGY

## 2018-09-12 PROCEDURE — 37000009 ZZH ANESTHESIA TECHNICAL FEE, EACH ADDTL 15 MIN: Performed by: OBSTETRICS & GYNECOLOGY

## 2018-09-12 PROCEDURE — 25000566 ZZH SEVOFLURANE, EA 15 MIN: Performed by: OBSTETRICS & GYNECOLOGY

## 2018-09-12 PROCEDURE — 37000008 ZZH ANESTHESIA TECHNICAL FEE, 1ST 30 MIN: Performed by: OBSTETRICS & GYNECOLOGY

## 2018-09-12 PROCEDURE — 86850 RBC ANTIBODY SCREEN: CPT | Performed by: ANESTHESIOLOGY

## 2018-09-12 PROCEDURE — 86900 BLOOD TYPING SEROLOGIC ABO: CPT | Performed by: ANESTHESIOLOGY

## 2018-09-12 PROCEDURE — 00000155 ZZHCL STATISTIC H-CELL BLOCK W/STAIN: Performed by: OBSTETRICS & GYNECOLOGY

## 2018-09-12 PROCEDURE — 88309 TISSUE EXAM BY PATHOLOGIST: CPT | Performed by: OBSTETRICS & GYNECOLOGY

## 2018-09-12 PROCEDURE — 40000170 ZZH STATISTIC PRE-PROCEDURE ASSESSMENT II: Performed by: OBSTETRICS & GYNECOLOGY

## 2018-09-12 PROCEDURE — 86901 BLOOD TYPING SEROLOGIC RH(D): CPT | Performed by: ANESTHESIOLOGY

## 2018-09-12 PROCEDURE — 27210794 ZZH OR GENERAL SUPPLY STERILE: Performed by: OBSTETRICS & GYNECOLOGY

## 2018-09-12 PROCEDURE — 71000014 ZZH RECOVERY PHASE 1 LEVEL 2 FIRST HR: Performed by: OBSTETRICS & GYNECOLOGY

## 2018-09-12 PROCEDURE — 25000132 ZZH RX MED GY IP 250 OP 250 PS 637: Performed by: OBSTETRICS & GYNECOLOGY

## 2018-09-12 PROCEDURE — 25000128 H RX IP 250 OP 636: Performed by: ANESTHESIOLOGY

## 2018-09-12 PROCEDURE — 82962 GLUCOSE BLOOD TEST: CPT

## 2018-09-12 PROCEDURE — 71000027 ZZH RECOVERY PHASE 2 EACH 15 MINS: Performed by: OBSTETRICS & GYNECOLOGY

## 2018-09-12 PROCEDURE — 58301 REMOVE INTRAUTERINE DEVICE: CPT | Mod: GC | Performed by: OBSTETRICS & GYNECOLOGY

## 2018-09-12 PROCEDURE — 88305 TISSUE EXAM BY PATHOLOGIST: CPT | Performed by: OBSTETRICS & GYNECOLOGY

## 2018-09-12 PROCEDURE — 88331 PATH CONSLTJ SURG 1 BLK 1SPC: CPT | Performed by: OBSTETRICS & GYNECOLOGY

## 2018-09-12 PROCEDURE — 88300 SURGICAL PATH GROSS: CPT | Performed by: OBSTETRICS & GYNECOLOGY

## 2018-09-12 PROCEDURE — 58571 TLH W/T/O 250 G OR LESS: CPT | Mod: GC | Performed by: OBSTETRICS & GYNECOLOGY

## 2018-09-12 RX ORDER — ACETAMINOPHEN 500 MG
500-1000 TABLET ORAL ONCE
Status: DISCONTINUED | OUTPATIENT
Start: 2018-09-12 | End: 2018-09-12

## 2018-09-12 RX ORDER — CEFAZOLIN SODIUM 1 G/3ML
1 INJECTION, POWDER, FOR SOLUTION INTRAMUSCULAR; INTRAVENOUS SEE ADMIN INSTRUCTIONS
Status: DISCONTINUED | OUTPATIENT
Start: 2018-09-12 | End: 2018-09-12 | Stop reason: HOSPADM

## 2018-09-12 RX ORDER — ONDANSETRON 2 MG/ML
INJECTION INTRAMUSCULAR; INTRAVENOUS PRN
Status: DISCONTINUED | OUTPATIENT
Start: 2018-09-12 | End: 2018-09-12

## 2018-09-12 RX ORDER — LIDOCAINE HYDROCHLORIDE 20 MG/ML
INJECTION, SOLUTION INFILTRATION; PERINEURAL PRN
Status: DISCONTINUED | OUTPATIENT
Start: 2018-09-12 | End: 2018-09-12

## 2018-09-12 RX ORDER — PHENAZOPYRIDINE HYDROCHLORIDE 200 MG/1
200 TABLET, FILM COATED ORAL ONCE
Status: COMPLETED | OUTPATIENT
Start: 2018-09-12 | End: 2018-09-12

## 2018-09-12 RX ORDER — FENTANYL CITRATE 50 UG/ML
25-50 INJECTION, SOLUTION INTRAMUSCULAR; INTRAVENOUS EVERY 5 MIN PRN
Status: DISCONTINUED | OUTPATIENT
Start: 2018-09-12 | End: 2018-09-12 | Stop reason: HOSPADM

## 2018-09-12 RX ORDER — SODIUM CHLORIDE, SODIUM LACTATE, POTASSIUM CHLORIDE, CALCIUM CHLORIDE 600; 310; 30; 20 MG/100ML; MG/100ML; MG/100ML; MG/100ML
INJECTION, SOLUTION INTRAVENOUS CONTINUOUS
Status: DISCONTINUED | OUTPATIENT
Start: 2018-09-12 | End: 2018-09-12 | Stop reason: HOSPADM

## 2018-09-12 RX ORDER — LABETALOL HYDROCHLORIDE 5 MG/ML
5 INJECTION, SOLUTION INTRAVENOUS EVERY 10 MIN PRN
Status: DISCONTINUED | OUTPATIENT
Start: 2018-09-12 | End: 2018-09-12 | Stop reason: HOSPADM

## 2018-09-12 RX ORDER — SCOLOPAMINE TRANSDERMAL SYSTEM 1 MG/1
1 PATCH, EXTENDED RELEASE TRANSDERMAL
Status: CANCELLED | OUTPATIENT
Start: 2018-09-12

## 2018-09-12 RX ORDER — FENTANYL CITRATE 50 UG/ML
25-50 INJECTION, SOLUTION INTRAMUSCULAR; INTRAVENOUS
Status: DISCONTINUED | OUTPATIENT
Start: 2018-09-12 | End: 2018-09-12 | Stop reason: HOSPADM

## 2018-09-12 RX ORDER — ONDANSETRON 2 MG/ML
4 INJECTION INTRAMUSCULAR; INTRAVENOUS EVERY 30 MIN PRN
Status: DISCONTINUED | OUTPATIENT
Start: 2018-09-12 | End: 2018-09-12 | Stop reason: HOSPADM

## 2018-09-12 RX ORDER — PROPOFOL 10 MG/ML
INJECTION, EMULSION INTRAVENOUS PRN
Status: DISCONTINUED | OUTPATIENT
Start: 2018-09-12 | End: 2018-09-12

## 2018-09-12 RX ORDER — ONDANSETRON 4 MG/1
4 TABLET, ORALLY DISINTEGRATING ORAL EVERY 30 MIN PRN
Status: DISCONTINUED | OUTPATIENT
Start: 2018-09-12 | End: 2018-09-12 | Stop reason: HOSPADM

## 2018-09-12 RX ORDER — CEFAZOLIN SODIUM 2 G/100ML
2 INJECTION, SOLUTION INTRAVENOUS
Status: COMPLETED | OUTPATIENT
Start: 2018-09-12 | End: 2018-09-12

## 2018-09-12 RX ORDER — NALOXONE HYDROCHLORIDE 0.4 MG/ML
.1-.4 INJECTION, SOLUTION INTRAMUSCULAR; INTRAVENOUS; SUBCUTANEOUS
Status: DISCONTINUED | OUTPATIENT
Start: 2018-09-12 | End: 2018-09-12 | Stop reason: HOSPADM

## 2018-09-12 RX ORDER — IBUPROFEN 200 MG
600 TABLET ORAL
Status: DISCONTINUED | OUTPATIENT
Start: 2018-09-12 | End: 2018-09-12 | Stop reason: HOSPADM

## 2018-09-12 RX ORDER — HYDRALAZINE HYDROCHLORIDE 20 MG/ML
2.5-5 INJECTION INTRAMUSCULAR; INTRAVENOUS EVERY 10 MIN PRN
Status: DISCONTINUED | OUTPATIENT
Start: 2018-09-12 | End: 2018-09-12 | Stop reason: HOSPADM

## 2018-09-12 RX ORDER — MEPERIDINE HYDROCHLORIDE 50 MG/ML
12.5 INJECTION INTRAMUSCULAR; INTRAVENOUS; SUBCUTANEOUS
Status: DISCONTINUED | OUTPATIENT
Start: 2018-09-12 | End: 2018-09-12 | Stop reason: HOSPADM

## 2018-09-12 RX ORDER — ACETAMINOPHEN 500 MG
1000 TABLET ORAL ONCE
Status: COMPLETED | OUTPATIENT
Start: 2018-09-12 | End: 2018-09-12

## 2018-09-12 RX ORDER — IBUPROFEN 600 MG/1
600 TABLET, FILM COATED ORAL EVERY 6 HOURS PRN
Qty: 30 TABLET | Refills: 0 | Status: SHIPPED | OUTPATIENT
Start: 2018-09-12 | End: 2018-09-21

## 2018-09-12 RX ORDER — DEXAMETHASONE SODIUM PHOSPHATE 4 MG/ML
INJECTION, SOLUTION INTRA-ARTICULAR; INTRALESIONAL; INTRAMUSCULAR; INTRAVENOUS; SOFT TISSUE PRN
Status: DISCONTINUED | OUTPATIENT
Start: 2018-09-12 | End: 2018-09-12

## 2018-09-12 RX ORDER — ACETAMINOPHEN 325 MG/1
650 TABLET ORAL EVERY 4 HOURS PRN
Qty: 100 TABLET | Refills: 0 | Status: ON HOLD | OUTPATIENT
Start: 2018-09-12 | End: 2019-04-08

## 2018-09-12 RX ORDER — LABETALOL HYDROCHLORIDE 5 MG/ML
10 INJECTION, SOLUTION INTRAVENOUS
Status: DISCONTINUED | OUTPATIENT
Start: 2018-09-12 | End: 2018-09-12 | Stop reason: HOSPADM

## 2018-09-12 RX ORDER — FENTANYL CITRATE 50 UG/ML
INJECTION, SOLUTION INTRAMUSCULAR; INTRAVENOUS PRN
Status: DISCONTINUED | OUTPATIENT
Start: 2018-09-12 | End: 2018-09-12

## 2018-09-12 RX ORDER — PROPOFOL 10 MG/ML
INJECTION, EMULSION INTRAVENOUS CONTINUOUS PRN
Status: DISCONTINUED | OUTPATIENT
Start: 2018-09-12 | End: 2018-09-12

## 2018-09-12 RX ORDER — HEPARIN SODIUM 5000 [USP'U]/.5ML
5000 INJECTION, SOLUTION INTRAVENOUS; SUBCUTANEOUS
Status: COMPLETED | OUTPATIENT
Start: 2018-09-12 | End: 2018-09-12

## 2018-09-12 RX ORDER — GABAPENTIN 300 MG/1
300 CAPSULE ORAL ONCE
Status: COMPLETED | OUTPATIENT
Start: 2018-09-12 | End: 2018-09-12

## 2018-09-12 RX ORDER — HEPARIN SODIUM 1000 [USP'U]/ML
INJECTION, SOLUTION INTRAVENOUS; SUBCUTANEOUS PRN
Status: DISCONTINUED | OUTPATIENT
Start: 2018-09-12 | End: 2018-09-12 | Stop reason: HOSPADM

## 2018-09-12 RX ORDER — OXYCODONE HYDROCHLORIDE 10 MG/1
10 TABLET ORAL
Status: COMPLETED | OUTPATIENT
Start: 2018-09-12 | End: 2018-09-12

## 2018-09-12 RX ORDER — OXYCODONE HYDROCHLORIDE 5 MG/1
5-10 TABLET ORAL
Qty: 15 TABLET | Refills: 0 | Status: SHIPPED | OUTPATIENT
Start: 2018-09-12 | End: 2018-09-21

## 2018-09-12 RX ORDER — ONDANSETRON 4 MG/1
4 TABLET, ORALLY DISINTEGRATING ORAL
Status: DISCONTINUED | OUTPATIENT
Start: 2018-09-12 | End: 2018-09-12 | Stop reason: HOSPADM

## 2018-09-12 RX ORDER — GLYCOPYRROLATE 0.2 MG/ML
INJECTION, SOLUTION INTRAMUSCULAR; INTRAVENOUS PRN
Status: DISCONTINUED | OUTPATIENT
Start: 2018-09-12 | End: 2018-09-12

## 2018-09-12 RX ORDER — AMOXICILLIN 250 MG
1-2 CAPSULE ORAL 2 TIMES DAILY
Qty: 100 TABLET | Refills: 0 | Status: SHIPPED | OUTPATIENT
Start: 2018-09-12 | End: 2018-09-21

## 2018-09-12 RX ORDER — HYDROMORPHONE HYDROCHLORIDE 1 MG/ML
.3-.5 INJECTION, SOLUTION INTRAMUSCULAR; INTRAVENOUS; SUBCUTANEOUS EVERY 10 MIN PRN
Status: DISCONTINUED | OUTPATIENT
Start: 2018-09-12 | End: 2018-09-12 | Stop reason: HOSPADM

## 2018-09-12 RX ADMIN — PHENYLEPHRINE HYDROCHLORIDE 50 MCG: 10 INJECTION, SOLUTION INTRAMUSCULAR; INTRAVENOUS; SUBCUTANEOUS at 11:42

## 2018-09-12 RX ADMIN — PROPOFOL 10 MCG/KG/MIN: 10 INJECTION, EMULSION INTRAVENOUS at 12:08

## 2018-09-12 RX ADMIN — LIDOCAINE HYDROCHLORIDE 80 MG: 20 INJECTION, SOLUTION INFILTRATION; PERINEURAL at 11:38

## 2018-09-12 RX ADMIN — SODIUM CHLORIDE, POTASSIUM CHLORIDE, SODIUM LACTATE AND CALCIUM CHLORIDE: 600; 310; 30; 20 INJECTION, SOLUTION INTRAVENOUS at 11:18

## 2018-09-12 RX ADMIN — ROCURONIUM BROMIDE 50 MG: 10 INJECTION INTRAVENOUS at 11:38

## 2018-09-12 RX ADMIN — ONDANSETRON 4 MG: 2 INJECTION INTRAMUSCULAR; INTRAVENOUS at 12:19

## 2018-09-12 RX ADMIN — HEPARIN SODIUM 1000 UNITS: 1000 INJECTION, SOLUTION INTRAVENOUS; SUBCUTANEOUS at 12:31

## 2018-09-12 RX ADMIN — FENTANYL CITRATE 25 MCG: 50 INJECTION INTRAMUSCULAR; INTRAVENOUS at 13:45

## 2018-09-12 RX ADMIN — PHENYLEPHRINE HYDROCHLORIDE 100 MCG: 10 INJECTION, SOLUTION INTRAMUSCULAR; INTRAVENOUS; SUBCUTANEOUS at 12:07

## 2018-09-12 RX ADMIN — PHENYLEPHRINE HYDROCHLORIDE 50 MCG: 10 INJECTION, SOLUTION INTRAMUSCULAR; INTRAVENOUS; SUBCUTANEOUS at 12:45

## 2018-09-12 RX ADMIN — Medication 0.3 MG: at 13:35

## 2018-09-12 RX ADMIN — SUGAMMADEX 120 MG: 100 INJECTION, SOLUTION INTRAVENOUS at 12:54

## 2018-09-12 RX ADMIN — PHENYLEPHRINE HYDROCHLORIDE 100 MCG: 10 INJECTION, SOLUTION INTRAMUSCULAR; INTRAVENOUS; SUBCUTANEOUS at 12:49

## 2018-09-12 RX ADMIN — ACETAMINOPHEN 1000 MG: 500 TABLET, FILM COATED ORAL at 10:09

## 2018-09-12 RX ADMIN — PROPOFOL 100 MG: 10 INJECTION, EMULSION INTRAVENOUS at 11:38

## 2018-09-12 RX ADMIN — PHENAZOPYRIDINE HYDROCHLORIDE 200 MG: 200 TABLET, FILM COATED ORAL at 09:26

## 2018-09-12 RX ADMIN — PHENYLEPHRINE HYDROCHLORIDE 50 MCG: 10 INJECTION, SOLUTION INTRAMUSCULAR; INTRAVENOUS; SUBCUTANEOUS at 11:56

## 2018-09-12 RX ADMIN — HEPARIN SODIUM 5000 UNITS: 5000 INJECTION, SOLUTION INTRAVENOUS; SUBCUTANEOUS at 09:36

## 2018-09-12 RX ADMIN — CEFAZOLIN SODIUM 2 G: 2 INJECTION, SOLUTION INTRAVENOUS at 11:41

## 2018-09-12 RX ADMIN — OXYCODONE HYDROCHLORIDE 10 MG: 5 TABLET ORAL at 14:31

## 2018-09-12 RX ADMIN — GABAPENTIN 300 MG: 300 CAPSULE ORAL at 10:09

## 2018-09-12 RX ADMIN — PROPOFOL 10 MCG/KG/MIN: 10 INJECTION, EMULSION INTRAVENOUS at 12:19

## 2018-09-12 RX ADMIN — GLYCOPYRROLATE 0.2 MG: 0.2 INJECTION, SOLUTION INTRAMUSCULAR; INTRAVENOUS at 12:08

## 2018-09-12 RX ADMIN — HYDROMORPHONE HYDROCHLORIDE 0.3 MG: 1 INJECTION, SOLUTION INTRAMUSCULAR; INTRAVENOUS; SUBCUTANEOUS at 12:24

## 2018-09-12 RX ADMIN — MIDAZOLAM 2 MG: 1 INJECTION INTRAMUSCULAR; INTRAVENOUS at 11:11

## 2018-09-12 RX ADMIN — FENTANYL CITRATE 100 MCG: 50 INJECTION, SOLUTION INTRAMUSCULAR; INTRAVENOUS at 11:38

## 2018-09-12 RX ADMIN — Medication 0.3 MG: at 13:46

## 2018-09-12 RX ADMIN — DEXAMETHASONE SODIUM PHOSPHATE 4 MG: 4 INJECTION, SOLUTION INTRA-ARTICULAR; INTRALESIONAL; INTRAMUSCULAR; INTRAVENOUS; SOFT TISSUE at 11:41

## 2018-09-12 RX ADMIN — PHENYLEPHRINE HYDROCHLORIDE 100 MCG: 10 INJECTION, SOLUTION INTRAMUSCULAR; INTRAVENOUS; SUBCUTANEOUS at 11:38

## 2018-09-12 ASSESSMENT — PAIN DESCRIPTION - DESCRIPTORS: DESCRIPTORS: CRAMPING

## 2018-09-12 NOTE — OP NOTE
Gynecologic Oncology Operative Report    9/12/2018  Celeste Arguello  [id number]    PREOPERATIVE DIAGNOSIS: BRCA1 mutation positive    POSTOPERATIVE DIAGNOSIS: same, benign cyst on frozen section, final pathology pending    PROCEDURES: Total laparoscopic hysterectomy, bilateral salpingo-oophorectomy, cystoscopy    SURGEON: Marybeth Huizar MD     ASSISTANTS:  Stacy [place] MD, PGY-4.     ANESTHESIA: General endotracheal    ESTIMATED BLOOD LOSS: 20 cc     IV FLUIDS: 700 ml crystalloid    URINE OUTPUT: 120 ml clear urine     INDICATIONS: Celeste Arguello is a 33 year old who underwent genetic testing due to a strong family history of breast and ovarian cancer and her maternal cousin was found to be positive for BRCA1.  Her testing was also positive for a pathogenic mutation in BRCA1.  Following a thorough discussion of the risks, benefits, indications and alternatives she consented to the above procedure.    FINDINGS: On EUA the patient had normal external genitalia and a normal sized, mobile uterus.  On laparoscopy there was what appeared to be a simple 3 cm left ovarian cyst.  The right adnexa were grossly normal and the uterus was grossly normal.  There was some extra scarring on the right side bladder flap consistent with her previous ureteral reimplantation.  The remainder of the abdominal and pelvic surveys was unremarkable.  Frozen section results showed benign on frozen.  On cystoscopy there was no evidence of bladder injury or foreign body and there was brisk efflux from the bilateral ureteral orifices.    SPECIMENS:   1.  Pelvic washings  2.  Uterus, cervix, bilateral ovaries and fallopian tubes    COMPLICATIONS: None.     CONDITION: Stable to PACU.     PROCEDURE:   Consent was reviewed with the patient in the preoperative setting and confirmed. She received prophylactic antibiotics. In addition, she received heparin for venous thrombosis prevention. The patient was transferred to the operating  room and placed in dorsal supine position. General anesthetic was obtained in the usual manner without noted difficulties. The patient was then positioned onto Mook stirrups and an exam under anesthesia was performed with findings as described above.  The patient was prepped and draped for the above-mentioned procedure and Wallace catheter was then placed under sterile techniques.  Timeout was called at which point the patient's name, procedure and operative site was confirmed by the operative team. Initially, the instruments for the vaginal manipulator were positioned, a speculum was placed in the vagina. The anterior lip of the cervix was grasped, the uterus sounded to 6 cm and cervix was serially dilated. The IUD was removed and sent for gross pathology. The [employer] vaginal manipulator was then inserted and the vaginal balloon was then inserted to obtain intraabdominal pressure.     Attention was then turned to the upper abdomen. Initially, an incision was made at the umbilicus and the Veress needle introduced through this stab incision. The abdomen was insufflated with an opening pressure of 0 mmHg. The Veress needle was removed. The initial midline 5 mm port was inserted without difficulties and initial survey revealed no damage to underlying structures.  The left lateral 12 mm and right lateral 5 mm ports were then placed all under direct visualization without any injury noted to underlying structures. At this point, the patient was placed into steep Trendelenburg. The pelvis was inspected as well as the upper abdomen with findings as noted above. Pelvic washings were obtained and sent for cytology. Bowels were packed up into the upper abdomen with gentle traction.     Attention was then turned to the pelvis. The round ligaments were identified bilaterally. They were divided using cautery and the retroperitoneal space was entered by dissecting the peritoneum lateral and cephalad to the IP ligaments. The ureters  were identified and a defect was made underneath the IP ligament and above the ureter. The IP ligament was serially cauterized and then divided sharply. The rest of the peritoneum was skeletonized down to the level of the uterine arteries which were then cauterized and divided.  The bladder flap was created using cautery down to just below the level of the uterine manipulator cup. The rest of the parametrial tissue was dissected off of the uterus serially cauterized and divided sharply. A colpotomy was made on the anterior side and carried around to the posterior side of the uterine manipulator cup using the electrocautery. The uterus was removed through the vagina and sent for frozen section pathology which revealed benign findings.   The vaginal cuff was then closed using a V-Lock suture and the EndoStitch device with good hemostasis and reapproximation.   Next, we performed cystoscopy using 30-degree angled scope and noted normal bladder mucosa, no evidence of foreign body and brisk efflux from the bilateral ureteral orifices. At this point, the vaginal balloon was removed from the vagina. We closed the fascia on the 12 mm incision using the Ty-Cherry device. All laparoscopic instruments and ports were now removed and CO2 allowed to escape from the abdomen. Skin was reapproximated with 4-0 Vicryl sutures and Dermabond applied. The patient tolerated the procedure well and was taken to the recovery room in stable condition.    Sponge, lap and needle counts were reported as correct x2 and instrument count was correct x1.     Marybeth Huizar MD  Gynecologic Oncology  [facility]

## 2018-09-12 NOTE — DISCHARGE INSTRUCTIONS
[facility]  Same-Day Surgery   Adult Discharge Orders & Instructions     For 24 hours after surgery    1. Get plenty of rest.  A responsible adult must stay with you for at least 24 hours after you leave the hospital.   2. Do not drive or use heavy equipment.  If you have weakness or tingling, don't drive or use heavy equipment until this feeling goes away.  3. Do not drink alcohol.  4. Avoid strenuous or risky activities.  Ask for help when climbing stairs.   5. You may feel lightheaded.  IF so, sit for a few minutes before standing.  Have someone help you get up.   6. If you have nausea (feel sick to your stomach): Drink only clear liquids such as apple juice, ginger ale, broth or 7-Up.  Rest may also help.  Be sure to drink enough fluids.  Move to a regular diet as you feel able.  7. You may have a slight fever. Call the doctor if your fever is over 100 F (37.7 C) (taken under the tongue) or lasts longer than 24 hours.  8. You may have a dry mouth, a sore throat, muscle aches or trouble sleeping.  These should go away after 24 hours.  9. Do not make important or legal decisions.   Call your doctor for any of the followin.  Signs of infection (fever, growing tenderness at the surgery site, a large amount of drainage or bleeding, severe pain, foul-smelling drainage, redness, swelling).    2. It has been over 8 to 10 hours since surgery and you are still not able to urinate (pass water).    3.  Headache for over 24 hours.    To contact a doctor, call Dr Bhupendra King's office @ [phone] or:        [phone] and ask for the resident on call for GynOnc (answered 24 hours a day)      Emergency Department:    [facility]: [phone]       (TTY for hearing impaired: [phone])    [facility]: [phone]       (TTY for hearing impaired: [phone])

## 2018-09-12 NOTE — ANESTHESIA POSTPROCEDURE EVALUATION
Patient: Celeste Arguello    Procedure(s):  Laparoscopic Removal Of Uterus, Cervix, Both Ovaries And Fallopian Tubes, Cystoscopy  - Wound Class: II-Clean Contaminated   - Wound Class: II-Clean Contaminated   - Wound Class: II-Clean Contaminated    Diagnosis:BRCA Positive   Diagnosis Additional Information: No value filed.    Anesthesia Type:  General, ETT    Note:  Anesthesia Post Evaluation    Patient location during evaluation: PACU  Patient participation: Able to fully participate in evaluation  Level of consciousness: awake and alert  Pain management: satisfactory to patient  Airway patency: patent  Cardiovascular status: acceptable and stable  Respiratory status: acceptable and spontaneous ventilation  Hydration status: euvolemic  PONV: controlled     Anesthetic complications: None          Last vitals:  Vitals:    09/12/18 1400 09/12/18 1410 09/12/18 1425   BP: 101/66 101/63 104/64   Pulse:   63   Resp: 14 14 16   Temp:  36.8  C (98.3  F) 36.4  C (97.5  F)   SpO2: 100% 98% 100%         Electronically Signed By: Angelo Hernandez MD  September 12, 2018  4:55 PM

## 2018-09-12 NOTE — OR NURSING
Spoke to GYN/ONC resident and reported that patient is unable to void despite multiple attempts. Will bladder scan a second time and MD stated she will contact writer in 45 minutes.

## 2018-09-12 NOTE — ANESTHESIA CARE TRANSFER NOTE
Patient: Celeste Arguello    Procedure(s):  Laparoscopic Removal Of Uterus, Cervix, Both Ovaries And Fallopian Tubes, Cystoscopy  - Wound Class: II-Clean Contaminated   - Wound Class: II-Clean Contaminated   - Wound Class: II-Clean Contaminated    Diagnosis: BRCA Positive   Diagnosis Additional Information: No value filed.    Anesthesia Type:   General, ETT     Note:  Airway :Nasal Cannula  Patient transferred to:PACU  Comments:   Patient transferred to:PACU  Comments: Prior to extubation, patient was breathing spontaneously with appropriate respiratory rate and tidal volume. The patient was following commands, warm and demonstrated adequate strength. Patient was suctioned and extubated without complication.   Transported to PACU on 6L O2 .   VSS upon arrival to PACU.  Patient denies nausea or pain at this time.   Care transfer plan communicated and patient care transferred to PACU SUNDAR Elliott MD  Anesthesia Resident - [facility]  9/12/2018  1:26 PMHandoff Report: Identifed the Patient, Identified the Reponsible Provider, Reviewed the pertinent medical history, Discussed the surgical course, Reviewed Intra-OP anesthesia mangement and issues during anesthesia, Set expectations for post-procedure period and Allowed opportunity for questions and acknowledgement of understanding      Vitals: (Last set prior to Anesthesia Care Transfer)    CRNA VITALS  9/12/2018 1244 - 9/12/2018 1326      9/12/2018             Resp Rate (observed): (!)  3                Electronically Signed By: Coco Elliott MD  September 12, 2018  1:26 PM

## 2018-09-12 NOTE — IP AVS SNAPSHOT
Post Anesthesia Care Unit [address] [address]    Phone:  [phone]                                       After Visit Summary   9/12/2018    Celeste Arguello    MRN: [id number]           After Visit Summary Signature Page     I have received my discharge instructions, and my questions have been answered. I have discussed any challenges I see with this plan with the nurse or doctor.    ..........................................................................................................................................  Patient/Patient Representative Signature      ..........................................................................................................................................  Patient Representative Print Name and Relationship to Patient    ..................................................               ................................................  Date                                   Time    ..........................................................................................................................................  Reviewed by Signature/Title    ...................................................              ..............................................  Date                                               Time          22EPIC Rev 08/18

## 2018-09-12 NOTE — IP AVS SNAPSHOT
MRN:[id number]                      After Visit Summary   9/12/2018    Celeste Arguello    MRN: [id number]           Thank you!     Thank you for choosing [facility] for your care. Our goal is always to provide you with excellent care. Hearing back from our patients is one way we can continue to improve our services. Please take a few minutes to complete the written survey that you may receive in the mail after you visit with us. Thank you!        Patient Information     Date Of Birth          1985        About your hospital stay     You were admitted on:  September 12, 2018 You last received care in the:  Post Anesthesia Care Unit [facility]    You were discharged on:  September 12, 2018       Who to Call     For medical emergencies, please call 911.  For non-urgent questions about your medical care, please call your primary care provider or clinic, [phone]  For questions related to your surgery, please call your surgery clinic        Attending Provider     Provider Specialty    Marybeth Grande MD Gyn-Onc       Primary Care Provider Office Phone # Fax #    [facility] [phone]      After Care Instructions     Discharge Instructions       GENERAL POST-OPERATIVE  PATIENT INSTRUCTIONS      FOLLOW-UP:    Call Surgeon if you have:  Temperature greater than 100.4  Persistent nausea and vomiting  Severe uncontrolled pain  Redness, tenderness, or signs of infection (pain, swelling, redness, odor or green/yellow discharge around the site)  Difficulty breathing, headache or visual disturbances  Hives  Persistent dizziness or light-headedness  Extreme fatigue  Any other questions or concerns you may have after discharge    In an emergency, call 911 or go to an Emergency Department at a nearby hospital       WOUND CARE INSTRUCTIONS:  Keep a dry clean dressing on the wound if there is drainage. The initial bandage may be removed after 24 hours.  Once the  wound has quit draining you may leave it open to air.  If clothing rubs against the wound or causes irritation and the wound is not draining you may cover it with a dry dressing during the daytime.  Try to keep the wound dry and avoid ointments on the wound unless directed to do so.  If the wound becomes bright red and painful or starts to drain infected material that is not clear, please contact your physician immediately.    1.  You may shower 24 hrs after surgery   2.  No soaking in the tub        DIET:  There are no dietary restrictions.  You may eat any foods that you can tolerate.  It is a good idea to eat a high fiber diet and take in plenty of fluids to prevent constipation.  If you do become constipated you may want to take a mild laxative or take ducolax tablets on a daily basis until your bowel habits are regular.  Constipation can be very uncomfortable, along with straining, after recent surgery.    ACTIVITY:  You are encouraged to cough and deep breath or use your incentive spirometer if you were given one, every 15-30 minutes when awake.  This will help prevent respiratory complications and low grade fevers post-operatively if you had a general anesthetic.  You may want to hug a pillow when coughing and sneezing to add additional support to the surgical area, if you had abdominal or chest surgery, which will decrease pain during these times.       1.  No heavy lifting >10-15lbs or strenuous exercise for six-eight weeks.  No exercise in which you are using core muscles (yoga, pilates, swimming, weight lifting)   2.  You may walk as much as you wish.  You are encouraged to increase your   activity each day after surgery.  Stairs are okay.    3.  Nothing per vagina for eight weeks.  No tampons, no intercourse, no douching.  You can expect some light vaginal spotting and discharge for up to six weeks.  If bleeding becomes heavy, please contact the office.     MEDICATIONS:  Try to take narcotic medications  and anti-inflammatory medications, such as tylenol, ibuprofen, naprosyn, etc., with food.  This will minimize stomach upset from the medication.  Should you develop nausea and vomiting from the pain medication, or develop a rash, please discontinue the medication and contact your physician.  You should not drive, make important decisions, or operate machinery when taking narcotic pain medication.    OTHER:  Patients are often constipated after general anesthesia and surgery.  The patient should continue to take stool softeners (for example, Senokot-S) for the next six weeks and consume adequate amounts of water.  If the patient remains constipated or unable to pass stool, please try one or all of the following measures:  1.  Milk of Magnesia 30cc twice a day as needed by mouth  2.  Metamucil 2 tablespoons in 12 ounces of fluid  3.  Dulcolax oral or suppositories  4.  Prunes or prune juice  5.  Miralax daily      QUESTIONS:  Please feel free to call your physician or the hospital  if you have any questions, and they will be glad to assist you.            Discharge Instructions       Resume pre procedure diet            Ice to affected area       PRN as tolerated            No alcohol       NO ALCOHOL for 24 hours post procedure            No driving or operating machinery       No driving or operating machinery until two weeks after procedure when you are able to turn from right to left without restriction and slam on the break.                  Your next 10 appointments already scheduled     Sep 21, 2018  8:00 AM CDT   Post Op with Marybeth King MD   [facility] ([facility])    [id number] [place] [id number] [phone]            Dec 19, 2018  1:00 PM CST   MR BREAST BILATERAL W/O & W CONTRAST with UCMR1   [facility] ([facility])    [address]  1st Floor  [facility] [address]    [phone]           Take your medicines as usual, unless your doctor tells you not to. Bring a list of your current medicines to your exam (including vitamins, minerals and over-the-counter drugs).  The timing of your exam may depend on the start of your last period. If you re in menopause, you may have the exam anytime.  Please bring any previous mammograms or breast MRIs from other facilities to the MRI dept. Do not mail these items to us.   You will have IV contrast for this exam.  You do not need to do anything special to prepare.  The MRI machine uses a strong magnet. Please wear clothes without metal (snaps, zippers). A sweatsuit works well, or we may give you a hospital gown.  Please remove any body piercings and hair extensions before you arrive. You will also remove watches, jewelry, hairpins, wallets, dentures, partial dental plates and hearing aids. You may wear contact lenses, and you may be able to wear your rings. We have a safe place to keep your personal items, but it is safer to leave them at home.  **IMPORTANT** THE INSTRUCTIONS BELOW ARE ONLY FOR THOSE PATIENTS WHO HAVE BEEN PRESCRIBED SEDATION OR GENERAL ANESTHESIA DURING THEIR MRI PROCEDURE:  IF YOUR DOCTOR PRESCRIBED ORAL SEDATION (take medicine to help you relax during your exam):   You must get the medicine from your doctor (oral medication) before you arrive. Bring the medicine to the exam. Do not take it at home. You ll be told when to take it upon arriving for your exam.   Arrive one hour early. Bring someone who can take you home after the test. Your medicine will make you sleepy. After the exam, you may not drive, take a bus or take a taxi by yourself.  IF YOUR DOCTOR PRESCRIBED IV SEDATION:   Arrive one hour early. Bring someone who can take you home after the test. Your medicine will make you sleepy. After the exam, you may not drive, take a bus or take a taxi by yourself.   No eating 6 hours before your exam. You may have clear  liquids up until 4 hours before your exam. (Clear liquids include water, clear tea, black coffee and fruit juice without pulp.)  IF YOUR DOCTOR PRESCRIBED ANESTHESIA (be asleep for your exam):   Arrive 1 1/2 hours early. Bring someone who can take you home after the test. You may not drive, take a bus or take a taxi by yourself.   No eating 8 hours before your exam. You may have clear liquids up until 4 hours before your exam. (Clear liquids include water, clear tea, black coffee and fruit juice without pulp.)   You will spend four to five hours in the recovery room.  If you have any questions, please contact your Imaging Department exam site.            Jun 21, 2019  1:00 PM CDT   [facility] Draw with  [facility] LAB DRAW   [facility] ([facility])    [address] [place] [address]  [address] [place] [id number]   [phone]            Jun 21, 2019  1:30 PM CDT   (Arrive by 1:15 PM)   Return Visit with ENRIQUE Cat   [facility]    [address] [place] [address]  [address] [place] [address]   [phone]              Further instructions from your care team       [facility] [place]  Same-Day Surgery   Adult Discharge Orders & Instructions     For 24 hours after surgery    1. Get plenty of rest.  A responsible adult must stay with you for at least 24 hours after you leave the hospital.   2. Do not drive or use heavy equipment.  If you have weakness or tingling, don't drive or use heavy equipment until this feeling goes away.  3. Do not drink alcohol.  4. Avoid strenuous or risky activities.  Ask for help when climbing stairs.   5. You may feel lightheaded.  IF so, sit for a few minutes before standing.  Have someone help you get up.   6. If you have nausea (feel sick to your stomach): Drink only clear liquids such as apple juice, ginger ale, broth or 7-Up.  Rest may also  "help.  Be sure to drink enough fluids.  Move to a regular diet as you feel able.  7. You may have a slight fever. Call the doctor if your fever is over 100 F (37.7 C) (taken under the tongue) or lasts longer than 24 hours.  8. You may have a dry mouth, a sore throat, muscle aches or trouble sleeping.  These should go away after 24 hours.  9. Do not make important or legal decisions.   Call your doctor for any of the followin.  Signs of infection (fever, growing tenderness at the surgery site, a large amount of drainage or bleeding, severe pain, foul-smelling drainage, redness, swelling).    2. It has been over 8 to 10 hours since surgery and you are still not able to urinate (pass water).    3.  Headache for over 24 hours.    To contact a doctor, call Dr Bhupendra King's office @ [phone] or:        [phone] and ask for the resident on call for GynOnc (answered 24 hours a day)      Emergency Department:    [facility]: [phone]       (TTY for hearing impaired: [phone])    [facility]: [phone]       (TTY for hearing impaired: [phone])        Additional Information     If you use hormonal birth control (such as the pill, patch, ring or implants): You'll need a second form of birth control for 7 days (condoms, a diaphragm or contraceptive foam). While in the hospital, you received a medicine called Bridion. Your normal birth control will not work as well for a week after taking this medicine.          Pending Results     Date and Time Order Name Status Description    2018 1208 Cytology non gyn In process     2018 1202 Surgical pathology exam Preliminary             Admission Information     Date & Time Provider Department Dept. Phone    2018 Marybeth Grande MD Post Anesthesia Care Unit [facility] [phone]      Your Vitals Were     Blood Pressure Pulse Temperature Respirations Height Weight    101/63 70 98.3  F (36.8  C) (Oral) 14 1.549 m (5' 1\") " 50.5 kg (111 lb 5.3 oz)    Pulse Oximetry BMI (Body Mass Index)                98% 21.04 kg/m2          [employer] Information     [employer] gives you secure access to your electronic health record. If you see a primary care provider, you can also send messages to your care team and make appointments. If you have questions, please call your primary care clinic.  If you do not have a primary care provider, please call [phone] and they will assist you.        Care EveryWhere ID     This is your Care EveryWhere ID. This could be used by other organizations to access your [facility] medical records  [id number]        Equal Access to Services     [facility] : Hadpapito Clay, alton polk, lamont hobbs, edgardo ramey . So [facility] [place] [phone].    ATENCIÓN: Si habla español, tiene a de jesus disposición servicios gratuitos de asistencia lingüística. Llame al [phone].    We comply with applicable federal civil rights laws and [place] laws. We do not discriminate on the basis of race, color, national origin, age, disability, sex, sexual orientation, or gender identity.               Review of your medicines      START taking        Dose / Directions    acetaminophen 325 MG tablet   Commonly known as:  TYLENOL   Used for:  S/P laparoscopic hysterectomy        Dose:  650 mg   Take 2 tablets (650 mg) by mouth every 4 hours as needed for other (mild pain)   Quantity:  100 tablet   Refills:  0       ibuprofen 600 MG tablet   Commonly known as:  ADVIL/MOTRIN   Used for:  S/P laparoscopic hysterectomy        Dose:  600 mg   Take 1 tablet (600 mg) by mouth every 6 hours as needed for pain (mild)   Quantity:  30 tablet   Refills:  0       oxyCODONE IR 5 MG tablet   Commonly known as:  ROXICODONE   Used for:  S/P laparoscopic hysterectomy        Dose:  5-10 mg   Take 1-2 tablets (5-10 mg) by mouth every 3 hours as needed for pain or other (Moderate to Severe)   Quantity:  15  tablet   Refills:  0       senna-docusate 8.6-50 MG per tablet   Commonly known as:  SENOKOT-S;PERICOLACE   Used for:  S/P laparoscopic hysterectomy        Dose:  1-2 tablet   Take 1-2 tablets by mouth 2 times daily Take while on oral narcotics to prevent or treat constipation.   Quantity:  100 tablet   Refills:  0         CONTINUE these medicines which have NOT CHANGED        Dose / Directions    citalopram 20 MG tablet   Commonly known as:  celeXA   Used for:  Adjustment disorder with anxious mood        Dose:  20 mg   Take 1 tablet (20 mg) by mouth daily   Quantity:  90 tablet   Refills:  4       ketoconazole 2 % shampoo   Commonly known as:  NIZORAL        LATHER IN SCALP FOR 5 MINUTES PRIOR TO RINSING. USE EVERY OTHER DAY FOR 1 WEEK.   Refills:  0       meclizine 25 MG tablet   Commonly known as:  ANTIVERT        Dose:  25 mg   Take 25 mg by mouth 3 times daily as needed   Refills:  0       nicotine polacrilex 2 MG gum   Commonly known as:  NICORETTE   Used for:  Encounter for smoking cessation counseling        Dose:  2 mg   Place 1 each (2 mg) inside cheek as needed for smoking cessation   Quantity:  150 tablet   Refills:  3       ondansetron 4 MG ODT tab   Commonly known as:  ZOFRAN-ODT        Dose:  4 mg   Take 4 mg by mouth every 12 hours as needed   Refills:  0         STOP taking     MIRENA (52 MG) 20 MCG/24HR IUD   Generic drug:  levonorgestrel           norgestimate-ethinyl estradiol 0.25-35 MG-MCG per tablet   Commonly known as:  ORTHO-CYCLEN, SPRINTEC                Where to get your medicines      These medications were sent to [facility] [place], [place] - [address]  [address] [address]     Phone:  [phone]     acetaminophen 325 MG tablet    ibuprofen 600 MG tablet    senna-docusate 8.6-50 MG per tablet         Some of these will need a paper prescription and others can be bought over the counter. Ask your nurse if you have questions.     Bring  a paper prescription for each of these medications     oxyCODONE IR 5 MG tablet                Protect others around you: Learn how to safely use, store and throw away your medicines at www.disposemymeds.org.        Information about OPIOIDS     PRESCRIPTION OPIOIDS: WHAT YOU NEED TO KNOW   We gave you an opioid (narcotic) pain medicine. It is important to manage your pain, but opioids are not always the best choice. You should first try all the other options your care team gave you. Take this medicine for as short a time (and as few doses) as possible.    Some activities can increase your pain, such as bandage changes or therapy sessions. It may help to take your pain medicine 30 to 60 minutes before these activities. Reduce your stress by getting enough sleep, working on hobbies you enjoy and practicing relaxation or meditation. Talk to your care team about ways to manage your pain beyond prescription opioids.    These medicines have risks:    DO NOT drive when on new or higher doses of pain medicine. These medicines can affect your alertness and reaction times, and you could be arrested for driving under the influence (DUI). If you need to use opioids long-term, talk to your care team about driving.    DO NOT operate heavy machinery    DO NOT do any other dangerous activities while taking these medicines.    DO NOT drink any alcohol while taking these medicines.     If the opioid prescribed includes acetaminophen, DO NOT take with any other medicines that contain acetaminophen. Read all labels carefully. Look for the word  acetaminophen  or  Tylenol.  Ask your pharmacist if you have questions or are unsure.    You can get addicted to pain medicines, especially if you have a history of addiction (chemical, alcohol or substance dependence). Talk to your care team about ways to reduce this risk.    All opioids tend to cause constipation. Drink plenty of water and eat foods that have a lot of fiber, such as fruits,  vegetables, prune juice, apple juice and high-fiber cereal. Take a laxative (Miralax, milk of magnesia, Colace, Senna) if you don t move your bowels at least every other day. Other side effects include upset stomach, sleepiness, dizziness, throwing up, tolerance (needing more of the medicine to have the same effect), physical dependence and slowed breathing.    Store your pills in a secure place, locked if possible. We will not replace any lost or stolen medicine. If you don t finish your medicine, please throw away (dispose) as directed by your pharmacist. The [place] Pollution Control Agency has more information about safe disposal: https://www.pca.[facility].[place].us/living-green/managing-unwanted-medications             Medication List: This is a list of all your medications and when to take them. Check marks below indicate your daily home schedule. Keep this list as a reference.      Medications           Morning Afternoon Evening Bedtime As Needed    acetaminophen 325 MG tablet   Commonly known as:  TYLENOL   Take 2 tablets (650 mg) by mouth every 4 hours as needed for other (mild pain)   Last time this was given:  1,000 mg on 9/12/2018 10:09 AM                                citalopram 20 MG tablet   Commonly known as:  celeXA   Take 1 tablet (20 mg) by mouth daily                                ibuprofen 600 MG tablet   Commonly known as:  ADVIL/MOTRIN   Take 1 tablet (600 mg) by mouth every 6 hours as needed for pain (mild)                                ketoconazole 2 % shampoo   Commonly known as:  NIZORAL   LATHER IN SCALP FOR 5 MINUTES PRIOR TO RINSING. USE EVERY OTHER DAY FOR 1 WEEK.                                meclizine 25 MG tablet   Commonly known as:  ANTIVERT   Take 25 mg by mouth 3 times daily as needed                                nicotine polacrilex 2 MG gum   Commonly known as:  NICORETTE   Place 1 each (2 mg) inside cheek as needed for smoking cessation                                 ondansetron 4 MG ODT tab   Commonly known as:  ZOFRAN-ODT   Take 4 mg by mouth every 12 hours as needed                                oxyCODONE IR 5 MG tablet   Commonly known as:  ROXICODONE   Take 1-2 tablets (5-10 mg) by mouth every 3 hours as needed for pain or other (Moderate to Severe)                                senna-docusate 8.6-50 MG per tablet   Commonly known as:  SENOKOT-S;PERICOLACE   Take 1-2 tablets by mouth 2 times daily Take while on oral narcotics to prevent or treat constipation.

## 2018-09-12 NOTE — BRIEF OP NOTE
[facility], [place]    Brief Operative Note    Pre-operative diagnosis: BRCA Positive   Post-operative diagnosis * No post-op diagnosis entered *  Procedure: Procedure(s):  Laparoscopic Removal Of Uterus, Cervix, Both Ovaries And Fallopian Tubes, Cystoscopy  - Wound Class: II-Clean Contaminated   - Wound Class: II-Clean Contaminated   - Wound Class: II-Clean Contaminated  Surgeon: Surgeon(s) and Role:     * Marybeth Grande MD - Primary     * Stacy Everett MD - Resident - Assisting     * Nydia Sanders MD - Resident - Assisting  Anesthesia: General   Estimated blood loss: 20  IVF: 700ml  UOP: 150ml  Drains: none  Specimens:   ID Type Source Tests Collected by Time Destination   1 : pelvic washings Washings Pelvis CYTOLOGY NON GYN Marybeth Grande MD 9/12/2018 12:08 PM    2 : venous blood Blood, venous Blood OR DOCUMENTATION ONLY Marybeth Grande MD 9/12/2018 12:12 PM    A : IUD Other (specify in comments) Other SURGICAL PATHOLOGY EXAM Marybeth Grande MD 9/12/2018 12:02 PM    B : UTERUS, CERVIX, BILATERAL FALLOPIAN TUBES, BILATERAL OVARIES Organ Uterus SURGICAL PATHOLOGY EXAM Marybeth Grande MD 9/12/2018 12:27 PM      Findings:  On exam Mirena IUD strings visible.  Intact mirena IUD removed. On laparoscopy- normal appearing liver, normal appearing stomach, normal appearing uterus.  Normal appearing bilateral fallopian tubes, normal appearing right ovary.  Left ovary with 3cm simple appearing cyst.  On cysto- bilateral ureteral orifices with efflux observed.  Bilateral ureteral orifices appear slightly scarred.  Left ureteral orifice on the left lateral and inferior portion of the trigone.   Complications: None.  Implants: None.    Stacy Everett Md  OBGYN PGY4

## 2018-09-12 NOTE — OR NURSING
Spoke to GYN/ONC patient and reported that patient is unable to void yet despite receiving a reported 1700 mL in OR and PACU.

## 2018-09-13 LAB — COPATH REPORT: NORMAL

## 2018-09-13 NOTE — PROGRESS NOTES
Gynecologic Oncology Postoperative Check Note  9/12/2018    S: Patient reports she is doing well postoperatively. Pain is well controlled with oral pain medications. Ambulating without pain. Initially having trouble voiding, but was able to urinate 200cc. Tolerating crackers and water without nausea or vomiting. Denies chest pain, shortness of breath, dizziness, or other concerns at this time.     O:  Vitals:    09/12/18 1345 09/12/18 1400 09/12/18 1410 09/12/18 1425   BP: 101/68 101/66 101/63 104/64   BP Location:    Right arm   Pulse:    63   Resp: 14 14 14 16   Temp: 98  F (36.7  C)  98.3  F (36.8  C) 97.5  F (36.4  C)   TempSrc: Oral  Oral Oral   SpO2: 100% 100% 98% 100%   Weight:       Height:           Gen: NAD  Cardio: RRR, S1/S2, no murmurs  Resp: CTAB, no wheezing or crackles  Abdomen: soft, appropriately tender, incisions covered with dermabond  Extremities: Non-tender, no edema    A/P: 33 year old POD#0 s/p TLH, BSO, cysto. Doing well.    Dz: BRCA1 mutation positive  FEN: Advance as tolerated  Pain: tylenol, ibuprofen, oxycodone PRN  GI: bowel regimen  : voiding spontaneously, discussed return precautions for urinary retention    Dispo: To home    Follow-up appointment:  9/21/2018 with Dr. Bhupendra Barrgaan MD  Gynecology Oncology PGY2  9/12/2018 7:42 PM

## 2018-09-14 ENCOUNTER — TELEPHONE (OUTPATIENT)
Dept: ONCOLOGY | Facility: CLINIC | Age: 33
End: 2018-09-14

## 2018-09-14 NOTE — TELEPHONE ENCOUNTER
Post-Discharge Phone Call:    Pain:  1) Location: Brief sharp shooting pain in lower abdomen  2) Rate pain on scale 1-10: 5/10  3) Is your pain well controlled on your pain medication?:Yes. Tried to go without Oxycodone but did take 1 today. Otherwise taking Tylenol and ibuprofen alternating q4hrs  4) How often are you taking your pain medication?: q 4hrs    GI:  5) Last bowel movement: prior to surgery. Taking 2 senna/day. Will increase to 2 BID  6) Are you having regular bowel movements? No  7) Eating/drinking well?: Yes  8) Nausea?: No    Urinary:  9) Are you having problems or difficulty with urination? No      Lower Extremities:  10) Were lymph nodes removed during surgery?  N/A  11) If yes, have you been offered a lymphedema consult appointment?  N/A  12) Any pain, redness, or swelling in legs?  No  13) Any area on your legs that are warm to touch? No  14) Chest pain or severe shortness of breath? No    Wound:  15) Type of incision: laparoscopic  Any of the following:  - Drainage (color, amount): No  - Odor: No  - Redness: No  - Chills: No  - Fever: No  16) Staples - Have you had your staples out yet? (staples should be removed 7-10 days post-op): N/A  17) Pt was reminded to wash incision (allow warm, soapy water to run over incision): Yes    Post-op:  18) Verify date and time of appointment: 9/21/18 0800  19) Pt was informed that pathology will be discussed at this appointment Yes    Any other questions or concerns at this time? Patient reports mild itching on her face yesterday. Thought it may have been from oxycodone. Advised could try benadryl if itching returns and to seek medical attention if she has any difficulty breathing.  Bernadette Pop RN  BSN

## 2018-09-14 NOTE — TELEPHONE ENCOUNTER
Attempted to reach patient for post op follow up call. Left vm with call back number for writer.  Bernadette Pop  RN, BSN, OCN

## 2018-09-15 ENCOUNTER — TELEPHONE (OUTPATIENT)
Dept: ONCOLOGY | Facility: CLINIC | Age: 33
End: 2018-09-15

## 2018-09-15 NOTE — TELEPHONE ENCOUNTER
Telephone Note      Alerted by Co-resident Charisse Goff ([place] [facility] a patient call that was incorrectly routed to her pager.  Call was regarding recent post-operative patient of Dr. Huizar's.  Dr. Goff states that page written as patient concern for rash on stomach, call back number [phone].    Called Celeste Arguello immediately.  Call answered and patient identification verified.    Vinicio Arguello is a 33 year old POD#3, s/p TLH, BSO, cysto for BRCA 1 mutation.  Patient reports that overall she has been feeling well after surgery.  She denies any fever, chills, nausea, vomiting, abdominal tenderness, chest pain, SOB, itchy throat, incisional drainage, or increasing incisional pain, constipation, or dysuria.  She states that the reason she is calling is because she has a new onset of rash.  She describes the rash as tiny red dots, raised, itchy, and distributed between just below her breast to just above her pubic bone and from the mid point of her right and left sides (not her back).  She first noticed the rash this afternoon at about noon.  She has not had a rash like this before.  She does describe a brief period of facial itching yesterday without rash, but this has since resolved.  She took her temperature and she has not been febrile.  She has been taking tylenol and ibuprofen for pain with PRN oxycodone.  Took 2 tabs of oxycodone yesterday and her most recent tablet was last night at 10pm.  She states that she has taken one shower since her procedure.  She denies any changes in her laundry detergent and denies wearing any tight clothes or abdominal binder.     I explained to her that given the distribution and timing of the rash this could be an atopic dermatitis likely from the skin prep from surgery vs. A allergic drug rash.  Given the distribution and the fact that she has only taken one shower since surgery I think it is more likely skin irritation and reaction from  the skin prep.  It is less likely a reaction to oxycodone since she has been taking it since her procedure and this is the first time she has had this rash.      I recommended that she take benadryl for itching relief and monitor the rash.  I explained that she can take a shower and use gentle non-scented soap in the distribution of the rash in an attempt to rid of any residual prep.  Then she may use non-scented gentle lotion avoiding the incision areas for itch relief.  I encouraged her to remain in a temperature controlled setting due to heat advisory in the area at this time.  I also recommended if the symptoms seem to worsen with the use of oxycodone, then discontinue oxycodone.  The patient was instructed to call back and review symptoms again if the rash spreads beyond its current distribution.  I explained that if she were to have other concerning symptoms such as increased pain, fever, shortness of breath, chest pain, or vaginal bleeding she should either call and come in or go to the ED for evaluation.      Stacy Everett MD  OBGYN PGY4

## 2018-09-18 LAB — COPATH REPORT: NORMAL

## 2018-09-20 NOTE — PROGRESS NOTES
Consult Notes on Referred Patient        Dr. Miesha Flowers, APRN CNS  [address] [place] [address]       RE: Celeste Arguello  : 1985  KELLY: 2018    HPI:  Celeste Arguello is 33 year old with BRCA1 mutation.  She is accompanied today by her boyfriend.  She is doing well post-operatively.  Eating and drinking normally.  Normal bowel and bladder function.  She did develop a rash, most likely a reaction to the surgical prep which is now resolved.  She is having no hot flashes/night sweats.    Clinical Course:    She underwent genetic testing because her maternal cousin was found to have a mutation and she has a strong family history of breast and ovarian cancer.    2018 - POSITIVE for a BRCA1 mutation. Specifically her mutation is called c.181T>G (p.C61G)    18:  US Pelvis:  The uterus measures 6.9 x 4.7 x 2.8 cm, and there is no evidence of a focal fibroid.  The endometrium is within normal limits and measures 3 mm. There is no free fluid in the pelvis. Intrauterine device in good position.  The right ovary measures 2.2 x 2.7 x 3.2 cm. There is 2.2 x 2.5 x 2.5 cm complex cyst within the right ovary, newly appreciated since . The left ovary is partially obscured by overlying bowel gas but is not enlarged when visualized in the transverse plane, measuring 1.5 x 1.8 cm. There is normal blood flow to the ovaries.     18:  Total laparoscopic hysterectomy, bilateral salpingo-oophorectomy, cystoscopy   Pathology:  benign      Review of Systems:  Systemic           no weight changes; no fever; no chills; no night sweats; no appetite changes  Skin           no rashes, or lesions  Eye           no irritation; no changes in vision  Esteban-Laryngeal           no dysphagia; no hoarseness   Pulmonary    no cough; no shortness of breath  Cardiovascular    no chest pain; no palpitations  Gastrointestinal    no diarrhea; no constipation; + abdominal pain; no changes in bowel   habits; no blood in stool  Genitourinary   no urinary frequency; no urinary urgency; no dysuria; no pain; no abnormal vaginal discharge; no abnormal vaginal bleeding; + dyspareunia  Breast    no breast discharge; no breast changes; no breast pain  Musculoskeletal    no myalgias; no arthralgias; + back pain  Psychiatric           + depressed mood; + mood changes; + anxiety; + nervousness    Hematologic            no tender lymph nodes; no noticeable swellings or lumps   Endocrine    no hot flashes; no heat/cold intolerance         Neurological   no tremor; no numbness and [id number]; no headaches; no difficulty sleeping    Obstetrics and Gynecology History:  ,  x 1  She does not desire future fertility      Past Medical History:  Past Medical History:   Diagnosis Date     BRCA1 gene mutation positive 6/15/2018    BRCA1 c.181T>G (p.C61G) tested at [facility] 2018       Past Surgical History:  Past Surgical History:   Procedure Laterality Date     CYSTOSCOPY N/A 2018    Procedure: CYSTOSCOPY;;  Surgeon: Marybeth Grande MD;  Location: [facility] OR     HYSTERECTOMY TOTAL ABDOMINAL, BILATERAL SALPINGO-OOPHORECTOMY, NODE DISSECTION, COMBINED Bilateral 2018    Procedure: COMBINED HYSTERECTOMY TOTAL ABDOMINAL, SALPINGO-OOPHORECTOMY, NODE DISSECTION;;  Surgeon: Marybeth Grande MD;  Location: [facility] OR     LAPAROSCOPIC HYSTERECTOMY TOTAL, BILATERAL SALPINGO-OOPHORECTOMY, NODE DISSECTION, COMBINED N/A 2018    Procedure: COMBINED LAPAROSCOPIC HYSTERECTOMY TOTAL, SALPINGO-OOPHORECTOMY, NODE DISSECTION;  Laparoscopic Removal Of Uterus, Cervix, Both Ovaries And Fallopian Tubes, Cystoscopy ;  Surgeon: Marybeth Grande MD;  Location: [facility] OR     LAPAROSCOPY DIAGNOSTIC (GYN)  2006    endometriosis     urethral reimplant  age 6       Health Maintenance:  Last Pap Smear: No need for further pap smear exams  She has not had a history of abnormal Pap smears.    Last Mammogram: 18               Result: normal      She has not had a history of abnormal mammograms.    Last Colonoscopy: N/A      Current Medications:   has a current medication list which includes the following prescription(s): estrogens (conjugated), acetaminophen, citalopram, ketoconazole, meclizine, nicotine polacrilex, and ondansetron.     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 One    How much calcium per day? Dairy products       How much caffeine per day? 1 cup coffee and 2 can of coke    How much vitamin D per day? None    Do you/your family wear seatbelts?  Yes    Do you/your family use safety helmets? yes    Do you/your family use sunscreen? Yes    Do you/your family keep firearms in the home? No    Do you/your family have a smoke detector(s)? Yes        Do you feel safe in your home? Yes    Has anyone ever touched you in an unwanted manner? No     Explain     SEE GLADYS [facility]     06/16/2014    Beronica Dumas [facility] 04/26/18               Lives with daughter who is 9, feels safe at home.  Works as substance abuse counselor.  Enjoys writing, photography, reading.  Does not have an advanced directive on file and would like her friend, Olga to be her POA.  Full code    Family  History:   The patient's family history is significant for.  Family History   Problem Relation Age of Onset     Ovarian Cancer Mother 38      age 47     Hereditary Breast and Ovarian Cancer Syndrome Maternal Aunt 50     Breast cancer gene -- MEHRDAD/BSO and mastectomy     Pancreatic Cancer Maternal Uncle 38     Alcohol/Drug Brother      active     Alcohol/Drug Brother      recovering     Psychotic Disorder Brother      depression and ADHD     Breast Cancer Maternal Aunt 35     & MC age 36     Uterine Cancer Maternal Aunt      C.A.D. No family hx of      Diabetes No family hx of      Hypertension No family hx of      Coronary Artery Disease No family hx of      Cerebrovascular Disease No family hx of      Hyperlipidemia No family hx of      Colon Cancer No family hx of      Prostate Cancer No family hx of      Thyroid Disease No family hx of          Physical Exam:   /75  Pulse 78  Temp 97.7  F (36.5  C) (Oral)  Wt 52.1 kg (114 lb 14.4 oz)  SpO2 98%  BMI 21.71 kg/m2  Body mass index is 21.71 kg/(m^2).    General Appearance: healthy and alert, no distress     Gastrointestinal:       abdomen soft, non-tender, non-distended, no organomegaly or masses, port sites without erythema/induration or drainage    Genitourinary: External genitalia appear normal.  The vaginal cuff is intact and healing well      Assessment:    Celeste Arguello is a 33 year old woman with a diagnosis of BRCA1.     A total of 30 minutes was spent with the patient, >50% of which were spent in counseling the patient and/or treatment planning, this is separate from the 5 minutes spent on post-operative cares.      Plan:     1.)    BRCA1 mutation.  Pathology was reviewed with the patient and she was provided with a copy of her results.  Given the benign findings she no longer requires follow up in the gynecologic oncology clinic.  She also no longer requires screening pap smear exams.  She is planning to undergo bilateral mastectomy and  would like to see Dr Bhatia of plastic surgery for discussion of reconstruction options for a second opinion and referral was placed today.  She will continue to follow with Fran Muser to ensure she is getting appropriate breast cancer screening.      2.) Surgical menopause.  We discussed the risks and benefits of hormone replacement therapy given her young age in light of her BRCA mutation.  We discussed that we do not have much data on the safety of estrogen only therapy in young women with BRCA in terms of their breast cancer risk but that there are large population based studies showing that estrogen only therapy did not increase breast cancer risk while combined HRT did and given that she has had a hysterectomy she would only need ERT.  We discussed the benefits to her cardiovascular and bone health as well as the risks.  She has quit smoking which will decrease her risks significantly.  Even though she is not having significant menopausal symptoms, I believe given her young age it is reasonable to use ERT.  We will use the lowest dose and plan to continue until at least the age 41 or 46.  Prescription was sent to the pharmacy for a year supply of ERT.      3.) Genetic risk factors were assessed and the patient has a known BRCA1 mutation.      4.) Labs and/or tests ordered include:  None     5.) Health maintenance issues addressed today include pt is up to date.             Thank you for allowing us to participate in the care of your patient.         Sincerely,    Marybeth Huizar MD  Gynecologic Oncology  [facility]       KIKI ACOSTA

## 2018-09-21 ENCOUNTER — ONCOLOGY VISIT (OUTPATIENT)
Dept: ONCOLOGY | Facility: CLINIC | Age: 33
End: 2018-09-21
Payer: COMMERCIAL

## 2018-09-21 VITALS
HEART RATE: 78 BPM | BODY MASS INDEX: 21.71 KG/M2 | SYSTOLIC BLOOD PRESSURE: 111 MMHG | DIASTOLIC BLOOD PRESSURE: 75 MMHG | WEIGHT: 114.9 LBS | TEMPERATURE: 97.7 F | OXYGEN SATURATION: 98 %

## 2018-09-21 DIAGNOSIS — Z15.01 BRCA1 POSITIVE: ICD-10-CM

## 2018-09-21 DIAGNOSIS — Z15.09 BRCA1 POSITIVE: ICD-10-CM

## 2018-09-21 DIAGNOSIS — E89.40 SURGICAL MENOPAUSE: Primary | ICD-10-CM

## 2018-09-21 PROCEDURE — 99214 OFFICE O/P EST MOD 30 MIN: CPT | Mod: 24 | Performed by: OBSTETRICS & GYNECOLOGY

## 2018-09-21 ASSESSMENT — PAIN SCALES - GENERAL: PAINLEVEL: MODERATE PAIN (4)

## 2018-09-21 NOTE — LETTER
2018         RE: Celeste Arguello  [address]  [address]  [facility] [address]        Dear Colleague,    Thank you for referring your patient, Celeste Arguello, to the [facility]. Please see a copy of my visit note below.    Consult Notes on Referred Patient        Dr. Miesha Flowers, APRN CNS  [address] [place] [address]       RE: Celeste Arguello  : 1985  KELLY: 2018    HPI:  Celeste Arguello is 33 year old with BRCA1 mutation.  She is accompanied today by her boyfriend.  She is doing well post-operatively.  Eating and drinking normally.  Normal bowel and bladder function.  She did develop a rash, most likely a reaction to the surgical prep which is now resolved.  She is having no hot flashes/night sweats.    Clinical Course:    She underwent genetic testing because her maternal cousin was found to have a mutation and she has a strong family history of breast and ovarian cancer.    2018 - POSITIVE for a BRCA1 mutation. Specifically her mutation is called c.181T>G (p.C61G)    18:  US Pelvis:  The uterus measures 6.9 x 4.7 x 2.8 cm, and there is no evidence of a focal fibroid.  The endometrium is within normal limits and measures 3 mm. There is no free fluid in the pelvis. Intrauterine device in good position.  The right ovary measures 2.2 x 2.7 x 3.2 cm. There is 2.2 x 2.5 x 2.5 cm complex cyst within the right ovary, newly appreciated since . The left ovary is partially obscured by overlying bowel gas but is not enlarged when visualized in the transverse plane, measuring 1.5 x 1.8 cm. There is normal blood flow to the ovaries.     18:  Total laparoscopic hysterectomy, bilateral salpingo-oophorectomy, cystoscopy   Pathology:  benign      Review of Systems:  Systemic           no weight changes; no fever; no chills; no night sweats; no appetite changes  Skin           no rashes, or lesions  Eye           no irritation;  no changes in vision  Esteban-Laryngeal           no dysphagia; no hoarseness   Pulmonary    no cough; no shortness of breath  Cardiovascular    no chest pain; no palpitations  Gastrointestinal    no diarrhea; no constipation; + abdominal pain; no changes in bowel  habits; no blood in stool  Genitourinary   no urinary frequency; no urinary urgency; no dysuria; no pain; no abnormal vaginal discharge; no abnormal vaginal bleeding; + dyspareunia  Breast    no breast discharge; no breast changes; no breast pain  Musculoskeletal    no myalgias; no arthralgias; + back pain  Psychiatric           + depressed mood; + mood changes; + anxiety; + nervousness    Hematologic            no tender lymph nodes; no noticeable swellings or lumps   Endocrine    no hot flashes; no heat/cold intolerance         Neurological   no tremor; no numbness and [id number]; no headaches; no difficulty sleeping    Obstetrics and Gynecology History:  ,  x 1  She does not desire future fertility      Past Medical History:  Past Medical History:   Diagnosis Date     BRCA1 gene mutation positive 6/15/2018    BRCA1 c.181T>G (p.C61G) tested at [facility] 2018       Past Surgical History:  Past Surgical History:   Procedure Laterality Date     CYSTOSCOPY N/A 2018    Procedure: CYSTOSCOPY;;  Surgeon: Marybeth Grande MD;  Location: [facility] OR     HYSTERECTOMY TOTAL ABDOMINAL, BILATERAL SALPINGO-OOPHORECTOMY, NODE DISSECTION, COMBINED Bilateral 2018    Procedure: COMBINED HYSTERECTOMY TOTAL ABDOMINAL, SALPINGO-OOPHORECTOMY, NODE DISSECTION;;  Surgeon: Marybeth Grande MD;  Location: [facility] OR     LAPAROSCOPIC HYSTERECTOMY TOTAL, BILATERAL SALPINGO-OOPHORECTOMY, NODE DISSECTION, COMBINED N/A 2018    Procedure: COMBINED LAPAROSCOPIC HYSTERECTOMY TOTAL, SALPINGO-OOPHORECTOMY, NODE DISSECTION;  Laparoscopic Removal Of Uterus, Cervix, Both Ovaries And Fallopian Tubes, Cystoscopy ;  Surgeon: Marybeth Grande MD;  Location:  [facility] OR     LAPAROSCOPY DIAGNOSTIC (GYN)  2006    endometriosis     urethral reimplant  age 6       Health Maintenance:  Last Pap Smear: No need for further pap smear exams  She has not had a history of abnormal Pap smears.    Last Mammogram: 7/2/18              Result: normal      She has not had a history of abnormal mammograms.    Last Colonoscopy: N/A      Current Medications:   has a current medication list which includes the following prescription(s): estrogens (conjugated), acetaminophen, citalopram, ketoconazole, meclizine, nicotine polacrilex, and ondansetron.     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 One    How much calcium per day? Dairy products       How much caffeine per day? 1 cup coffee and 2 can of coke    How much vitamin D per day? None    Do you/your family wear seatbelts?  Yes    Do you/your family use safety helmets? yes    Do you/your family use sunscreen? Yes    Do you/your family keep firearms in the home? No    Do you/your family have a smoke detector(s)? Yes        Do you feel safe in your home? Yes    Has anyone ever touched you in an unwanted manner? No     Explain     SEE KIMBERLY GRAHAM     06/16/2014    Beronica Dumas CMA  19               Lives with daughter who is 9, feels safe at home.  Works as substance abuse counselor.  Enjoys writing, photography, reading.  Does not have an advanced directive on file and would like her friend, Olga to be her POA.  Full code    Family History:   The patient's family history is significant for.  Family History   Problem Relation Age of Onset     Ovarian Cancer Mother 38      age 47     Hereditary Breast and Ovarian Cancer Syndrome Maternal Aunt 50     Breast cancer gene -- MEHRDAD/BSO and mastectomy     Pancreatic Cancer Maternal Uncle 38     Alcohol/Drug Brother      active     Alcohol/Drug Brother      recovering     Psychotic Disorder Brother      depression and ADHD     Breast Cancer Maternal Aunt 35     & MC age 36     Uterine Cancer Maternal Aunt      C.A.D. No family hx of      Diabetes No family hx of      Hypertension No family hx of      Coronary Artery Disease No family hx of      Cerebrovascular Disease No family hx of      Hyperlipidemia No family hx of      Colon Cancer No family hx of      Prostate Cancer No family hx of      Thyroid Disease No family hx of          Physical Exam:   /75  Pulse 78  Temp 97.7  F (36.5  C) (Oral)  Wt 52.1 kg (114 lb 14.4 oz)  SpO2 98%  BMI 21.71 kg/m2  Body mass index is 21.71 kg/(m^2).    General Appearance: healthy and alert, no distress     Gastrointestinal:       abdomen soft, non-tender, non-distended, no organomegaly or masses, port sites without erythema/induration or drainage    Genitourinary: External genitalia appear normal.  The vaginal cuff is intact and healing well      Assessment:    Celeste Arguello is a 33 year old woman with a diagnosis of BRCA1.     A total of 30 minutes was spent with the patient, >50% of which were spent in counseling the patient and/or treatment planning, this is separate from the 5 minutes spent on post-operative cares.      Plan:     1.)    BRCA1 mutation.  Pathology was reviewed with the  patient and she was provided with a copy of her results.  Given the benign findings she no longer requires follow up in the gynecologic oncology clinic.  She also no longer requires screening pap smear exams.  She is planning to undergo bilateral mastectomy and would like to see Dr Bhatia of plastic surgery for discussion of reconstruction options for a second opinion and referral was placed today.  She will continue to follow with Fran Musedelia to ensure she is getting appropriate breast cancer screening.      2.) Surgical menopause.  We discussed the risks and benefits of hormone replacement therapy given her young age in light of her BRCA mutation.  We discussed that we do not have much data on the safety of estrogen only therapy in young women with BRCA in terms of their breast cancer risk but that there are large population based studies showing that estrogen only therapy did not increase breast cancer risk while combined HRT did and given that she has had a hysterectomy she would only need ERT.  We discussed the benefits to her cardiovascular and bone health as well as the risks.  She has quit smoking which will decrease her risks significantly.  Even though she is not having significant menopausal symptoms, I believe given her young age it is reasonable to use ERT.  We will use the lowest dose and plan to continue until at least the age 41 or 46.  Prescription was sent to the pharmacy for a year supply of ERT.      3.) Genetic risk factors were assessed and the patient has a known BRCA1 mutation.      4.) Labs and/or tests ordered include:  None     5.) Health maintenance issues addressed today include pt is up to date.             Thank you for allowing us to participate in the care of your patient.         Sincerely,    Marybeth Huizar MD  Gynecologic Oncology  [facility]       KIKI ACOSTA

## 2018-09-21 NOTE — MR AVS SNAPSHOT
After Visit Summary   9/21/2018    Celeste Arguello    MRN: [id number]           Patient Information     Date Of Birth          1985        Visit Information        Provider Department      9/21/2018 8:00 AM Marybeth Grande MD [facility]        Today's Diagnoses     Surgical menopause    -  1    BRCA1 positive          Care Instructions    Visit with Dr Bhatia of plastic surgery at the [place]    No need for further visits with Dr Huizar unless problems arise          Follow-ups after your visit        Your next 10 appointments already scheduled     Dec 19, 2018  1:00 PM CST   MR BREAST BILATERAL W/O & W CONTRAST with [facility] MRI ([facility])    [address]  [facility] [address] [phone]           How do I prepare for my exam? (Food and drink instructions) **If you will be receiving sedation or general anesthesia, please see special notes below.**  How do I prepare for my exam? (Other instructions) Take your medicines as usual, unless your doctor tells you not to. The timing of your exam may depend on the start of your last period. If you re in menopause, you may have the exam anytime.  You will have IV contrast for this exam.  You do not need to do anything special to prepare. **If you will be receiving sedation or general anesthesia, please see special notes below.**  What should I wear:  The MRI machine uses a strong magnet. Please wear clothes without metal (snaps, zippers). A sweatsuit works well, or we may give you a hospital gown. Please remove any body piercings and hair extensions before you arrive. You will also remove watches, jewelry, hairpins, wallets, dentures, partial dental plates and hearing aids. You may wear contact lenses, and you may be able to wear your rings. We have a safe place to keep your personal items, but it is safer to leave them at home.  How long  does the exam take:  Most tests take 30 to 60 minutes.  HOWEVER, IF YOUR DOCTOR PRESCRIBES ANESTHESIA please plan on spending four to five hours in the recovery room.  What should I bring: Bring a list of your current medicines to your exam (including vitamins, minerals and over-the-counter drugs). Please bring any previous mammograms or breast MRIs from other facilities to the MRI dept. Do not mail these items to us.  Do I need a : **If you will be receiving sedation or general anesthesia, please see special notes below.**  What should I do after the exam: No Restrictions, You may resume normal activities.  What is this test: MRI (magnetic resonance imaging) uses a strong magnet and radio waves to look inside the body. An MRA (magnetic resonance angiogram) does the same thing, but it lets us look at your blood vessels. A computer turns the radio waves into pictures showing cross sections of the body, much like slices of bread. This helps us see any problems more clearly. You may receive fluid (called  contrast ) before or during your scan. The fluid helps us see the pictures better. We give the fluid through an IV (small needle in your arm).  Who should I call with questions: If you have any questions, please contact your Imaging Department exam site. Directions, parking instructions, and other information is available on our website, [email or web address].  How do I prepare if I m having sedation or anesthesia? **IMPORTANT** THE INSTRUCTIONS BELOW ARE ONLY FOR THOSE PATIENTS WHO HAVE BEEN TOLD THEY WILL RECEIVE SEDATION OR GENERAL ANESTHESIA DURING THEIR MRI PROCEDURE:  IF YOU WILL RECEIVE SEDATION (take medicine to help you relax during your exam) You must get the medicine from your doctor before you arrive. Bring the medicine to the exam. Do not take it at home. Arrive one hour early. Bring someone who can take you home after the test. Your medicine will make you sleepy. After the exam, you may not drive,  take a bus or take a taxi by yourself. No eating 8 hours before your exam. You may have clear liquids up until 4 hours before your exam. (Clear liquids include water, clear tea, black coffee and fruit juice without pulp.)  IF YOU WILL RECEIVE ANESTHESIA (be asleep for your exam) Arrive 1 1/2 hours early. Bring someone who can take you home after the test. You may not drive, take a bus or take a taxi by yourself. No eating 8 hours before your exam. You may have clear liquids up until 4 hours before your exam. (Clear liquids include water, clear tea, black coffee and fruit juice without pulp.)            Jun 21, 2019  1:00 PM CDT   [employer] Lab Draw with  [employer] LAB DRAW   [facility] Lab Draw ([facility])    [address] [place] [address]  [address] [place] [id number] [phone]            Jun 21, 2019  1:30 PM CDT   (Arrive by 1:15 PM)   Return Visit with ENRIQUE Cat   [facility] Clinic ([facility])    [address] [place] [address]  [address] [place] [id number] [phone]              Who to contact     If you have questions or need follow up information about today's clinic visit or your schedule please contact [facility] directly at [phone].  Normal or non-critical lab and imaging results will be communicated to you by [employer], letter or phone within 4 business days after the clinic has received the results. If you do not hear from us within 7 days, please contact the clinic through [employer] or phone. If you have a critical or abnormal lab result, we will notify you by phone as soon as possible.  Submit refill requests through [employer] or call your pharmacy and they will forward the refill request to us. Please allow 3 business days for your refill to be completed.          Additional Information About Your Visit        [employer] Information     [employer] gives you secure access to your electronic  health record. If you see a primary care provider, you can also send messages to your care team and make appointments. If you have questions, please call your primary care clinic.  If you do not have a primary care provider, please call [phone] and they will assist you.      [employer] is an electronic gateway that provides easy, online access to your medical records. With [employer], you can request a clinic appointment, read your test results, renew a prescription or communicate with your care team.     To access your existing account, please contact your [facility] or call [phone] for assistance.        Care EveryWhere ID     This is your Care EveryWhere ID. This could be used by other organizations to access your [facility] medical records  [id number]        Your Vitals Were     Pulse Temperature Pulse Oximetry BMI (Body Mass Index)          78 97.7  F (36.5  C) (Oral) 98% 21.71 kg/m2         Blood Pressure from Last 3 Encounters:   09/21/18 111/75   09/12/18 91/53   08/24/18 115/75    Weight from Last 3 Encounters:   09/21/18 52.1 kg (114 lb 14.4 oz)   09/12/18 50.5 kg (111 lb 5.3 oz)   08/24/18 52.2 kg (115 lb)              Today, you had the following     No orders found for display         Today's Medication Changes          These changes are accurate as of 9/21/18 11:59 PM.  If you have any questions, ask your nurse or doctor.               Start taking these medicines.        Dose/Directions    estrogens (conjugated) 0.3 MG tablet   Commonly known as:  PREMARIN   Used for:  Surgical menopause, BRCA1 positive   Started by:  Marybeth Grande MD        Dose:  0.3 mg   Take 1 tablet (0.3 mg) by mouth daily   Quantity:  90 tablet   Refills:  4         Stop taking these medicines if you haven't already. Please contact your care team if you have questions.     ibuprofen 600 MG tablet   Commonly known as:  ADVIL/MOTRIN   Stopped by:  Marybeth Grande MD            oxyCODONE IR 5 MG tablet   Commonly known as:  ROXICODONE   Stopped by:  Marybeth Grande MD           senna-docusate 8.6-50 MG per tablet   Commonly known as:  SENOKOT-S;PERICOLACE   Stopped by:  Marybeth Grande MD                Where to get your medicines      These medications were sent to [facility] [id number] [facility] - [place], [place] - [address] [facility]  [address] [facility] [address]     Phone:  [phone]     estrogens (conjugated) 0.3 MG tablet                Primary Care Provider Office Phone # Fax #    [facility] [phone] [address] [facility] [address]        Equal Access to Services     MUMTAZ ABURTO : Hadii aad ku hadasho Soomaali, waaxda luqadaha, qaybta kaalmada adeegyada, edgardo thomas. So [facility] [phone].    ATENCIÓN: Si habla español, tiene a de jesus disposición servicios gratuitos de asistencia lingüística. [facility] [phone].    We comply with applicable federal civil rights laws and [place] laws. We do not discriminate on the basis of race, color, national origin, age, disability, sex, sexual orientation, or gender identity.            Thank you!     Thank you for choosing [facility]  for your care. Our goal is always to provide you with excellent care. Hearing back from our patients is one way we can continue to improve our services. Please take a few minutes to complete the written survey that you may receive in the mail after your visit with us. Thank you!             Your Updated Medication List - Protect others around you: Learn how to safely use, store and throw away your medicines at www.disposemymeds.org.          This list is accurate as of 9/21/18 11:59 PM.  Always use your most recent med list.                   Brand Name Dispense Instructions for use Diagnosis    acetaminophen 325 MG tablet    TYLENOL    100 tablet    Take 2 tablets (650 mg) by mouth every 4  hours as needed for other (mild pain)    S/P laparoscopic hysterectomy       citalopram 20 MG tablet    celeXA    90 tablet    Take 1 tablet (20 mg) by mouth daily    Adjustment disorder with anxious mood       estrogens (conjugated) 0.3 MG tablet    PREMARIN    90 tablet    Take 1 tablet (0.3 mg) by mouth daily    Surgical menopause, BRCA1 positive       ketoconazole 2 % shampoo    NIZORAL     LATHER IN SCALP FOR 5 MINUTES PRIOR TO RINSING. USE EVERY OTHER DAY FOR 1 WEEK.        meclizine 25 MG tablet    ANTIVERT     Take 25 mg by mouth 3 times daily as needed        nicotine polacrilex 2 MG gum    NICORETTE    150 tablet    Place 1 each (2 mg) inside cheek as needed for smoking cessation    Encounter for smoking cessation counseling       ondansetron 4 MG ODT tab    ZOFRAN-ODT     Take 4 mg by mouth every 12 hours as needed

## 2018-09-21 NOTE — NURSING NOTE
"Oncology Rooming Note    September 21, 2018 8:27 AM   Celeste Arguello is a 33 year old female who presents for:    Chief Complaint   Patient presents with     Oncology Clinic Visit     post op     Initial Vitals: /75  Pulse 78  Temp 97.7  F (36.5  C) (Oral)  Wt 52.1 kg (114 lb 14.4 oz)  SpO2 98%  BMI 21.71 kg/m2 Estimated body mass index is 21.71 kg/(m^2) as calculated from the following:    Height as of 9/12/18: 1.549 m (5' 1\").    Weight as of this encounter: 52.1 kg (114 lb 14.4 oz). Body surface area is 1.5 meters squared.  Moderate Pain (4) Comment: takes ibuprofen with relief   No LMP recorded. Patient is not currently having periods (Reason: IUD).  Allergies reviewed: Yes  Medications reviewed: Yes    Medications: Medication refills not needed today.  Pharmacy name entered into [employer]:    [facility] [address] [id number] [address] [address] [place] [address]    Clinical concerns: none Dr. Huizar was notified.    5 minutes for nursing intake (face to face time)     Brien Agee RN              "

## 2018-09-21 NOTE — PATIENT INSTRUCTIONS
Visit with Dr Bhatia of plastic surgery at the [place]    No need for further visits with Dr Huizar unless problems arise

## 2018-09-27 ENCOUNTER — TELEPHONE (OUTPATIENT)
Dept: ONCOLOGY | Facility: CLINIC | Age: 33
End: 2018-09-27

## 2018-09-27 NOTE — TELEPHONE ENCOUNTER
Received telephone call from patient, reporting symptoms she feels may be related to a vaginal yeast infection.  Patient reports having bothersome vaginal itching/irritation, along with foul odor (but no drainage), for the past couple of days.  Per patient, with past vaginal yeast infections, she would use OTC Monistat suppositories - however, she realizes she was advised nothing in the vagina for 8 weeks after surgery.  Patient is asking what Dr. Huizar would advise for her.  Writer reviewed with Dr. Huizar, who advises patient is ok to try OTC Monistat, but should use caution when placing it.  Patient was notified of this.  Also reviewed with patient to report any increased pain or heavy bleeding.  Patient verbalizes understanding, and will call with any further questions or concerns.          Jose L Bower, RN, BSN, OCN  Oncology Care Coordinator  [facility]

## 2018-10-04 ENCOUNTER — TELEPHONE (OUTPATIENT)
Dept: ONCOLOGY | Facility: CLINIC | Age: 33
End: 2018-10-04

## 2018-10-04 NOTE — TELEPHONE ENCOUNTER
Received call from patient stating she noticed some blood on the toilet tissue two days ago and yesterday. Both times were in the morning. She used Monostat and did notice a tiny bit of blood on applicator. Denies further spotting today or foul smelling discharge. Discussed with patient spotting can continue for up to 6-8 weeks post surgery. Encouraged to call if she has foul smelling discharge or bleeding increases where she is changing a pad an hour. Pt expressed understanding and denies further questions.  Bernadette Pop  RN, BSN, OCN

## 2018-11-30 ENCOUNTER — OFFICE VISIT (OUTPATIENT)
Dept: OBGYN | Facility: CLINIC | Age: 33
End: 2018-11-30
Attending: ADVANCED PRACTICE MIDWIFE
Payer: COMMERCIAL

## 2018-11-30 ENCOUNTER — HOSPITAL ENCOUNTER (INPATIENT)
Facility: CLINIC | Age: 33
Setting detail: SURGERY ADMIT
End: 2018-11-30
Attending: SURGERY | Admitting: SURGERY
Payer: COMMERCIAL

## 2018-11-30 VITALS
WEIGHT: 117.3 LBS | SYSTOLIC BLOOD PRESSURE: 103 MMHG | BODY MASS INDEX: 22.15 KG/M2 | DIASTOLIC BLOOD PRESSURE: 69 MMHG | HEIGHT: 61 IN | HEART RATE: 66 BPM

## 2018-11-30 DIAGNOSIS — N76.0 BACTERIAL VAGINOSIS: Primary | ICD-10-CM

## 2018-11-30 DIAGNOSIS — E89.40 SURGICAL MENOPAUSE: ICD-10-CM

## 2018-11-30 DIAGNOSIS — B96.89 BACTERIAL VAGINOSIS: Primary | ICD-10-CM

## 2018-11-30 LAB
CLUE CELLS: POSITIVE
TRICHOMONAS (WET PREP): NEGATIVE
YEAST (WET PREP): NEGATIVE

## 2018-11-30 PROCEDURE — G0463 HOSPITAL OUTPT CLINIC VISIT: HCPCS | Mod: ZF

## 2018-11-30 PROCEDURE — 87210 SMEAR WET MOUNT SALINE/INK: CPT | Mod: ZF | Performed by: ADVANCED PRACTICE MIDWIFE

## 2018-11-30 RX ORDER — METRONIDAZOLE 500 MG/1
500 TABLET ORAL 2 TIMES DAILY
Qty: 14 TABLET | Refills: 0 | Status: SHIPPED | OUTPATIENT
Start: 2018-11-30 | End: 2019-03-25

## 2018-11-30 NOTE — MR AVS SNAPSHOT
After Visit Summary   11/30/2018    Celeste Arguello    MRN: [id number]           Patient Information     Date Of Birth          1985        Visit Information        Provider Department      11/30/2018 9:00 AM Feliciano Perez APRN CNM [facility]        Today's Diagnoses     Bacterial vaginosis    -  1    Surgical menopause           Follow-ups after your visit        Follow-up notes from your care team     Return if symptoms worsen or fail to improve.      Your next 10 appointments already scheduled     Dec 04, 2018 10:00 AM CST   CONSULT with Roseann Boyd MD   [facility]    [address]  [facility] [address]   [phone]            Dec 19, 2018  1:00 PM CST   MR BREAST BILATERAL W/O & W CONTRAST with [address] [facility] ([facility])    [address]  [facility] [id number] [phone]           How do I prepare for my exam? (Food and drink instructions) **If you will be receiving sedation or general anesthesia, please see special notes below.**  How do I prepare for my exam? (Other instructions) Take your medicines as usual, unless your doctor tells you not to. The timing of your exam may depend on the start of your last period. If you re in menopause, you may have the exam anytime.  You will have IV contrast for this exam.  You do not need to do anything special to prepare. **If you will be receiving sedation or general anesthesia, please see special notes below.**  What should I wear:  The MRI machine uses a strong magnet. Please wear clothes without metal (snaps, zippers). A sweatsuit works well, or we may give you a hospital gown. Please remove any body piercings and hair extensions before you arrive. You will also remove watches, jewelry, hairpins, wallets, dentures, partial dental plates and hearing aids. You may wear contact  lenses, and you may be able to wear your rings. We have a safe place to keep your personal items, but it is safer to leave them at home.  How long does the exam take:  Most tests take 30 to 60 minutes.  HOWEVER, IF YOUR DOCTOR PRESCRIBES ANESTHESIA please plan on spending four to five hours in the recovery room.  What should I bring: Bring a list of your current medicines to your exam (including vitamins, minerals and over-the-counter drugs). Please bring any previous mammograms or breast MRIs from other facilities to the MRI dept. Do not mail these items to us.  Do I need a : **If you will be receiving sedation or general anesthesia, please see special notes below.**  What should I do after the exam: No Restrictions, You may resume normal activities.  What is this test: MRI (magnetic resonance imaging) uses a strong magnet and radio waves to look inside the body. An MRA (magnetic resonance angiogram) does the same thing, but it lets us look at your blood vessels. A computer turns the radio waves into pictures showing cross sections of the body, much like slices of bread. This helps us see any problems more clearly. You may receive fluid (called  contrast ) before or during your scan. The fluid helps us see the pictures better. We give the fluid through an IV (small needle in your arm).  Who should I call with questions: If you have any questions, please contact your Imaging Department exam site. Directions, parking instructions, and other information is available on our website, [email or web address].  How do I prepare if I m having sedation or anesthesia? **IMPORTANT** THE INSTRUCTIONS BELOW ARE ONLY FOR THOSE PATIENTS WHO HAVE BEEN TOLD THEY WILL RECEIVE SEDATION OR GENERAL ANESTHESIA DURING THEIR MRI PROCEDURE:  IF YOU WILL RECEIVE SEDATION (take medicine to help you relax during your exam) You must get the medicine from your doctor before you arrive. Bring the medicine to the exam. Do not take it at home.  Arrive one hour early. Bring someone who can take you home after the test. Your medicine will make you sleepy. After the exam, you may not drive, take a bus or take a taxi by yourself. No eating 8 hours before your exam. You may have clear liquids up until 4 hours before your exam. (Clear liquids include water, clear tea, black coffee and fruit juice without pulp.)  IF YOU WILL RECEIVE ANESTHESIA (be asleep for your exam) Arrive 1 1/2 hours early. Bring someone who can take you home after the test. You may not drive, take a bus or take a taxi by yourself. No eating 8 hours before your exam. You may have clear liquids up until 4 hours before your exam. (Clear liquids include water, clear tea, black coffee and fruit juice without pulp.)            Scot 10, 2019   Procedure with Sheridan Dudley MD   [facility], [place], Same Day Surgery (--)    [address] [facility] [address]   [phone]            Jun 21, 2019  1:00 PM CDT   [facility] Lab Draw with  [facility] DRAW   [facility] Lab Draw ([facility])    [address] [place] [address]  [address]  [facility] [id number] [phone]            Jun 21, 2019  1:30 PM CDT   (Arrive by 1:15 PM)   Return Visit with ENRIQUE Cat   [facility] ([facility])    [address] [place] [address]  [address]  [facility] [id number] [phone]              Who to contact     Please call your clinic at [phone] to:    Ask questions about your health    Make or cancel appointments    Discuss your medicines    Learn about your test results    Speak to your doctor            Additional Information About Your Visit        [facility] Information     [facility] gives you secure access to your electronic health record. If you see a primary care provider, you can also send messages to your care team and make appointments. If you have questions, please call your primary care clinic.  If you do not have a  "primary care provider, please call [phone] and they will assist you.      [employer] is an electronic gateway that provides easy, online access to your medical records. With [employer], you can request a clinic appointment, read your test results, renew a prescription or communicate with your care team.     To access your existing account, please contact your [facility] or call [phone] for assistance.        Care EveryWhere ID     This is your Care EveryWhere ID. This could be used by other organizations to access your [facility] medical records  [id number]        Your Vitals Were     Pulse Height Last Period BMI (Body Mass Index)          66 1.549 m (5' 1\") 12/17/2016 22.16 kg/m2         Blood Pressure from Last 3 Encounters:   11/30/18 103/69   09/21/18 111/75   09/12/18 91/53    Weight from Last 3 Encounters:   11/30/18 53.2 kg (117 lb 4.8 oz)   09/21/18 52.1 kg (114 lb 14.4 oz)   09/12/18 50.5 kg (111 lb 5.3 oz)              We Performed the Following     Wet Prep (Collected in Clinic)     Wet Prep POCT          Today's Medication Changes          These changes are accurate as of 11/30/18  1:22 PM.  If you have any questions, ask your nurse or doctor.               Start taking these medicines.        Dose/Directions    metroNIDAZOLE 500 MG tablet   Commonly known as:  FLAGYL   Used for:  Bacterial vaginosis        Dose:  500 mg   Take 1 tablet (500 mg) by mouth 2 times daily   Quantity:  14 tablet   Refills:  0         These medicines have changed or have updated prescriptions.        Dose/Directions    * estrogen conj 0.3 MG tablet   Commonly known as:  PREMARIN   This may have changed:  Another medication with the same name was added. Make sure you understand how and when to take each.   Used for:  Surgical menopause, BRCA1 positive        Dose:  0.3 mg   Take 1 tablet (0.3 mg) by mouth daily   Quantity:  90 tablet   Refills:  4       * estrogen conj 0.3 MG tablet   Commonly " known as:  PREMARIN   This may have changed:  You were already taking a medication with the same name, and this prescription was added. Make sure you understand how and when to take each.   Used for:  Surgical menopause        Dose:  0.3 mg   Take 1 tablet (0.3 mg) by mouth daily   Quantity:  90 tablet   Refills:  3       * Notice:  This list has 2 medication(s) that are the same as other medications prescribed for you. Read the directions carefully, and ask your doctor or other care provider to review them with you.         Where to get your medicines      These medications were sent to [id number] - [place], [place] - [address] [facility]  [address] [facility] [address]     Phone:  [phone]     estrogen conj 0.3 MG tablet    metroNIDAZOLE 500 MG tablet                Primary Care Provider Office Phone # Fax #    [facility] [phone] [address] [facility] [address]        Equal Access to Services     MUMTAZ ABURTO AH: Hadii aad ku hadasho Soomaali, waaxda luqadaha, qaybta kaalmada adeegyada, edgardo ramey . So [facility] [phone].    ATENCIÓN: Si escobar thomas, tiene a de jesus disposición servicios gratuitos de asistencia lingüística. Llame al [phone].    We comply with applicable federal civil rights laws and [place] laws. We do not discriminate on the basis of race, color, national origin, age, disability, sex, sexual orientation, or gender identity.            Thank you!     Thank you for choosing [facility]  for your care. Our goal is always to provide you with excellent care. Hearing back from our patients is one way we can continue to improve our services. Please take a few minutes to complete the written survey that you may receive in the mail after your visit with us. Thank you!             Your Updated Medication List - Protect others around you: Learn how to safely use, store and throw away  your medicines at www.disposemymeds.org.          This list is accurate as of 11/30/18  1:22 PM.  Always use your most recent med list.                   Brand Name Dispense Instructions for use Diagnosis    acetaminophen 325 MG tablet    TYLENOL    100 tablet    Take 2 tablets (650 mg) by mouth every 4 hours as needed for other (mild pain)    S/P laparoscopic hysterectomy       citalopram 20 MG tablet    celeXA    90 tablet    Take 1 tablet (20 mg) by mouth daily    Adjustment disorder with anxious mood       * estrogen conj 0.3 MG tablet    PREMARIN    90 tablet    Take 1 tablet (0.3 mg) by mouth daily    Surgical menopause, BRCA1 positive       * estrogen conj 0.3 MG tablet    PREMARIN    90 tablet    Take 1 tablet (0.3 mg) by mouth daily    Surgical menopause       ketoconazole 2 % external shampoo    NIZORAL     LATHER IN SCALP FOR 5 MINUTES PRIOR TO RINSING. USE EVERY OTHER DAY FOR 1 WEEK.        meclizine 25 MG tablet    ANTIVERT     Take 25 mg by mouth 3 times daily as needed        metroNIDAZOLE 500 MG tablet    FLAGYL    14 tablet    Take 1 tablet (500 mg) by mouth 2 times daily    Bacterial vaginosis       nicotine 2 MG gum    NICORETTE    150 tablet    Place 1 each (2 mg) inside cheek as needed for smoking cessation    Encounter for smoking cessation counseling       ondansetron 4 MG ODT tab    ZOFRAN-ODT     Take 4 mg by mouth every 12 hours as needed        * Notice:  This list has 2 medication(s) that are the same as other medications prescribed for you. Read the directions carefully, and ask your doctor or other care provider to review them with you.

## 2018-11-30 NOTE — LETTER
"2018       RE: Celeste Arguello  [address]  [address]  [facility] [address]     Dear Colleague,    Thank you for referring your patient, Celeste Arguello, to the [facility] at [facility]. Please see a copy of my visit note below.    Subjective:  Celeste Arguello 33 year old year old female, , who presents with vaginal odor x 2 weeks. Reports s/p total hysterectomy in 2018.  Reports healing has been going well, some stitches fell out 2 weeks and then noticed onset of symptoms. Describes odor as fishy, used OTC treatment without resolution of symptoms. Denies abnormal discharge, itching, irritation or dysuria.     Also reports concerns about unable to achieve orgasm after surgery. Reports having difficulty achieving orgasm prior to surgery, but now completely unable. Currently taking citalopram, but has been using for multiple years. Reports supportive partner. Using lubricant.  Sees therapist weekly, has not approached topic with therapist but feels comfortable doing so.    Requests refill of premarin.    Objective:   /69 (BP Location: Left arm, Patient Position: Chair)  Pulse 66  Ht 1.549 m (5' 1\")  Wt 53.2 kg (117 lb 4.8 oz)  LMP 2016  BMI 22.16 kg/m2  She appears well, afebrile.    Pelvic Exam:  EG/BUS: Normal genitalia and Bartholin's, Urethra, [place]'s normal    Vagina: moist, pink, rugae with white, milky secretions  Cervix: surgically absent  Uterus: surgically absent   Adnexa: surgically absent  Rectum:anus normal    POCT UA: not done.  POCT Wet prep: ph 4.5  clue cells and lactobacilli  POCT UPT: N/A    Assessment:   Bacterial vaginosis    Plan:   Orders Placed This Encounter   Procedures     Wet Prep POCT     Discussed healing expectations and possible effects on sexual healthy. Encouraged to speak to therapist/mental health provider regarding orgasm. If issue continues to return to clinic.  Discussed " different types of lubricant.  Rx sent to pharmacy  Return to clinic prn if symptoms persist or worsen.    Again, thank you for allowing me to participate in the care of your patient.      Sincerely,    ENRIQUE Brown CNM

## 2018-11-30 NOTE — PROGRESS NOTES
"Subjective:  Celeste Arguello 33 year old year old female, , who presents with vaginal odor x 2 weeks. Reports s/p total hysterectomy in 2018.  Reports healing has been going well, some stitches fell out 2 weeks and then noticed onset of symptoms. Describes odor as fishy, used OTC treatment without resolution of symptoms. Denies abnormal discharge, itching, irritation or dysuria.     Also reports concerns about unable to achieve orgasm after surgery. Reports having difficulty achieving orgasm prior to surgery, but now completely unable. Currently taking citalopram, but has been using for multiple years. Reports supportive partner. Using lubricant.  Sees therapist weekly, has not approached topic with therapist but feels comfortable doing so.    Requests refill of premarin.    Objective:   /69 (BP Location: Left arm, Patient Position: Chair)  Pulse 66  Ht 1.549 m (5' 1\")  Wt 53.2 kg (117 lb 4.8 oz)  LMP 2016  BMI 22.16 kg/m2  She appears well, afebrile.    Pelvic Exam:  EG/BUS: Normal genitalia and Bartholin's, Urethra, [place]'s normal    Vagina: moist, pink, rugae with white, milky secretions  Cervix: surgically absent  Uterus: surgically absent   Adnexa: surgically absent  Rectum:anus normal    POCT UA: not done.  POCT Wet prep: ph 4.5  clue cells and lactobacilli  POCT UPT: N/A    Assessment:   Bacterial vaginosis    Plan:   Orders Placed This Encounter   Procedures     Wet Prep POCT     Discussed healing expectations and possible effects on sexual healthy. Encouraged to speak to therapist/mental health provider regarding orgasm. If issue continues to return to clinic.  Discussed different types of lubricant.  Rx sent to pharmacy  Return to clinic prn if symptoms persist or worsen.    "

## 2018-12-04 ENCOUNTER — OFFICE VISIT (OUTPATIENT)
Dept: SURGERY | Facility: CLINIC | Age: 33
End: 2018-12-04
Payer: COMMERCIAL

## 2018-12-04 VITALS
HEIGHT: 61 IN | WEIGHT: 117 LBS | HEART RATE: 65 BPM | SYSTOLIC BLOOD PRESSURE: 90 MMHG | BODY MASS INDEX: 22.09 KG/M2 | DIASTOLIC BLOOD PRESSURE: 60 MMHG

## 2018-12-04 DIAGNOSIS — Z15.09 BRCA1 GENE MUTATION POSITIVE: Primary | ICD-10-CM

## 2018-12-04 DIAGNOSIS — Z15.01 BRCA1 GENE MUTATION POSITIVE: Primary | ICD-10-CM

## 2018-12-04 PROCEDURE — 99243 OFF/OP CNSLTJ NEW/EST LOW 30: CPT | Performed by: SURGERY

## 2018-12-04 NOTE — MR AVS SNAPSHOT
After Visit Summary   12/4/2018    Celeste Arguello    MRN: [id number]           Patient Information     Date Of Birth          1985        Visit Information        Provider Department      12/4/2018 10:00 AM Roseann Boyd MD [facility]       Follow-ups after your visit        Your next 10 appointments already scheduled     Dec 19, 2018  1:00 PM CST   MR BREAST BILATERAL W/O & W CONTRAST with [facility] [facility] ([facility])    [address] [place] [id number] [phone]           How do I prepare for my exam? (Food and drink instructions) **If you will be receiving sedation or general anesthesia, please see special notes below.**  How do I prepare for my exam? (Other instructions) Take your medicines as usual, unless your doctor tells you not to. The timing of your exam may depend on the start of your last period. If you re in menopause, you may have the exam anytime.  You will have IV contrast for this exam.  You do not need to do anything special to prepare. **If you will be receiving sedation or general anesthesia, please see special notes below.**  What should I wear:  The MRI machine uses a strong magnet. Please wear clothes without metal (snaps, zippers). A sweatsuit works well, or we may give you a hospital gown. Please remove any body piercings and hair extensions before you arrive. You will also remove watches, jewelry, hairpins, wallets, dentures, partial dental plates and hearing aids. You may wear contact lenses, and you may be able to wear your rings. We have a safe place to keep your personal items, but it is safer to leave them at home.  How long does the exam take:  Most tests take 30 to 60 minutes.  HOWEVER, IF YOUR DOCTOR PRESCRIBES ANESTHESIA please plan on spending four to five hours in the recovery room.  What should I bring: Bring a  list of your current medicines to your exam (including vitamins, minerals and over-the-counter drugs). Please bring any previous mammograms or breast MRIs from other facilities to the MRI dept. Do not mail these items to us.  Do I need a : **If you will be receiving sedation or general anesthesia, please see special notes below.**  What should I do after the exam: No Restrictions, You may resume normal activities.  What is this test: MRI (magnetic resonance imaging) uses a strong magnet and radio waves to look inside the body. An MRA (magnetic resonance angiogram) does the same thing, but it lets us look at your blood vessels. A computer turns the radio waves into pictures showing cross sections of the body, much like slices of bread. This helps us see any problems more clearly. You may receive fluid (called  contrast ) before or during your scan. The fluid helps us see the pictures better. We give the fluid through an IV (small needle in your arm).  Who should I call with questions: If you have any questions, please contact your Imaging Department exam site. Directions, parking instructions, and other information is available on our website, [email or web address].  How do I prepare if I m having sedation or anesthesia? **IMPORTANT** THE INSTRUCTIONS BELOW ARE ONLY FOR THOSE PATIENTS WHO HAVE BEEN TOLD THEY WILL RECEIVE SEDATION OR GENERAL ANESTHESIA DURING THEIR MRI PROCEDURE:  IF YOU WILL RECEIVE SEDATION (take medicine to help you relax during your exam) You must get the medicine from your doctor before you arrive. Bring the medicine to the exam. Do not take it at home. Arrive one hour early. Bring someone who can take you home after the test. Your medicine will make you sleepy. After the exam, you may not drive, take a bus or take a taxi by yourself. No eating 8 hours before your exam. You may have clear liquids up until 4 hours before your exam. (Clear liquids include water, clear tea, black coffee and  fruit juice without pulp.)  IF YOU WILL RECEIVE ANESTHESIA (be asleep for your exam) Arrive 1 1/2 hours early. Bring someone who can take you home after the test. You may not drive, take a bus or take a taxi by yourself. No eating 8 hours before your exam. You may have clear liquids up until 4 hours before your exam. (Clear liquids include water, clear tea, black coffee and fruit juice without pulp.)            Scot 10, 2019   Procedure with Sheridan Dudley MD   [facility] [place], Same Day Surgery (--)    [address] [address]   [phone]            Jan 18, 2019 11:30 AM CST   (Arrive by 11:15 AM)   Post-Op with Melody Cruz RN   [facility] ([place])    [address] [place] [address]  4th Floor  [facility] [address]   [phone]            Jan 22, 2019  3:15 PM CST   (Arrive by 3:00 PM)   Post-Op with Sheridan Dudley MD   [facility] [place] ([facility] [place])    [address]  [address]  [facility] [address]   [phone]            Jun 21, 2019  1:00 PM CDT   [facility] Lab Draw with  [employer] LAB DRAW   [facility] ([place])    [address]  [address]  [facility] [address]   [phone]            Jun 21, 2019  1:30 PM CDT   (Arrive by 1:15 PM)   Return Visit with ENRIQUE Cat   [facility] ([place])    [address]  [address]  [facility] [address]   [phone]              Who to contact     If you have questions or need follow up information about today's clinic visit or your schedule please contact [facility] directly at [phone].  Normal or non-critical lab and imaging results will be communicated to you by MyChart, letter or phone within 4 business days after the clinic has received the results. If you do not hear from us within 7 days,  "please contact the clinic through [employer] or phone. If you have a critical or abnormal lab result, we will notify you by phone as soon as possible.  Submit refill requests through [employer] or call your pharmacy and they will forward the refill request to us. Please allow 3 business days for your refill to be completed.          Additional Information About Your Visit        [employer] Information     [employer] gives you secure access to your electronic health record. If you see a primary care provider, you can also send messages to your care team and make appointments. If you have questions, please call your primary care clinic.  If you do not have a primary care provider, please call [phone] and they will assist you.        Care EveryWhere ID     This is your Care EveryWhere ID. This could be used by other organizations to access your [place] medical records  [id number]        Your Vitals Were     Pulse Height Last Period BMI (Body Mass Index)          65 5' 1\" (1.549 m) 12/17/2016 22.11 kg/m2         Blood Pressure from Last 3 Encounters:   12/04/18 90/60   11/30/18 103/69   09/21/18 111/75    Weight from Last 3 Encounters:   12/04/18 117 lb (53.1 kg)   11/30/18 117 lb 4.8 oz (53.2 kg)   09/21/18 114 lb 14.4 oz (52.1 kg)              Today, you had the following     No orders found for display       Primary Care Provider Office Phone # Fax #    Delores Camarena, APRN [facility] [phone]       [address] [facility] [address]  [facility] [address]        Equal Access to Services     MUMTAZ ABURTO : Hadii aad ku hadasho Soomaali, waaxda luqadaha, qaybta kaalmada adeegyada, edgardo ramey . So [facility] [phone].    ATENCIÓN: Si habla español, tiene a de jesus disposición servicios gratuitos de asistencia lingüística. Llame al [phone].    We comply with applicable federal civil rights laws and [place] laws. We do not discriminate on the basis of race, color, national origin, age, " disability, sex, sexual orientation, or gender identity.            Thank you!     Thank you for choosing [facility]  for your care. Our goal is always to provide you with excellent care. Hearing back from our patients is one way we can continue to improve our services. Please take a few minutes to complete the written survey that you may receive in the mail after your visit with us. Thank you!             Your Updated Medication List - Protect others around you: Learn how to safely use, store and throw away your medicines at www.disposemymeds.org.          This list is accurate as of 12/4/18 10:29 AM.  Always use your most recent med list.                   Brand Name Dispense Instructions for use Diagnosis    acetaminophen 325 MG tablet    TYLENOL    100 tablet    Take 2 tablets (650 mg) by mouth every 4 hours as needed for other (mild pain)    S/P laparoscopic hysterectomy       citalopram 20 MG tablet    celeXA    90 tablet    Take 1 tablet (20 mg) by mouth daily    Adjustment disorder with anxious mood       * estrogen conj 0.3 MG tablet    PREMARIN    90 tablet    Take 1 tablet (0.3 mg) by mouth daily    Surgical menopause, BRCA1 positive       * estrogen conj 0.3 MG tablet    PREMARIN    90 tablet    Take 1 tablet (0.3 mg) by mouth daily    Surgical menopause       ketoconazole 2 % external shampoo    NIZORAL     LATHER IN SCALP FOR 5 MINUTES PRIOR TO RINSING. USE EVERY OTHER DAY FOR 1 WEEK.        meclizine 25 MG tablet    ANTIVERT     Take 25 mg by mouth 3 times daily as needed        metroNIDAZOLE 500 MG tablet    FLAGYL    14 tablet    Take 1 tablet (500 mg) by mouth 2 times daily    Bacterial vaginosis       nicotine 2 MG gum    NICORETTE    150 tablet    Place 1 each (2 mg) inside cheek as needed for smoking cessation    Encounter for smoking cessation counseling       ondansetron 4 MG ODT tab    ZOFRAN-ODT     Take 4 mg by mouth every 12 hours as needed        * Notice:  This list has 2  medication(s) that are the same as other medications prescribed for you. Read the directions carefully, and ask your doctor or other care provider to review them with you.

## 2018-12-04 NOTE — PROGRESS NOTES
[facility] Breast Surgery Consultation    HPI:   Celeste Arguello is a 33 year old female who is seen in consultation at the request of Dr. Gee for evaluation of BRCA1 positivity. She has a significant family history of breast and ovarian cancer and had testing last summer which revealed BRCA 1 positivity. She met with Miesha Purdy then and ultimately had MEHRDAD/BSO on 9/12/2018 with Dr. Huizar. She recovered well following surgery and feels much better without chronic pelvic pain (had endometriosis/PCOS). She had a screening mammogram in July 2018 which was negative. She has a screening breast MRI scheduled for 12/19/2018. She reports no new breast concerns. No breast pain. No masses. No nipple discharge or drainage. No prior breast surgery or biopsies.        Hormonal history:   menarche 11yo,  1 children, 1st at age 24, postmenopausal - s/p MEHRDAD/BSO as above, approx 15yrs OCP use,currently using low dose estrogen replacement,  no fertility treatment.     Family history of breast cancer: Yes - maternal cousin at 33yo, maternal aunts x2 at 50s and 62yo.   Family history of ovarian cancer:  Yes - mother diagnosed at 38yo  Family history of colon cancer: No  Family history of prostate cancer: No  Uncle with pancreatic cancer    Imaging:   Examination: Bilateral digital screening mammography and bilateral  digital tomosynthesis with computer aided detection.     Comparison: Baseline.     History/family history: 33 years old. Asymptomatic screening. Positive  for the BRCA1 mutation.     TECHNIQUE:  Tomosynthesis     BREAST DENSITY: Extremely dense.     COMMENTS: No suspicious finding.         IMPRESSION: BI-RADS CATEGORY: 1 -  NEGATIVE.     RECOMMENDED FOLLOW-UP: Annual Mammography  for high risk screening.      Results to be sent to the patient.      I have personally reviewed the examination and initial interpretation  and I agree with the findings.      Past Medical History:   has a past medical history  of BRCA1 gene mutation positive (6/15/2018).      Current Outpatient Prescriptions:      acetaminophen (TYLENOL) 325 MG tablet, Take 2 tablets (650 mg) by mouth every 4 hours as needed for other (mild pain) (Patient not taking: Reported on 11/30/2018), Disp: 100 tablet, Rfl: 0     citalopram (CELEXA) 20 MG tablet, Take 1 tablet (20 mg) by mouth daily, Disp: 90 tablet, Rfl: 4     estrogen conj (PREMARIN) 0.3 MG tablet, Take 1 tablet (0.3 mg) by mouth daily, Disp: 90 tablet, Rfl: 3     estrogens, conjugated, (PREMARIN) 0.3 MG tablet, Take 1 tablet (0.3 mg) by mouth daily, Disp: 90 tablet, Rfl: 4     ketoconazole (NIZORAL) 2 % shampoo, LATHER IN SCALP FOR 5 MINUTES PRIOR TO RINSING. USE EVERY OTHER DAY FOR 1 WEEK., Disp: , Rfl: 0     meclizine (ANTIVERT) 25 MG tablet, Take 25 mg by mouth 3 times daily as needed, Disp: , Rfl:      metroNIDAZOLE (FLAGYL) 500 MG tablet, Take 1 tablet (500 mg) by mouth 2 times daily, Disp: 14 tablet, Rfl: 0     nicotine polacrilex (NICORETTE) 2 MG gum, Place 1 each (2 mg) inside cheek as needed for smoking cessation (Patient not taking: Reported on 11/30/2018), Disp: 150 tablet, Rfl: 3     ondansetron (ZOFRAN-ODT) 4 MG ODT tab, Take 4 mg by mouth every 12 hours as needed , Disp: , Rfl:     Past Surgical History:  Past Surgical History:   Procedure Laterality Date     CYSTOSCOPY N/A 9/12/2018    Procedure: CYSTOSCOPY;;  Surgeon: Marybeth Grande MD;  Location: [facility] OR     HYSTERECTOMY TOTAL ABDOMINAL, BILATERAL SALPINGO-OOPHORECTOMY, NODE DISSECTION, COMBINED Bilateral 9/12/2018    Procedure: COMBINED HYSTERECTOMY TOTAL ABDOMINAL, SALPINGO-OOPHORECTOMY, NODE DISSECTION;;  Surgeon: Marybeth Grande MD;  Location: [facility] OR     LAPAROSCOPIC HYSTERECTOMY TOTAL, BILATERAL SALPINGO-OOPHORECTOMY, NODE DISSECTION, COMBINED N/A 9/12/2018    Procedure: COMBINED LAPAROSCOPIC HYSTERECTOMY TOTAL, SALPINGO-OOPHORECTOMY, NODE DISSECTION;  Laparoscopic Removal Of Uterus, Cervix, Both Ovaries  "And Fallopian Tubes, Cystoscopy ;  Surgeon: Marybeth Grande MD;  Location: [facility] OR     LAPAROSCOPY DIAGNOSTIC (GYN)  2006    endometriosis     urethral reimplant  age 6           Allergies   Allergen Reactions     Nkda [No Known Drug Allergies]      Chloraprep One Step Rash         Social History:  Social History     Social History     Marital status: Single     Spouse name: N/A     Number of children: 1     Years of education: N/A     Occupational History     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 One    How much calcium per day? Dairy products       How much caffeine per day? 1 cup coffee and 2 can of coke    How much vitamin D per day? None    Do you/your family wear seatbelts?  Yes    Do you/your family use safety helmets? yes    Do you/your family use sunscreen? Yes    Do you/your family keep firearms in the home? No    Do you/your family have a smoke detector(s)? Yes        Do you feel safe in your home? Yes    Has anyone ever touched you in an unwanted manner? No     Explain     SEE GLADYS [facility]     06/16/2014    Beronica Dumas [facility] 04/26/18                ROS:  The 10 point review of systems is negative other than noted in the HPI and above.    PE:  Vitals: BP 90/60  Pulse 65  Ht 5' 1\" (1.549 m)  Wt 117 lb (53.1 kg)  LMP 12/17/2016  BMI 22.11 kg/m2  General appearance: well-nourished, sitting comfortably, no apparent distress  Psych: normal affect, pleasant  HEENT:  Head normocephalic " and atraumatic, pupils equal and round, conjunctivae clear, mucous membranes moist, external ears and nose normal  Neck: Supple without thyromegaly or masses  Lungs: Respirations unlabored  Lymphatic: No cervical, or supraclavicular lymphadenopathy  Extremities: Without edema  Musculoskeletal:  Normal station and gait  Neurologic: nonfocal, grossly intact times four extremities, alert and oriented times three  Psychiatric: Mood and affect are appropriate  Skin: Without lesions or rashes    Breast:  A bilateral breast exam was performed in the supine position.. Bilateral breasts were palpated in a circumferential clockwise fashion including the supraclavicular and axillary areas.   Breast are symmetrical with no skin or nipple changes.  Contour is normal.Parenchyma is extremely dense. No masses.        Lymph:       No supraclavicular/infraclavicular adenopathy.   Axillary adenopathy: none    Assessment/Plan: Celeste Arguello is a 33 year old who presents with BRCA1 positivity. We discussed options including high risk screening, alternating breast MRI with mammogram, high risk screening plus tamoxifen, vs prophylactic mastectomies. We discussed all of the surgical details surrounding mastectomies including risks, benefits, indications and alternatives. She has met with Dr. Gee with plastic surgery and is leaning toward immediate reconstruction. She is a candidate for nipple sparing pending her MRI results on Dec 19th. We discussed nipple sparing is entirely cosmetic and there does not appear to be increased risk associated with sparing the nipple. She is concerned as one of her aunts had nipple sparing and did develop breast cancer in her nipple after her prophylactic surgery. I will call her with the results of her breast MRI and if she would like to proceed with scheduling bilateral prophylactic mastectomies she will let us know.       Time spent with the patient with greater that 50% of the time in  discussion was 50 minutes.    Roseann Boyd MD      Please route or send letter to:  Primary Care Provider (PCP) and Referring Provider

## 2018-12-04 NOTE — NURSING NOTE
Breast Patients    BREAST PATIENTS (ALL)    1-Do you have any of the following symptoms? Other: BRCA 1 positive   2-In which breast are you having the symptoms? N/A  3-Do you use hormones?  Yes  Type: Premarin  Dosage: 0.3mg 1 tab daily   4-Have you had a Mammogram? Yes  Where: [facility]   Date: 7/2/18  5-Have you ever had a breast cyst drained? No  6-Have you ever had a breast biopsy? No  7-Have you ever had a Breast Cancer? No   8-Is there a history of Breast Cancer in your family? Yes Relationship to you:  2 Maternal Aunts and cousin   9-Have you ever had Ovarian Cancer? No  10-Is there a history of Ovarian Cancer in your family? Yes   Relationship to you:    Mother  11-Summarize your caffeine intake (i.e. coffee, tea, chocolate, soda etc.): 1 cup of coffee and 1 can of soda per day     BREAST PATIENTS (FEMALE)    12-What age did your periods begin? 12  13-Date your last menstrual period began? 2015  14-Number of full-term pregnancies: 1  15-Your age when your first child was born? 23  16-Did you nurse your children? Yes  17-Are you pregnant now? No  18-Have you begun menopause? No, s/p hysterectomy on 9/12/18  19-Have you had either ovary removed? Yes, 9/12/18  20-Do you have breast implants? No     Xiomara Sanchez MA

## 2018-12-04 NOTE — LETTER
2018    Re: Celeste Arguello - 1985    Celeste Arguello is a 33 year old female who is seen in consultation at the request of Dr. Gee for evaluation of BRCA1 positivity. She has a significant family history of breast and ovarian cancer and had testing last summer which revealed BRCA 1 positivity. She met with Miesha Purdy then and ultimately had MEHRDAD/BSO on 2018 with Dr. Huizar. She recovered well following surgery and feels much better without chronic pelvic pain (had endometriosis/PCOS). She had a screening mammogram in 2018 which was negative. She has a screening breast MRI scheduled for 2018. She reports no new breast concerns. No breast pain. No masses. No nipple discharge or drainage. No prior breast surgery or biopsies.        Hormonal history:   menarche 11yo,  1 children, 1st at age 24, postmenopausal - s/p MEHRDAD/BSO as above, approx 15yrs OCP use,currently using low dose estrogen replacement,  no fertility treatment.      Family history of breast cancer: Yes - maternal cousin at 33yo, maternal aunts x2 at 50s and 60yo.   Family history of ovarian cancer:  Yes - mother diagnosed at 40yo  Family history of colon cancer: No  Family history of prostate cancer: No  Uncle with pancreatic cancer     Imaging:   Examination: Bilateral digital screening mammography and bilateral  digital tomosynthesis with computer aided detection.      Comparison: Baseline.      History/family history: 33 years old. Asymptomatic screening. Positive  for the BRCA1 mutation.      TECHNIQUE:  Tomosynthesis      BREAST DENSITY: Extremely dense.      COMMENTS: No suspicious finding.        IMPRESSION: BI-RADS CATEGORY: 1 -  NEGATIVE.      RECOMMENDED FOLLOW-UP: Annual Mammography  for high risk screening.       Results to be sent to the patient.       I have personally reviewed the examination and initial interpretation  and I agree with the findings.      Past Medical History:  has a past  "medical history of BRCA1 gene mutation positive (6/15/2018).      Current Outpatient Prescriptions:      acetaminophen (TYLENOL) 325 MG tablet, Take 2 tablets (650 mg) by mouth every 4 hours as needed for other (mild pain) (Patient not taking: Reported on 11/30/2018), Disp: 100 tablet, Rfl: 0     citalopram (CELEXA) 20 MG tablet, Take 1 tablet (20 mg) by mouth daily, Disp: 90 tablet, Rfl: 4     estrogen conj (PREMARIN) 0.3 MG tablet, Take 1 tablet (0.3 mg) by mouth daily, Disp: 90 tablet, Rfl: 3     estrogens, conjugated, (PREMARIN) 0.3 MG tablet, Take 1 tablet (0.3 mg) by mouth daily, Disp: 90 tablet, Rfl: 4     ketoconazole (NIZORAL) 2 % shampoo, LATHER IN SCALP FOR 5 MINUTES PRIOR TO RINSING. USE EVERY OTHER DAY FOR 1 WEEK., Disp: , Rfl: 0     meclizine (ANTIVERT) 25 MG tablet, Take 25 mg by mouth 3 times daily as needed, Disp: , Rfl:      metroNIDAZOLE (FLAGYL) 500 MG tablet, Take 1 tablet (500 mg) by mouth 2 times daily, Disp: 14 tablet, Rfl: 0     nicotine polacrilex (NICORETTE) 2 MG gum, Place 1 each (2 mg) inside cheek as needed for smoking cessation (Patient not taking: Reported on 11/30/2018), Disp: 150 tablet, Rfl: 3     ondansetron (ZOFRAN-ODT) 4 MG ODT tab, Take 4 mg by mouth every 12 hours as needed , Disp: , Rfl:      ROS:  The 10 point review of systems is negative other than noted in the HPI and above.     PE:  Vitals: BP 90/60  Pulse 65  Ht 5' 1\" (1.549 m)  Wt 117 lb (53.1 kg)  LMP 12/17/2016  BMI 22.11 kg/m2  General appearance: well-nourished, sitting comfortably, no apparent distress  Psych: normal affect, pleasant  HEENT:  Head normocephalic and atraumatic, pupils equal and round, conjunctivae clear, mucous membranes moist, external ears and nose normal  Neck: Supple without thyromegaly or masses  Lungs: Respirations unlabored  Lymphatic: No cervical, or supraclavicular lymphadenopathy  Extremities: Without edema  Musculoskeletal:  Normal station and gait  Neurologic: nonfocal, grossly " intact times four extremities, alert and oriented times three  Psychiatric: Mood and affect are appropriate  Skin: Without lesions or rashes     Breast:  A bilateral breast exam was performed in the supine position.. Bilateral breasts were palpated in a circumferential clockwise fashion including the supraclavicular and axillary areas.   Breast are symmetrical with no skin or nipple changes.  Contour is normal.Parenchyma is extremely dense. No masses.        Lymph:          No supraclavicular/infraclavicular adenopathy.                         Axillary adenopathy: none     Assessment/Plan: Celeste Arguello is a 33 year old who presents with BRCA1 positivity. We discussed options including high risk screening, alternating breast MRI with mammogram, high risk screening plus tamoxifen, vs prophylactic mastectomies. We discussed all of the surgical details surrounding mastectomies including risks, benefits, indications and alternatives. She has met with Dr. Gee with plastic surgery and is leaning toward immediate reconstruction. She is a candidate for nipple sparing pending her MRI results on Dec 19th. We discussed nipple sparing is entirely cosmetic and there does not appear to be increased risk associated with sparing the nipple. She is concerned as one of her aunts had nipple sparing and did develop breast cancer in her nipple after her prophylactic surgery. I will call her with the results of her breast MRI and if she would like to proceed with scheduling bilateral prophylactic mastectomies she will let us know.              Roseann Boyd MD

## 2018-12-19 ENCOUNTER — ANCILLARY PROCEDURE (OUTPATIENT)
Dept: MRI IMAGING | Facility: CLINIC | Age: 33
End: 2018-12-19
Attending: CLINICAL NURSE SPECIALIST
Payer: COMMERCIAL

## 2018-12-19 DIAGNOSIS — Z12.39 ENCOUNTER FOR BREAST CANCER SCREENING OTHER THAN MAMMOGRAM: ICD-10-CM

## 2018-12-19 DIAGNOSIS — Z15.09 BRCA1 POSITIVE: ICD-10-CM

## 2018-12-19 DIAGNOSIS — Z15.01 BRCA1 POSITIVE: ICD-10-CM

## 2018-12-19 RX ORDER — GADOBUTROL 604.72 MG/ML
7.5 INJECTION INTRAVENOUS ONCE
Status: COMPLETED | OUTPATIENT
Start: 2018-12-19 | End: 2018-12-19

## 2018-12-19 RX ADMIN — GADOBUTROL 6 ML: 604.72 INJECTION INTRAVENOUS at 13:14

## 2018-12-20 ENCOUNTER — TELEPHONE (OUTPATIENT)
Dept: SURGERY | Facility: CLINIC | Age: 33
End: 2018-12-20

## 2018-12-20 ENCOUNTER — PATIENT OUTREACH (OUTPATIENT)
Dept: PLASTIC SURGERY | Facility: CLINIC | Age: 33
End: 2018-12-20

## 2018-12-20 NOTE — PROGRESS NOTES
"I contacted pt to check smoking status and to see if she has made a decision about nipple sparing or non-nipple sparing mastectomy. Patient states she quit smoking 8/1/18 and that she \"has no idea\" who scheduled her for surgery on Scot 10, but she is not prepared for surgery yet and is still trying to decide about nipples.     Message sent to surgery scheduler, Dr. Soliman and Dr. Dudley to cancel surgery, pre and post op appts.  She states she will call us when she is ready.   "

## 2018-12-20 NOTE — TELEPHONE ENCOUNTER
Follow up call placed to patient. Appears per [facility], patient has surgery scheduled mid January 2019 with Dr. Dudley. Encouraged patient to call back with additional questions/concerns.    Alina ZAYASN, RN, OCN  Oncology Care Coordinator  [facility]  [phone]

## 2018-12-21 ENCOUNTER — TELEPHONE (OUTPATIENT)
Dept: ONCOLOGY | Facility: CLINIC | Age: 33
End: 2018-12-21

## 2019-01-30 ENCOUNTER — TELEPHONE (OUTPATIENT)
Dept: SURGERY | Facility: PHYSICIAN GROUP | Age: 34
End: 2019-01-30

## 2019-01-30 NOTE — TELEPHONE ENCOUNTER
Left message for patient to let her know that Dr. Gee's coordinator is out of the office until Tuesday, February 5th, but I was able to talk with a coworker and narrowed surgery dates down to April 3rd or April 8th.  I will follow-up with patient on Tuesday to confirm specific details for date and time of prophylactic mastectomy.    Patient encouraged to call the office with any questions or concerns in the meantime.

## 2019-02-08 ENCOUNTER — TELEPHONE (OUTPATIENT)
Dept: SURGERY | Facility: PHYSICIAN GROUP | Age: 34
End: 2019-02-08

## 2019-02-08 NOTE — TELEPHONE ENCOUNTER
Type of surgery: Bilateral mastectomy, possible nipple sparing  Location of surgery: [facility]  Date and time of surgery: 4/8/19 at 7:30am  Surgeon: Dr. Roseann Boyd  Pre-Op Appt Date: Patient to schedule  Post-Op Appt Date: Patient to schedule   Packet sent out: Yes  Pre-cert/Authorization completed:  Not Applicable  Date: 2/8/19    Coordinated with Dr. Gerard rinaldi (Anthony #[phone])

## 2019-03-11 ENCOUNTER — TELEPHONE (OUTPATIENT)
Dept: SURGERY | Facility: CLINIC | Age: 34
End: 2019-03-11

## 2019-03-11 NOTE — TELEPHONE ENCOUNTER
Celeste is scheduled for bilateral prophylactic mastectomy, tissue expanders on 4/8/2019 with Tano Boyd and Gerard. Pre procedure call placed to patient.    Left message requesting call back.    Alina ZAYASN, RN, OCN  Oncology Care Coordinator  [facility][note 3]/[facility]  [phone]

## 2019-03-20 NOTE — PROGRESS NOTES
[facility]  [address] [place] [id number]  [phone]  Dept: [phone]    PRE-OP EVALUATION:  Today's date: 3/25/2019    Celeste Arguello (: 1985) presents for pre-operative evaluation assessment as requested by Dr. Roseann Boyd.  She requires evaluation and anesthesia risk assessment prior to undergoing surgery/procedure for Bilateral mastectomy, possible nipple sparing.    {PREOP QUESTIONNAIRE OPTIONS (by MA):446335}    HPI:     HPI related to upcoming procedure: ***      {Ch. Problems:287414}    MEDICAL HISTORY:     Patient Active Problem List    Diagnosis Date Noted     BRCA1 gene mutation positive 06/15/2018     Priority: Medium     BRCA1 c.181T>G (p.C61G)  tested at [facility] 2018       Oral contraceptive use -- for cyst control 2015     Priority: Medium     Endometriosis 2015     Priority: Medium     Mirena IUD (intrauterine device) in place 2015     Priority: Medium     2/24/15: Mirena IUD retrieved and new Mirena placed.(Dr. Crisostomo)       Encounter for routine gynecological examination 2014     Priority: Medium     : Pap-> NIL  : PAP-> NIL  Problem list name updated by automated process. Provider to review       Vitamin d deficiency 23 2012     Priority: Medium     Vitamin D3 5000 iu supplement until able to get daily 15' sun to skin this summer. Consider level recheck.       Female genital symptoms 2012     Priority: Medium     2014: Restart SSRI  12: Controlled on Celexa.  Problem list name updated by automated process. Provider to review       Mother  of ovarian cancer, age 47 2012     Priority: Medium     16: Referred to GYN/ONC for genetic counseling/risk eval  MA + gene for ovarian and breast cancer --> elective surgery       Surveillance of previously prescribed intrauterine contraceptive device 2012     Priority: Medium     Pelvic pain in female 2012     Priority:  Medium      Past Medical History:   Diagnosis Date     BRCA1 gene mutation positive 6/15/2018    BRCA1 c.181T>G (p.C61G) tested at [facility] 5/9/2018     Past Surgical History:   Procedure Laterality Date     CYSTOSCOPY N/A 9/12/2018    Procedure: CYSTOSCOPY;;  Surgeon: Marybeth Grnade MD;  Location: [facility] OR     HYSTERECTOMY TOTAL ABDOMINAL, BILATERAL SALPINGO-OOPHORECTOMY, NODE DISSECTION, COMBINED Bilateral 9/12/2018    Procedure: COMBINED HYSTERECTOMY TOTAL ABDOMINAL, SALPINGO-OOPHORECTOMY, NODE DISSECTION;;  Surgeon: Marybeth Grande MD;  Location: [facility] OR     LAPAROSCOPIC HYSTERECTOMY TOTAL, BILATERAL SALPINGO-OOPHORECTOMY, NODE DISSECTION, COMBINED N/A 9/12/2018    Procedure: COMBINED LAPAROSCOPIC HYSTERECTOMY TOTAL, SALPINGO-OOPHORECTOMY, NODE DISSECTION;  Laparoscopic Removal Of Uterus, Cervix, Both Ovaries And Fallopian Tubes, Cystoscopy ;  Surgeon: Marybeth Grande MD;  Location: [facility] OR     LAPAROSCOPY DIAGNOSTIC (GYN)  2006    endometriosis     urethral reimplant  age 6     Current Outpatient Medications   Medication Sig Dispense Refill     acetaminophen (TYLENOL) 325 MG tablet Take 2 tablets (650 mg) by mouth every 4 hours as needed for other (mild pain) 100 tablet 0     citalopram (CELEXA) 20 MG tablet Take 1 tablet (20 mg) by mouth daily 90 tablet 4     estrogen conj (PREMARIN) 0.3 MG tablet Take 1 tablet (0.3 mg) by mouth daily 90 tablet 3     estrogens, conjugated, (PREMARIN) 0.3 MG tablet Take 1 tablet (0.3 mg) by mouth daily 90 tablet 4     ketoconazole (NIZORAL) 2 % shampoo LATHER IN SCALP FOR 5 MINUTES PRIOR TO RINSING. USE EVERY OTHER DAY FOR 1 WEEK.  0     meclizine (ANTIVERT) 25 MG tablet Take 25 mg by mouth 3 times daily as needed       metroNIDAZOLE (FLAGYL) 500 MG tablet Take 1 tablet (500 mg) by mouth 2 times daily 14 tablet 0     nicotine polacrilex (NICORETTE) 2 MG gum Place 1 each (2 mg) inside cheek as needed for smoking cessation 150 tablet 3     ondansetron  "(ZOFRAN-ODT) 4 MG ODT tab Take 4 mg by mouth every 12 hours as needed        OTC products: {OTC ANALGESICS:779854}    Allergies   Allergen Reactions     Chloraprep One Step Rash      Latex Allergy: {YES/NO WITH DEFAULT:666402::\"NO\"}    Social History     Tobacco Use     Smoking status: Former Smoker     Packs/day: 0.50     Years: 10.00     Pack years: 5.00     Types: Cigarettes     Start date: 2005     Last attempt to quit: 2018     Years since quittin.6     Smokeless tobacco: Never Used   Substance Use Topics     Alcohol use: No     Comment: 1 per year      History   Drug Use No       REVIEW OF SYSTEMS:   {ROS Preop Choices:932801}    EXAM:   LMP 2016   {EXAM Preop Choices:931130}    DIAGNOSTICS:   {DIAGNOSTIC FOR PREOP:365485}    Recent Labs   Lab Test 18  1558   HGB 14.0         POTASSIUM 3.5   CR 0.78        IMPRESSION:   {PREOP REASONS:484821::\"Reason for surgery/procedure: ***\",\"Diagnosis/reason for consult: ***\"}    The proposed surgical procedure is considered {HIGH=major cardiovascular or procedures requiring prolonged anesthesia >4 hours or large fluid shifts;    INTERMEDIATE=abdominal, most orthopedic and intrathoracic surgery; LOW= endoscopy, cataract and breast surgery:267782} risk.    REVISED CARDIAC RISK INDEX  The patient has the following serious cardiovascular risks for perioperative complications such as (MI, PE, VFib and 3  AV Block):  {PREOP REVISED CARDIAC INDEX (RCI):933040:p:\"No serious cardiac risks\"}  INTERPRETATION: {REVISED CARDIAC RISK INTERPRETATION:977155}    The patient has the following additional risks for perioperative complications:  {Additional perioperative risks:733060:p:\"No identified additional risks\"}      ICD-10-CM    1. Preop general physical exam Z01.818    2. Need for prophylactic vaccination and inoculation against influenza Z23    3. Need for prophylactic vaccination with tetanus-diphtheria (Td) Z23        RECOMMENDATIONS: " "    {IMPORTANT - Conditions - complete carefully!!:939799}    {IMPORTANT - Medications:773295::\"--Patient is to take all scheduled medications on the day of surgery EXCEPT for modifications listed below.\"}    {IMPORTANT - Approval:371885:p:\"APPROVAL GIVEN to proceed with proposed procedure, without further diagnostic evaluation\"}       Signed Electronically by: ENRIQUE Renae CNP    Copy of this evaluation report is provided to requesting physician.    [facility] Preop Guidelines    Revised Cardiac Risk Index  "

## 2019-03-25 ENCOUNTER — OFFICE VISIT (OUTPATIENT)
Dept: FAMILY MEDICINE | Facility: CLINIC | Age: 34
End: 2019-03-25
Payer: COMMERCIAL

## 2019-03-25 VITALS
BODY MASS INDEX: 21.34 KG/M2 | DIASTOLIC BLOOD PRESSURE: 76 MMHG | OXYGEN SATURATION: 98 % | RESPIRATION RATE: 16 BRPM | WEIGHT: 113 LBS | HEIGHT: 61 IN | TEMPERATURE: 96.8 F | SYSTOLIC BLOOD PRESSURE: 116 MMHG | HEART RATE: 81 BPM

## 2019-03-25 DIAGNOSIS — Z80.41 FAMILY HISTORY OF OVARIAN CANCER: ICD-10-CM

## 2019-03-25 DIAGNOSIS — Z15.01 BRCA1 GENE MUTATION POSITIVE: ICD-10-CM

## 2019-03-25 DIAGNOSIS — Z23 NEED FOR PROPHYLACTIC VACCINATION AND INOCULATION AGAINST INFLUENZA: ICD-10-CM

## 2019-03-25 DIAGNOSIS — H81.10 BENIGN PAROXYSMAL POSITIONAL VERTIGO, UNSPECIFIED LATERALITY: ICD-10-CM

## 2019-03-25 DIAGNOSIS — Z88.8 ALLERGY TO CHLORHEXIDINE: ICD-10-CM

## 2019-03-25 DIAGNOSIS — Z01.818 PREOP GENERAL PHYSICAL EXAM: Primary | ICD-10-CM

## 2019-03-25 DIAGNOSIS — Z15.09 BRCA1 GENE MUTATION POSITIVE: ICD-10-CM

## 2019-03-25 LAB
BASOPHILS # BLD AUTO: 0 10E9/L (ref 0–0.2)
BASOPHILS NFR BLD AUTO: 0.4 %
DIFFERENTIAL METHOD BLD: ABNORMAL
EOSINOPHIL # BLD AUTO: 0.2 10E9/L (ref 0–0.7)
EOSINOPHIL NFR BLD AUTO: 2.3 %
ERYTHROCYTE [DISTWIDTH] IN BLOOD BY AUTOMATED COUNT: 13.2 % (ref 10–15)
HCT VFR BLD AUTO: 41.1 % (ref 35–47)
HGB BLD-MCNC: 13.6 G/DL (ref 11.7–15.7)
LYMPHOCYTES # BLD AUTO: 2.7 10E9/L (ref 0.8–5.3)
LYMPHOCYTES NFR BLD AUTO: 37.9 %
MCH RBC QN AUTO: 29.1 PG (ref 26.5–33)
MCHC RBC AUTO-ENTMCNC: 33.1 G/DL (ref 31.5–36.5)
MCV RBC AUTO: 88 FL (ref 78–100)
MONOCYTES # BLD AUTO: 0.7 10E9/L (ref 0–1.3)
MONOCYTES NFR BLD AUTO: 9.5 %
NEUTROPHILS # BLD AUTO: 3.5 10E9/L (ref 1.6–8.3)
NEUTROPHILS NFR BLD AUTO: 49.9 %
PLATELET # BLD AUTO: 149 10E9/L (ref 150–450)
RBC # BLD AUTO: 4.67 10E12/L (ref 3.8–5.2)
WBC # BLD AUTO: 7 10E9/L (ref 4–11)

## 2019-03-25 PROCEDURE — 90471 IMMUNIZATION ADMIN: CPT | Performed by: NURSE PRACTITIONER

## 2019-03-25 PROCEDURE — 85025 COMPLETE CBC W/AUTO DIFF WBC: CPT | Performed by: NURSE PRACTITIONER

## 2019-03-25 PROCEDURE — 93000 ELECTROCARDIOGRAM COMPLETE: CPT | Performed by: NURSE PRACTITIONER

## 2019-03-25 PROCEDURE — 99214 OFFICE O/P EST MOD 30 MIN: CPT | Mod: 25 | Performed by: NURSE PRACTITIONER

## 2019-03-25 PROCEDURE — 36415 COLL VENOUS BLD VENIPUNCTURE: CPT | Performed by: NURSE PRACTITIONER

## 2019-03-25 PROCEDURE — 90686 IIV4 VACC NO PRSV 0.5 ML IM: CPT | Performed by: NURSE PRACTITIONER

## 2019-03-25 RX ORDER — MECLIZINE HYDROCHLORIDE 25 MG/1
25 TABLET ORAL 3 TIMES DAILY PRN
Qty: 60 TABLET | Refills: 1 | Status: SHIPPED | OUTPATIENT
Start: 2019-03-25 | End: 2019-12-09

## 2019-03-25 RX ORDER — ONDANSETRON 4 MG/1
4 TABLET, ORALLY DISINTEGRATING ORAL EVERY 8 HOURS PRN
Qty: 18 TABLET | Refills: 1 | Status: SHIPPED | OUTPATIENT
Start: 2019-03-25 | End: 2019-12-09

## 2019-03-25 ASSESSMENT — PAIN SCALES - GENERAL: PAINLEVEL: NO PAIN (0)

## 2019-03-25 ASSESSMENT — MIFFLIN-ST. JEOR: SCORE: 1159.94

## 2019-03-25 NOTE — Clinical Note
Please fax pre-op notes, labs, and EKG from 3/25/19 to surgical facility listed in pre-op note.Carolyn San, CNP

## 2019-03-25 NOTE — PROGRESS NOTES
Injectable Influenza Immunization Documentation    1.  Is the person to be vaccinated sick today?   No    2. Does the person to be vaccinated have an allergy to a component   of the vaccine?   No  Egg Allergy Algorithm Link    3. Has the person to be vaccinated ever had a serious reaction   to influenza vaccine in the past?   No    4. Has the person to be vaccinated ever had Guillain-Barré syndrome?   No    Form completed by Colton Randhawa CMA on 3/25/2019 at 2:38 PM

## 2019-03-25 NOTE — PROGRESS NOTES
[facility]  [address] [place] [id number]  [phone]  Dept: [phone]    PRE-OP EVALUATION:  Today's date: 3/25/2019    Celeste Arguello (: 1985) presents for pre-operative evaluation assessment as requested by Dr. Roseann Boyd.  She requires evaluation and anesthesia risk assessment prior to undergoing surgery/procedure for Bilateral mastectomy, possible nipple sparing.    Proposed Surgery/ Procedure: Bilateral mastectomy, possible nipple sparing  Date of Surgery/ Procedure: 2019  Time of Surgery/ Procedure: 7:30am  Hospital/Surgical Facility:   Primary Physician: Carolyn San  Type of Anesthesia Anticipated: to be determined    Patient has a Health Care Directive or Living Will:  NO    1. NO - Do you have a history of heart attack, stroke, stent, bypass or surgery on an artery in the head, neck, heart or legs?  2. NO - Do you ever have any pain or discomfort in your chest?  3. NO - Do you have a history of  Heart Failure?  4. NO - Are you troubled by shortness of breath when: walking on the level, up a slight hill or at night?  5. NO - Do you currently have a cold, bronchitis or other respiratory infection?  6. NO - Do you have a cough, shortness of breath or wheezing?  7. NO - Do you sometimes get pains in the calves of your legs when you walk?  8. NO - Do you or anyone in your family have previous history of blood clots?  9. NO - Do you or does anyone in your family have a serious bleeding problem such as prolonged bleeding following surgeries or cuts?  10. NO - Have you ever had problems with anemia or been told to take iron pills?  11. NO - Have you had any abnormal blood loss such as black, tarry or bloody stools, or abnormal vaginal bleeding?  12. NO - Have you ever had a blood transfusion?  13. NO - Have you or any of your relatives ever had problems with anesthesia?  14. NO - Do you have sleep apnea, excessive snoring or daytime  drowsiness?  15. NO - Do you have any prosthetic heart valves?  16. NO - Do you have prosthetic joints?  17. NO - Is there any chance that you may be pregnant?      HPI:     HPI related to upcoming procedure: Mother  at age 47 from ovarian cancer, patient carries 2 BRCA gene mutations. Had prophylactic hysterectomy in September and did well, now plans for bilateral mastectomy for prophylaxis.       See problem list for active medical problems.  Problems all longstanding and stable, except as noted/documented.  See ROS for pertinent symptoms related to these conditions.                                                                                                                                                          .    MEDICAL HISTORY:     Patient Active Problem List    Diagnosis Date Noted     BRCA1 gene mutation positive 06/15/2018     Priority: Medium     BRCA1 c.181T>G (p.C61G)  tested at [facility] 2018       Oral contraceptive use -- for cyst control 2015     Priority: Medium     Endometriosis 2015     Priority: Medium     Mirena IUD (intrauterine device) in place 2015     Priority: Medium     2/24/15: Mirena IUD retrieved and new Mirena placed.(Dr. Crisostomo)       Encounter for routine gynecological examination 2014     Priority: Medium     : Pap-> NIL  : PAP-> NIL  Problem list name updated by automated process. Provider to review       Vitamin d deficiency 23 2012     Priority: Medium     Vitamin D3 5000 iu supplement until able to get daily 15' sun to skin this summer. Consider level recheck.       Female genital symptoms 2012     Priority: Medium     2014: Restart SSRI  12: Controlled on Celexa.  Problem list name updated by automated process. Provider to review       Mother  of ovarian cancer, age 47 2012     Priority: Medium     16: Referred to GYN/ONC for genetic counseling/risk eval  MA + gene for ovarian and breast cancer  --> elective surgery       Surveillance of previously prescribed intrauterine contraceptive device 04/08/2012     Priority: Medium     Pelvic pain in female 04/08/2012     Priority: Medium      Past Medical History:   Diagnosis Date     BRCA1 gene mutation positive 6/15/2018    BRCA1 c.181T>G (p.C61G) tested at [facility] 5/9/2018     Past Surgical History:   Procedure Laterality Date     CYSTOSCOPY N/A 9/12/2018    Procedure: CYSTOSCOPY;;  Surgeon: Marybeth Grande MD;  Location: [facility] OR     HYSTERECTOMY TOTAL ABDOMINAL, BILATERAL SALPINGO-OOPHORECTOMY, NODE DISSECTION, COMBINED Bilateral 9/12/2018    Procedure: COMBINED HYSTERECTOMY TOTAL ABDOMINAL, SALPINGO-OOPHORECTOMY, NODE DISSECTION;;  Surgeon: Marybeth Grande MD;  Location: [facility] OR     LAPAROSCOPIC HYSTERECTOMY TOTAL, BILATERAL SALPINGO-OOPHORECTOMY, NODE DISSECTION, COMBINED N/A 9/12/2018    Procedure: COMBINED LAPAROSCOPIC HYSTERECTOMY TOTAL, SALPINGO-OOPHORECTOMY, NODE DISSECTION;  Laparoscopic Removal Of Uterus, Cervix, Both Ovaries And Fallopian Tubes, Cystoscopy ;  Surgeon: Marybeth Grande MD;  Location: [facility] OR     LAPAROSCOPY DIAGNOSTIC (GYN)  2006    endometriosis     urethral reimplant  age 6     Current Outpatient Medications   Medication Sig Dispense Refill     acetaminophen (TYLENOL) 325 MG tablet Take 2 tablets (650 mg) by mouth every 4 hours as needed for other (mild pain) 100 tablet 0     citalopram (CELEXA) 20 MG tablet Take 1 tablet (20 mg) by mouth daily 90 tablet 4     estrogen conj (PREMARIN) 0.3 MG tablet Take 1 tablet (0.3 mg) by mouth daily 90 tablet 3     estrogens, conjugated, (PREMARIN) 0.3 MG tablet Take 1 tablet (0.3 mg) by mouth daily 90 tablet 4     ketoconazole (NIZORAL) 2 % shampoo LATHER IN SCALP FOR 5 MINUTES PRIOR TO RINSING. USE EVERY OTHER DAY FOR 1 WEEK.  0     meclizine (ANTIVERT) 25 MG tablet Take 25 mg by mouth 3 times daily as needed       metroNIDAZOLE (FLAGYL) 500 MG tablet Take 1 tablet (500 mg)  "by mouth 2 times daily 14 tablet 0     nicotine polacrilex (NICORETTE) 2 MG gum Place 1 each (2 mg) inside cheek as needed for smoking cessation 150 tablet 3     ondansetron (ZOFRAN-ODT) 4 MG ODT tab Take 4 mg by mouth every 12 hours as needed        OTC products: None, except as noted above    Allergies   Allergen Reactions     Chloraprep One Step Rash      Latex Allergy: NO    Social History     Tobacco Use     Smoking status: Former Smoker     Packs/day: 0.50     Years: 10.00     Pack years: 5.00     Types: Cigarettes     Start date: 2005     Last attempt to quit: 2018     Years since quittin.6     Smokeless tobacco: Never Used   Substance Use Topics     Alcohol use: No     Comment: 1 per year      History   Drug Use No       REVIEW OF SYSTEMS:   Constitutional, neuro, ENT, endocrine, pulmonary, cardiac, gastrointestinal, genitourinary, musculoskeletal, integument and psychiatric systems are negative, except as otherwise noted.    EXAM:   /76   Pulse 81   Temp 96.8  F (36  C) (Oral)   Resp 16   Ht 1.549 m (5' 1\")   Wt 51.3 kg (113 lb)   LMP 2016   SpO2 98%   BMI 21.35 kg/m      GENERAL APPEARANCE: healthy, alert and no distress     EYES: EOMI, PERRL     HENT: ear canals and TM's normal and nose and mouth without ulcers or lesions     NECK: no adenopathy, no asymmetry, masses, or scars and thyroid normal to palpation     RESP: lungs clear to auscultation - no rales, rhonchi or wheezes     CV: regular rates and rhythm, normal S1 S2, no S3 or S4 and no murmur, click or rub     ABDOMEN:  soft, nontender, no HSM or masses and bowel sounds normal     MS: extremities normal- no gross deformities noted, no evidence of inflammation in joints, FROM in all extremities.     SKIN: no suspicious lesions or rashes     NEURO: Normal strength and tone, sensory exam grossly normal, mentation intact and speech normal     PSYCH: mentation appears normal. and affect normal/bright     LYMPHATICS: No " cervical adenopathy    DIAGNOSTICS:   EKG: appears normal, NSR, normal axis, normal intervals, no acute ST/T changes c/w ischemia, no LVH by voltage criteria, unchanged from previous tracings    Recent Labs   Lab Test 18  1558   HGB 14.0         POTASSIUM 3.5   CR 0.78        IMPRESSION:   Reason for surgery/procedure: Breast Cancer Prophylaxis  Diagnosis/reason for consult: Evaluate perioperative risk    The proposed surgical procedure is considered INTERMEDIATE risk.    REVISED CARDIAC RISK INDEX  The patient has the following serious cardiovascular risks for perioperative complications such as (MI, PE, VFib and 3  AV Block):  No serious cardiac risks  INTERPRETATION: 0 risks: Class I (very low risk - 0.4% complication rate)    The patient has the following additional risks for perioperative complications:  No identified additional risks      ICD-10-CM    1. Preop general physical exam Z01.818 EKG 12-lead complete w/read - Clinics     CBC with platelets differential   2. BRCA1 gene mutation positive Z15.01     Z15.09    3. Mother  of ovarian cancer, age 47 Z80.41    4. Allergy to chlorhexidine Z88.8    5. Benign paroxysmal positional vertigo, unspecified laterality H81.10 ondansetron (ZOFRAN-ODT) 4 MG ODT tab     meclizine (ANTIVERT) 25 MG tablet   6. Need for prophylactic vaccination and inoculation against influenza Z23 FLU VACCINE, SPLIT VIRUS, IM (QUADRIVALENT) [05659]- >3 YRS     Vaccine Administration, Initial [85003]       RECOMMENDATIONS:   --Patient is to take all scheduled medications on the day of surgery EXCEPT for modifications listed below.    --Please Note Chloraprep allergy and use alternate    APPROVAL GIVEN to proceed with proposed procedure, without further diagnostic evaluation       Signed Electronically by: ENRIQUE Renae CNP    Copy of this evaluation report is provided to requesting physician.    [facility] Preop Guidelines    Revised Cardiac Risk Index

## 2019-03-26 RX ORDER — CEFAZOLIN SODIUM 2 G/100ML
2 INJECTION, SOLUTION INTRAVENOUS
Status: CANCELLED | OUTPATIENT
Start: 2019-03-26

## 2019-03-26 RX ORDER — CEFAZOLIN SODIUM 1 G/3ML
1 INJECTION, POWDER, FOR SOLUTION INTRAMUSCULAR; INTRAVENOUS SEE ADMIN INSTRUCTIONS
Status: CANCELLED | OUTPATIENT
Start: 2019-03-26

## 2019-04-08 ENCOUNTER — APPOINTMENT (OUTPATIENT)
Dept: SURGERY | Facility: PHYSICIAN GROUP | Age: 34
End: 2019-04-08
Payer: COMMERCIAL

## 2019-04-08 ENCOUNTER — ANESTHESIA (OUTPATIENT)
Dept: SURGERY | Facility: CLINIC | Age: 34
End: 2019-04-08
Payer: COMMERCIAL

## 2019-04-08 ENCOUNTER — ANESTHESIA EVENT (OUTPATIENT)
Dept: SURGERY | Facility: CLINIC | Age: 34
End: 2019-04-08
Payer: COMMERCIAL

## 2019-04-08 ENCOUNTER — HOSPITAL ENCOUNTER (OUTPATIENT)
Facility: CLINIC | Age: 34
Discharge: HOME | End: 2019-04-09
Attending: SURGERY | Admitting: PLASTIC SURGERY
Payer: COMMERCIAL

## 2019-04-08 DIAGNOSIS — Z15.01 BRCA GENE MUTATION POSITIVE: Primary | ICD-10-CM

## 2019-04-08 DIAGNOSIS — Z90.13 ACQUIRED ABSENCE OF BOTH BREASTS AND NIPPLES: ICD-10-CM

## 2019-04-08 DIAGNOSIS — Z15.09 BRCA GENE MUTATION POSITIVE: Primary | ICD-10-CM

## 2019-04-08 PROCEDURE — 88307 TISSUE EXAM BY PATHOLOGIST: CPT | Mod: 26,59 | Performed by: SURGERY

## 2019-04-08 PROCEDURE — 36000056 ZZH SURGERY LEVEL 3 1ST 30 MIN: Performed by: SURGERY

## 2019-04-08 PROCEDURE — 71000012 ZZH RECOVERY PHASE 1 LEVEL 1 FIRST HR: Performed by: SURGERY

## 2019-04-08 PROCEDURE — 25000125 ZZHC RX 250: Performed by: SURGERY

## 2019-04-08 PROCEDURE — 25000132 ZZH RX MED GY IP 250 OP 250 PS 637: Performed by: PLASTIC SURGERY

## 2019-04-08 PROCEDURE — 25000125 ZZHC RX 250: Performed by: NURSE ANESTHETIST, CERTIFIED REGISTERED

## 2019-04-08 PROCEDURE — 37000009 ZZH ANESTHESIA TECHNICAL FEE, EACH ADDTL 15 MIN: Performed by: SURGERY

## 2019-04-08 PROCEDURE — 25000128 H RX IP 250 OP 636: Performed by: SURGERY

## 2019-04-08 PROCEDURE — 36000058 ZZH SURGERY LEVEL 3 EA 15 ADDTL MIN: Performed by: SURGERY

## 2019-04-08 PROCEDURE — 71000013 ZZH RECOVERY PHASE 1 LEVEL 1 EA ADDTL HR: Performed by: SURGERY

## 2019-04-08 PROCEDURE — 25000128 H RX IP 250 OP 636: Performed by: NURSE ANESTHETIST, CERTIFIED REGISTERED

## 2019-04-08 PROCEDURE — 25000125 ZZHC RX 250: Performed by: PLASTIC SURGERY

## 2019-04-08 PROCEDURE — 25000128 H RX IP 250 OP 636: Performed by: PLASTIC SURGERY

## 2019-04-08 PROCEDURE — 27810323 ZZHC OR TISSUE EXPANDER OPNP: Performed by: SURGERY

## 2019-04-08 PROCEDURE — 19303 MAST SIMPLE COMPLETE: CPT | Mod: 50 | Performed by: SURGERY

## 2019-04-08 PROCEDURE — 19303 MAST SIMPLE COMPLETE: CPT | Mod: AS | Performed by: PHYSICIAN ASSISTANT

## 2019-04-08 PROCEDURE — 37000008 ZZH ANESTHESIA TECHNICAL FEE, 1ST 30 MIN: Performed by: SURGERY

## 2019-04-08 PROCEDURE — 25800025 ZZH RX 258: Performed by: PLASTIC SURGERY

## 2019-04-08 PROCEDURE — 27210794 ZZH OR GENERAL SUPPLY STERILE: Performed by: SURGERY

## 2019-04-08 PROCEDURE — 25800030 ZZH RX IP 258 OP 636: Performed by: NURSE ANESTHETIST, CERTIFIED REGISTERED

## 2019-04-08 PROCEDURE — 25800030 ZZH RX IP 258 OP 636: Performed by: PLASTIC SURGERY

## 2019-04-08 PROCEDURE — 40000170 ZZH STATISTIC PRE-PROCEDURE ASSESSMENT II: Performed by: SURGERY

## 2019-04-08 PROCEDURE — 88307 TISSUE EXAM BY PATHOLOGIST: CPT | Performed by: SURGERY

## 2019-04-08 RX ORDER — NALOXONE HYDROCHLORIDE 0.4 MG/ML
.1-.4 INJECTION, SOLUTION INTRAMUSCULAR; INTRAVENOUS; SUBCUTANEOUS
Status: DISCONTINUED | OUTPATIENT
Start: 2019-04-08 | End: 2019-04-08 | Stop reason: HOSPADM

## 2019-04-08 RX ORDER — HYDROXYZINE HYDROCHLORIDE 25 MG/1
25 TABLET, FILM COATED ORAL EVERY 6 HOURS PRN
Status: DISCONTINUED | OUTPATIENT
Start: 2019-04-08 | End: 2019-04-09 | Stop reason: HOSPADM

## 2019-04-08 RX ORDER — ACETAMINOPHEN 500 MG
1000 TABLET ORAL ONCE
Status: COMPLETED | OUTPATIENT
Start: 2019-04-08 | End: 2019-04-08

## 2019-04-08 RX ORDER — LIDOCAINE 40 MG/G
CREAM TOPICAL
Status: DISCONTINUED | OUTPATIENT
Start: 2019-04-08 | End: 2019-04-09 | Stop reason: HOSPADM

## 2019-04-08 RX ORDER — CEFAZOLIN SODIUM 1 G/3ML
1 INJECTION, POWDER, FOR SOLUTION INTRAMUSCULAR; INTRAVENOUS SEE ADMIN INSTRUCTIONS
Status: DISCONTINUED | OUTPATIENT
Start: 2019-04-08 | End: 2019-04-08 | Stop reason: HOSPADM

## 2019-04-08 RX ORDER — IBUPROFEN 600 MG/1
600 TABLET, FILM COATED ORAL EVERY 6 HOURS PRN
Status: DISCONTINUED | OUTPATIENT
Start: 2019-04-08 | End: 2019-04-09 | Stop reason: HOSPADM

## 2019-04-08 RX ORDER — MAGNESIUM HYDROXIDE 1200 MG/15ML
LIQUID ORAL PRN
Status: DISCONTINUED | OUTPATIENT
Start: 2019-04-08 | End: 2019-04-08 | Stop reason: HOSPADM

## 2019-04-08 RX ORDER — PROPOFOL 10 MG/ML
INJECTION, EMULSION INTRAVENOUS PRN
Status: DISCONTINUED | OUTPATIENT
Start: 2019-04-08 | End: 2019-04-08

## 2019-04-08 RX ORDER — MEPERIDINE HYDROCHLORIDE 25 MG/ML
12.5 INJECTION INTRAMUSCULAR; INTRAVENOUS; SUBCUTANEOUS
Status: DISCONTINUED | OUTPATIENT
Start: 2019-04-08 | End: 2019-04-08 | Stop reason: HOSPADM

## 2019-04-08 RX ORDER — OXYCODONE HCL 10 MG/1
10 TABLET, FILM COATED, EXTENDED RELEASE ORAL ONCE
Status: COMPLETED | OUTPATIENT
Start: 2019-04-08 | End: 2019-04-08

## 2019-04-08 RX ORDER — EPHEDRINE SULFATE 50 MG/ML
INJECTION, SOLUTION INTRAMUSCULAR; INTRAVENOUS; SUBCUTANEOUS PRN
Status: DISCONTINUED | OUTPATIENT
Start: 2019-04-08 | End: 2019-04-08

## 2019-04-08 RX ORDER — DIPHENHYDRAMINE HYDROCHLORIDE 50 MG/ML
INJECTION INTRAMUSCULAR; INTRAVENOUS PRN
Status: DISCONTINUED | OUTPATIENT
Start: 2019-04-08 | End: 2019-04-08

## 2019-04-08 RX ORDER — LIDOCAINE HYDROCHLORIDE 20 MG/ML
INJECTION, SOLUTION INFILTRATION; PERINEURAL PRN
Status: DISCONTINUED | OUTPATIENT
Start: 2019-04-08 | End: 2019-04-08

## 2019-04-08 RX ORDER — SODIUM CHLORIDE, SODIUM LACTATE, POTASSIUM CHLORIDE, CALCIUM CHLORIDE 600; 310; 30; 20 MG/100ML; MG/100ML; MG/100ML; MG/100ML
INJECTION, SOLUTION INTRAVENOUS CONTINUOUS PRN
Status: DISCONTINUED | OUTPATIENT
Start: 2019-04-08 | End: 2019-04-08

## 2019-04-08 RX ORDER — CITALOPRAM HYDROBROMIDE 20 MG/1
20 TABLET ORAL DAILY
Status: DISCONTINUED | OUTPATIENT
Start: 2019-04-09 | End: 2019-04-09 | Stop reason: HOSPADM

## 2019-04-08 RX ORDER — ACETAMINOPHEN 325 MG/1
650 TABLET ORAL EVERY 6 HOURS PRN
Status: DISCONTINUED | OUTPATIENT
Start: 2019-04-08 | End: 2019-04-09 | Stop reason: HOSPADM

## 2019-04-08 RX ORDER — ONDANSETRON 4 MG/1
4 TABLET, ORALLY DISINTEGRATING ORAL EVERY 6 HOURS PRN
Status: DISCONTINUED | OUTPATIENT
Start: 2019-04-08 | End: 2019-04-09 | Stop reason: HOSPADM

## 2019-04-08 RX ORDER — FENTANYL CITRATE 50 UG/ML
INJECTION, SOLUTION INTRAMUSCULAR; INTRAVENOUS PRN
Status: DISCONTINUED | OUTPATIENT
Start: 2019-04-08 | End: 2019-04-08

## 2019-04-08 RX ORDER — SODIUM CHLORIDE, SODIUM LACTATE, POTASSIUM CHLORIDE, CALCIUM CHLORIDE 600; 310; 30; 20 MG/100ML; MG/100ML; MG/100ML; MG/100ML
INJECTION, SOLUTION INTRAVENOUS CONTINUOUS
Status: DISCONTINUED | OUTPATIENT
Start: 2019-04-08 | End: 2019-04-09

## 2019-04-08 RX ORDER — FENTANYL CITRATE 50 UG/ML
25-50 INJECTION, SOLUTION INTRAMUSCULAR; INTRAVENOUS
Status: DISCONTINUED | OUTPATIENT
Start: 2019-04-08 | End: 2019-04-08 | Stop reason: HOSPADM

## 2019-04-08 RX ORDER — NALOXONE HYDROCHLORIDE 0.4 MG/ML
.1-.4 INJECTION, SOLUTION INTRAMUSCULAR; INTRAVENOUS; SUBCUTANEOUS
Status: DISCONTINUED | OUTPATIENT
Start: 2019-04-08 | End: 2019-04-09 | Stop reason: HOSPADM

## 2019-04-08 RX ORDER — DEXAMETHASONE SODIUM PHOSPHATE 4 MG/ML
INJECTION, SOLUTION INTRA-ARTICULAR; INTRALESIONAL; INTRAMUSCULAR; INTRAVENOUS; SOFT TISSUE PRN
Status: DISCONTINUED | OUTPATIENT
Start: 2019-04-08 | End: 2019-04-08

## 2019-04-08 RX ORDER — SODIUM CHLORIDE, SODIUM LACTATE, POTASSIUM CHLORIDE, CALCIUM CHLORIDE 600; 310; 30; 20 MG/100ML; MG/100ML; MG/100ML; MG/100ML
INJECTION, SOLUTION INTRAVENOUS CONTINUOUS
Status: DISCONTINUED | OUTPATIENT
Start: 2019-04-08 | End: 2019-04-08 | Stop reason: HOSPADM

## 2019-04-08 RX ORDER — BUPIVACAINE HYDROCHLORIDE 2.5 MG/ML
INJECTION, SOLUTION INFILTRATION; PERINEURAL PRN
Status: DISCONTINUED | OUTPATIENT
Start: 2019-04-08 | End: 2019-04-08 | Stop reason: HOSPADM

## 2019-04-08 RX ORDER — HYDROMORPHONE HYDROCHLORIDE 1 MG/ML
0.2 INJECTION, SOLUTION INTRAMUSCULAR; INTRAVENOUS; SUBCUTANEOUS
Status: DISCONTINUED | OUTPATIENT
Start: 2019-04-08 | End: 2019-04-09 | Stop reason: HOSPADM

## 2019-04-08 RX ORDER — BUPIVACAINE HYDROCHLORIDE 2.5 MG/ML
INJECTION, SOLUTION EPIDURAL; INFILTRATION; INTRACAUDAL
Status: DISCONTINUED
Start: 2019-04-08 | End: 2019-04-08 | Stop reason: HOSPADM

## 2019-04-08 RX ORDER — ONDANSETRON 2 MG/ML
INJECTION INTRAMUSCULAR; INTRAVENOUS PRN
Status: DISCONTINUED | OUTPATIENT
Start: 2019-04-08 | End: 2019-04-08

## 2019-04-08 RX ORDER — ONDANSETRON 2 MG/ML
4 INJECTION INTRAMUSCULAR; INTRAVENOUS EVERY 30 MIN PRN
Status: DISCONTINUED | OUTPATIENT
Start: 2019-04-08 | End: 2019-04-08 | Stop reason: HOSPADM

## 2019-04-08 RX ORDER — GLYCOPYRROLATE 0.2 MG/ML
INJECTION, SOLUTION INTRAMUSCULAR; INTRAVENOUS PRN
Status: DISCONTINUED | OUTPATIENT
Start: 2019-04-08 | End: 2019-04-08

## 2019-04-08 RX ORDER — ONDANSETRON 4 MG/1
4 TABLET, ORALLY DISINTEGRATING ORAL EVERY 30 MIN PRN
Status: DISCONTINUED | OUTPATIENT
Start: 2019-04-08 | End: 2019-04-08 | Stop reason: HOSPADM

## 2019-04-08 RX ORDER — CEFAZOLIN SODIUM 1 G/3ML
1 INJECTION, POWDER, FOR SOLUTION INTRAMUSCULAR; INTRAVENOUS EVERY 8 HOURS
Status: COMPLETED | OUTPATIENT
Start: 2019-04-08 | End: 2019-04-08

## 2019-04-08 RX ORDER — ONDANSETRON 2 MG/ML
4 INJECTION INTRAMUSCULAR; INTRAVENOUS EVERY 6 HOURS PRN
Status: DISCONTINUED | OUTPATIENT
Start: 2019-04-08 | End: 2019-04-09 | Stop reason: HOSPADM

## 2019-04-08 RX ORDER — PROCHLORPERAZINE MALEATE 10 MG
10 TABLET ORAL EVERY 6 HOURS PRN
Status: DISCONTINUED | OUTPATIENT
Start: 2019-04-08 | End: 2019-04-09 | Stop reason: HOSPADM

## 2019-04-08 RX ORDER — CEFAZOLIN SODIUM 2 G/100ML
2 INJECTION, SOLUTION INTRAVENOUS
Status: COMPLETED | OUTPATIENT
Start: 2019-04-08 | End: 2019-04-08

## 2019-04-08 RX ORDER — HYDROCODONE BITARTRATE AND ACETAMINOPHEN 5; 325 MG/1; MG/1
1-2 TABLET ORAL EVERY 4 HOURS PRN
Status: DISCONTINUED | OUTPATIENT
Start: 2019-04-08 | End: 2019-04-09 | Stop reason: HOSPADM

## 2019-04-08 RX ORDER — PROPOFOL 10 MG/ML
INJECTION, EMULSION INTRAVENOUS CONTINUOUS PRN
Status: DISCONTINUED | OUTPATIENT
Start: 2019-04-08 | End: 2019-04-08

## 2019-04-08 RX ADMIN — Medication 0.2 MG: at 13:10

## 2019-04-08 RX ADMIN — OXYCODONE HYDROCHLORIDE 10 MG: 10 TABLET, FILM COATED, EXTENDED RELEASE ORAL at 06:33

## 2019-04-08 RX ADMIN — DIPHENHYDRAMINE HYDROCHLORIDE 12.5 MG: 50 INJECTION, SOLUTION INTRAMUSCULAR; INTRAVENOUS at 07:55

## 2019-04-08 RX ADMIN — DEXMEDETOMIDINE HYDROCHLORIDE 8 MCG: 100 INJECTION, SOLUTION INTRAVENOUS at 08:18

## 2019-04-08 RX ADMIN — PROPOFOL: 10 INJECTION, EMULSION INTRAVENOUS at 10:04

## 2019-04-08 RX ADMIN — ONDANSETRON 4 MG: 2 INJECTION INTRAMUSCULAR; INTRAVENOUS at 07:54

## 2019-04-08 RX ADMIN — FENTANYL CITRATE 50 MCG: 50 INJECTION, SOLUTION INTRAMUSCULAR; INTRAVENOUS at 07:54

## 2019-04-08 RX ADMIN — HYDROCODONE BITARTRATE AND ACETAMINOPHEN 1 TABLET: 5; 325 TABLET ORAL at 15:41

## 2019-04-08 RX ADMIN — CEFAZOLIN 1 G: 1 INJECTION, POWDER, FOR SOLUTION INTRAMUSCULAR; INTRAVENOUS at 09:55

## 2019-04-08 RX ADMIN — LIDOCAINE HYDROCHLORIDE 40 MG: 20 INJECTION, SOLUTION INFILTRATION; PERINEURAL at 07:54

## 2019-04-08 RX ADMIN — FENTANYL CITRATE 50 MCG: 50 INJECTION, SOLUTION INTRAMUSCULAR; INTRAVENOUS at 08:54

## 2019-04-08 RX ADMIN — Medication 5 MG: at 09:28

## 2019-04-08 RX ADMIN — HYDROXYZINE HYDROCHLORIDE 25 MG: 25 TABLET, FILM COATED ORAL at 14:47

## 2019-04-08 RX ADMIN — DEXAMETHASONE SODIUM PHOSPHATE 4 MG: 4 INJECTION, SOLUTION INTRA-ARTICULAR; INTRALESIONAL; INTRAMUSCULAR; INTRAVENOUS; SOFT TISSUE at 07:54

## 2019-04-08 RX ADMIN — SODIUM CHLORIDE, POTASSIUM CHLORIDE, SODIUM LACTATE AND CALCIUM CHLORIDE: 600; 310; 30; 20 INJECTION, SOLUTION INTRAVENOUS at 07:50

## 2019-04-08 RX ADMIN — PROPOFOL 175 MCG/KG/MIN: 10 INJECTION, EMULSION INTRAVENOUS at 07:54

## 2019-04-08 RX ADMIN — HYDROXYZINE HYDROCHLORIDE 25 MG: 25 TABLET, FILM COATED ORAL at 20:57

## 2019-04-08 RX ADMIN — FENTANYL CITRATE 50 MCG: 50 INJECTION, SOLUTION INTRAMUSCULAR; INTRAVENOUS at 10:13

## 2019-04-08 RX ADMIN — FENTANYL CITRATE 50 MCG: 50 INJECTION, SOLUTION INTRAMUSCULAR; INTRAVENOUS at 08:13

## 2019-04-08 RX ADMIN — SODIUM CHLORIDE, POTASSIUM CHLORIDE, SODIUM LACTATE AND CALCIUM CHLORIDE: 600; 310; 30; 20 INJECTION, SOLUTION INTRAVENOUS at 20:32

## 2019-04-08 RX ADMIN — DEXMEDETOMIDINE HYDROCHLORIDE 4 MCG: 100 INJECTION, SOLUTION INTRAVENOUS at 08:55

## 2019-04-08 RX ADMIN — FENTANYL CITRATE 50 MCG: 50 INJECTION, SOLUTION INTRAMUSCULAR; INTRAVENOUS at 09:09

## 2019-04-08 RX ADMIN — SODIUM CHLORIDE, POTASSIUM CHLORIDE, SODIUM LACTATE AND CALCIUM CHLORIDE: 600; 310; 30; 20 INJECTION, SOLUTION INTRAVENOUS at 10:17

## 2019-04-08 RX ADMIN — CEPHALEXIN 250 MG: 250 CAPSULE ORAL at 20:57

## 2019-04-08 RX ADMIN — PROPOFOL 170 MG: 10 INJECTION, EMULSION INTRAVENOUS at 07:54

## 2019-04-08 RX ADMIN — PROPOFOL: 10 INJECTION, EMULSION INTRAVENOUS at 08:51

## 2019-04-08 RX ADMIN — CEFAZOLIN SODIUM 2 G: 2 INJECTION, SOLUTION INTRAVENOUS at 08:00

## 2019-04-08 RX ADMIN — DEXMEDETOMIDINE HYDROCHLORIDE 4 MCG: 100 INJECTION, SOLUTION INTRAVENOUS at 09:02

## 2019-04-08 RX ADMIN — CEFAZOLIN 1 G: 1 INJECTION, POWDER, FOR SOLUTION INTRAMUSCULAR; INTRAVENOUS at 18:58

## 2019-04-08 RX ADMIN — GLYCOPYRROLATE 0.2 MG: 0.2 INJECTION, SOLUTION INTRAMUSCULAR; INTRAVENOUS at 08:14

## 2019-04-08 RX ADMIN — HYDROCODONE BITARTRATE AND ACETAMINOPHEN 1 TABLET: 5; 325 TABLET ORAL at 19:49

## 2019-04-08 RX ADMIN — ACETAMINOPHEN 1000 MG: 500 TABLET, FILM COATED ORAL at 06:33

## 2019-04-08 RX ADMIN — MIDAZOLAM 2 MG: 1 INJECTION INTRAMUSCULAR; INTRAVENOUS at 07:51

## 2019-04-08 ASSESSMENT — MIFFLIN-ST. JEOR: SCORE: 1158.57

## 2019-04-08 ASSESSMENT — LIFESTYLE VARIABLES: TOBACCO_USE: 1

## 2019-04-08 NOTE — ANESTHESIA POSTPROCEDURE EVALUATION
[facility]    Procedure(s):  BILATERAL PROPHYLACTIC MASTECTOMY (JUAN)  BILATERAL TISSUE EXPANDERS (GILDARDO)    Diagnosis:FAMILY HISTORY OF BREAST CANCER  Diagnosis Additional Information: No value filed.    Anesthesia Type:  General, LMA    Note:  Anesthesia Post Evaluation    Patient location during evaluation: PACU  Patient participation: Able to fully participate in evaluation  Level of consciousness: awake and alert  Pain management: adequate  Airway patency: patent  Cardiovascular status: acceptable  Respiratory status: acceptable  Hydration status: acceptable  PONV: none     Anesthetic complications: None          Last vitals:  Vitals:    04/08/19 1215 04/08/19 1230 04/08/19 1300   BP: 98/61 97/61 95/60   Pulse: 59 57 50   Resp: 12 12 12   Temp: 36.7  C (98.1  F) 36.9  C (98.4  F) 36.6  C (97.9  F)   SpO2: 92% 93% 97%         Electronically Signed By: Adarsh Deal MD  April 8, 2019  1:33 PM

## 2019-04-08 NOTE — ANESTHESIA PREPROCEDURE EVALUATION
Anesthesia Pre-Procedure Evaluation    Patient: Celeste Arguello   MRN: [id number] : 1985          Preoperative Diagnosis: FAMILY HISTORY OF BREAST CANCER    Procedure(s):  BILATERAL PROPHYLACTIC MASTECTOMY (JUAN)  BILATERAL TISSUE EXPANDERS (GILDARDO)    Past Medical History:   Diagnosis Date     BRCA1 gene mutation positive 6/15/2018    BRCA1 c.181T>G (p.C61G) tested at [facility] 2018     Past Surgical History:   Procedure Laterality Date     CYSTOSCOPY N/A 2018    Procedure: CYSTOSCOPY;;  Surgeon: Marybeth Grande MD;  Location: [facility] OR     HYSTERECTOMY TOTAL ABDOMINAL, BILATERAL SALPINGO-OOPHORECTOMY, NODE DISSECTION, COMBINED Bilateral 2018    Procedure: COMBINED HYSTERECTOMY TOTAL ABDOMINAL, SALPINGO-OOPHORECTOMY, NODE DISSECTION;;  Surgeon: Marybeth Grande MD;  Location: [facility] OR     LAPAROSCOPIC HYSTERECTOMY TOTAL, BILATERAL SALPINGO-OOPHORECTOMY, NODE DISSECTION, COMBINED N/A 2018    Procedure: COMBINED LAPAROSCOPIC HYSTERECTOMY TOTAL, SALPINGO-OOPHORECTOMY, NODE DISSECTION;  Laparoscopic Removal Of Uterus, Cervix, Both Ovaries And Fallopian Tubes, Cystoscopy ;  Surgeon: Marybeth Grande MD;  Location: [facility] OR     LAPAROSCOPY DIAGNOSTIC (GYN)  2006    endometriosis     urethral reimplant  age 6       Anesthesia Evaluation     .             ROS/MED HX    ENT/Pulmonary:     (+)tobacco use, Past use , . .   (-) sleep apnea   Neurologic:       Cardiovascular:         METS/Exercise Tolerance:     Hematologic:         Musculoskeletal:         GI/Hepatic:        (-) GERD   Renal/Genitourinary: Comment: Endometriosis        Endo:         Psychiatric:         Infectious Disease:         Malignancy:         Other:                          Physical Exam  Normal systems: cardiovascular, pulmonary and dental    Airway   Mallampati: I  TM distance: >3 FB  Neck ROM: full    Dental     Cardiovascular       Pulmonary             Lab Results   Component Value Date    WBC  "7.0 03/25/2019    HGB 13.6 03/25/2019    HCT 41.1 03/25/2019     (L) 03/25/2019     08/24/2018    POTASSIUM 3.5 08/24/2018    CHLORIDE 108 08/24/2018    CO2 29 08/24/2018    BUN 7 08/24/2018    CR 0.78 08/24/2018    GLC 75 08/24/2018    ANTON 8.5 08/24/2018    ALBUMIN 3.6 08/24/2018    PROTTOTAL 6.6 (L) 08/24/2018    ALT 13 08/24/2018    AST 13 08/24/2018    ALKPHOS 45 08/24/2018    BILITOTAL 0.6 08/24/2018    TSH 1.38 12/11/2012    HCG Negative 09/12/2018       Preop Vitals  BP Readings from Last 3 Encounters:   04/08/19 107/63   03/25/19 116/76   12/04/18 90/60    Pulse Readings from Last 3 Encounters:   03/25/19 81   12/04/18 65   11/30/18 66      Resp Readings from Last 3 Encounters:   04/08/19 16   03/25/19 16   09/12/18 16    SpO2 Readings from Last 3 Encounters:   04/08/19 97%   03/25/19 98%   09/21/18 98%      Temp Readings from Last 1 Encounters:   04/08/19 35.9  C (96.6  F) (Oral)    Ht Readings from Last 1 Encounters:   04/08/19 1.549 m (5' 1\")      Wt Readings from Last 1 Encounters:   04/08/19 51.6 kg (113 lb 12.8 oz)    Estimated body mass index is 21.5 kg/m  as calculated from the following:    Height as of this encounter: 1.549 m (5' 1\").    Weight as of this encounter: 51.6 kg (113 lb 12.8 oz).       Anesthesia Plan      History & Physical Review  History and physical reviewed and following examination; no interval change.    ASA Status:  1 .    NPO Status:  > 8 hours    Plan for General and LMA with Intravenous and Propofol induction. Maintenance will be Balanced.    PONV prophylaxis:  Ondansetron (or other 5HT-3) and Dexamethasone or Solumedrol  12.5mg Benadryl, 4mg Decadron and Zofran      Postoperative Care  Postoperative pain management:  IV analgesics and Oral pain medications.      Consents  Anesthetic plan, risks, benefits and alternatives discussed with:  Patient..                 Adarsh Deal MD  "

## 2019-04-08 NOTE — PROGRESS NOTES
Admission medication history interview status for the 4/8/2019  admission is complete. See EPIC admission navigator for prior to admission medications     Medication history source reliability:Good    Medication history interview source(s):Patient    Medication history resources (including written lists, pill bottles, clinic record):None    Primary pharmacy.[facility]    Additional medication history information not noted on PTA med list :None    Time spent in this activity: 40 minutes    Prior to Admission medications    Medication Sig Last Dose Taking? Auth Provider   citalopram (CELEXA) 20 MG tablet Take 1 tablet (20 mg) by mouth daily 4/8/2019 at 0515 Yes Delores Camarena APRN CNM   estrogen conj (PREMARIN) 0.3 MG tablet Take 1 tablet (0.3 mg) by mouth daily 4/8/2019 at 0515 Yes Feliciano Perez APRN CNM   meclizine (ANTIVERT) 25 MG tablet Take 1 tablet (25 mg) by mouth 3 times daily as needed for dizziness More than a month at PRN Yes Carolyn San APRN CNP   ondansetron (ZOFRAN-ODT) 4 MG ODT tab Take 1 tablet (4 mg) by mouth every 8 hours as needed for nausea More than a Month at PRN Yes Carolyn San APRN CNP

## 2019-04-08 NOTE — ANESTHESIA CARE TRANSFER NOTE
Patient: Celeste [facility] Saundra    Procedure(s):  BILATERAL PROPHYLACTIC MASTECTOMY (JUAN)  BILATERAL TISSUE EXPANDERS (GILDARDO)    Diagnosis: FAMILY HISTORY OF BREAST CANCER  Diagnosis Additional Information: No value filed.    Anesthesia Type:   General, LMA     Note:  Airway :Face Mask  Patient transferred to:PACU  Handoff Report: Identifed the Patient, Identified the Reponsible Provider, Reviewed the pertinent medical history, Discussed the surgical course, Reviewed Intra-OP anesthesia mangement and issues during anesthesia, Set expectations for post-procedure period and Allowed opportunity for questions and acknowledgement of understanding      Vitals: (Last set prior to Anesthesia Care Transfer)    CRNA VITALS  4/8/2019 1011 - 4/8/2019 1046      4/8/2019             Pulse:  60    SpO2:  100 %    Resp Rate (observed):  4  (Abnormal)     Resp Rate (set):  10                Electronically Signed By: ENRIQUE Denis CRNA  April 8, 2019  10:46 AM

## 2019-04-08 NOTE — OP NOTE
[facility] Breast Surgery Operative Note    PREOPERATIVE DIAGNOSIS:  BRCA 1 positivity    POSTOPERATIVE DIAGNOSIS: same    PROCEDURE:    1. Bilateral skin sparing prophylactic mastectomy   2. Reconstruction per Dr. Gee, please see their operative report for details regarding their portion of the procedure.     SURGEON:  Roseann Boyd MD    ASSISTANT:  Dalila Vargas PA-C  The physician s assistant was medically necessary for their expertise in retraction and suctioning.    ANESTHESIA:  General.    BLOOD LOSS: 20ml    FINDINGS: uneventful bilateral prophylactic mastectomies    INDICATIONS:   Celeste Arguello is a 33 year old female who was seen in consultation at the request of Dr. Gee for evaluation of BRCA1 positivity. She has a significant family history of breast and ovarian cancer and had testing last summer which revealed BRCA 1 positivity. She met with Miesha Purdy then and ultimately had MEHRDAD/BSO on 9/12/2018 with Dr. Huizar. She recovered well following surgery and feels much better without chronic pelvic pain (had endometriosis/PCOS). She had a screening mammogram in July 2018 which was negative. She has a screening breast MRI scheduled for 12/19/2018. She reports no new breast concerns. No breast pain. No masses. No nipple discharge or drainage. No prior breast surgery or biopsies. She elected to proceed with bilateral prophylactic mastectomies with immediate reconstruction.       DETAILS OF PROCEDURE:     The patient was taken to the operating room and placed in supine position and general anesthesia by endotracheal tube.   Perioperative antiobiotics were given. SCDs were placed on the lower extremities. A quinn catheter was placed using sterile technique.   The patient was also marked by Dr. Gee with bilateral skin sparing radial incisions.     The breasts were prepped and draped in the usual sterile fashion.  The timeout procedure was performed.      Starting on the left side, a  periareolar elliptical skin incision was made following the pre operative markings with a #10 scalpel blade and deepened with electrocautery.  The superior flap was raised with electrocautery up to the top edge of the breast tissue just under the clavicle.  The inferior flap was similarly raised using the cautery down to the inframammary fold.  Flaps were raised medially to the lateral aspect of the sterum and laterally to the lateral chest wall. The breast tissue was then elevated off of the pectoralis fascia with cautery. Once the breast was removed, it was oriented with sutures with a short suture superior and long suture lateral.  The breast was sent to pathology to be placed in formalin and have routine pathology exam.  The left side was packed with a saline-wetted lap pad and covered with a dry towel while attention was turned to the right breast.     A similar  incision was made on the right side with a #10 scalpel blade and deepened with electrocautery.  Again, the flaps were raised with electrocautery and the breast was dissected away from the pectoralis fascia.  The tissue was oriented with a short suture superior and long suture lateral.  The breast was then sent to pathology and placed in formalin for permanent section.        Dr. Gee then performed their portion of the procedure with reconstruction. Please see their operative report for details.     Roseann Byod MD

## 2019-04-08 NOTE — OR NURSING
Report called to Maki KWOK. Patient transferred to [facility] on cart with belongings. Family updated on transfer.

## 2019-04-09 VITALS
SYSTOLIC BLOOD PRESSURE: 98 MMHG | HEIGHT: 61 IN | WEIGHT: 113.8 LBS | OXYGEN SATURATION: 95 % | RESPIRATION RATE: 16 BRPM | DIASTOLIC BLOOD PRESSURE: 58 MMHG | BODY MASS INDEX: 21.49 KG/M2 | HEART RATE: 53 BPM | TEMPERATURE: 98.4 F

## 2019-04-09 LAB
COPATH REPORT: NORMAL
GLUCOSE BLDC GLUCOMTR-MCNC: 88 MG/DL (ref 70–99)

## 2019-04-09 PROCEDURE — 25000128 H RX IP 250 OP 636: Performed by: PLASTIC SURGERY

## 2019-04-09 PROCEDURE — 25800030 ZZH RX IP 258 OP 636: Performed by: PLASTIC SURGERY

## 2019-04-09 PROCEDURE — 82962 GLUCOSE BLOOD TEST: CPT

## 2019-04-09 PROCEDURE — 25000132 ZZH RX MED GY IP 250 OP 250 PS 637: Performed by: PLASTIC SURGERY

## 2019-04-09 RX ORDER — HYDROCODONE BITARTRATE AND ACETAMINOPHEN 5; 325 MG/1; MG/1
1-2 TABLET ORAL EVERY 4 HOURS PRN
Qty: 40 TABLET | Refills: 0 | Status: SHIPPED | OUTPATIENT
Start: 2019-04-09 | End: 2019-04-09

## 2019-04-09 RX ORDER — HYDROCODONE BITARTRATE AND ACETAMINOPHEN 5; 325 MG/1; MG/1
1-2 TABLET ORAL EVERY 4 HOURS PRN
Qty: 20 TABLET | Refills: 0 | Status: SHIPPED | OUTPATIENT
Start: 2019-04-09 | End: 2019-12-09

## 2019-04-09 RX ORDER — HYDROXYZINE HYDROCHLORIDE 25 MG/1
25 TABLET, FILM COATED ORAL EVERY 6 HOURS PRN
Qty: 40 TABLET | Refills: 1 | Status: SHIPPED | OUTPATIENT
Start: 2019-04-09 | End: 2019-12-09

## 2019-04-09 RX ADMIN — HYDROCODONE BITARTRATE AND ACETAMINOPHEN 1 TABLET: 5; 325 TABLET ORAL at 14:08

## 2019-04-09 RX ADMIN — HYDROXYZINE HYDROCHLORIDE 25 MG: 25 TABLET, FILM COATED ORAL at 10:08

## 2019-04-09 RX ADMIN — SODIUM CHLORIDE 500 ML: 9 INJECTION, SOLUTION INTRAVENOUS at 01:25

## 2019-04-09 RX ADMIN — HYDROCODONE BITARTRATE AND ACETAMINOPHEN 1 TABLET: 5; 325 TABLET ORAL at 05:33

## 2019-04-09 RX ADMIN — CITALOPRAM HYDROBROMIDE 20 MG: 20 TABLET, FILM COATED ORAL at 08:04

## 2019-04-09 RX ADMIN — IBUPROFEN 600 MG: 600 TABLET, FILM COATED ORAL at 10:08

## 2019-04-09 RX ADMIN — CEPHALEXIN 250 MG: 250 CAPSULE ORAL at 14:08

## 2019-04-09 RX ADMIN — SODIUM CHLORIDE, POTASSIUM CHLORIDE, SODIUM LACTATE AND CALCIUM CHLORIDE: 600; 310; 30; 20 INJECTION, SOLUTION INTRAVENOUS at 07:40

## 2019-04-09 RX ADMIN — HYDROCODONE BITARTRATE AND ACETAMINOPHEN 1 TABLET: 5; 325 TABLET ORAL at 00:18

## 2019-04-09 RX ADMIN — IBUPROFEN 600 MG: 600 TABLET, FILM COATED ORAL at 15:55

## 2019-04-09 RX ADMIN — CEPHALEXIN 250 MG: 250 CAPSULE ORAL at 05:33

## 2019-04-09 RX ADMIN — HYDROXYZINE HYDROCHLORIDE 25 MG: 25 TABLET, FILM COATED ORAL at 03:57

## 2019-04-09 RX ADMIN — HYDROXYZINE HYDROCHLORIDE 25 MG: 25 TABLET, FILM COATED ORAL at 15:55

## 2019-04-09 NOTE — PLAN OF CARE
Alert. VSS ex. Asymptomatic hypotensive with SBP in 80s this morning, improved thereafter to 90s. Ace wrap own. RAINA to suction with SS output. Pain well controlled. Ambulating in hallway. Possible discharge home this evening. Will continue to monitor.

## 2019-04-09 NOTE — PLAN OF CARE
A&Ox4. Low BP and teo, paged for bolus, pt asymptomatic. Up SBA. Tolerating regular diet. Voiding adequately. ACE wrap in place, CDI. Left and right RAINA stripped per order. Pain controlled with atarax and norco. Slept between cares.

## 2019-04-09 NOTE — DISCHARGE SUMMARY
Discharge Summary    Celeste Arguello MRN# [id number]   YOB: 1985 Age: 34 year old     Date of Admission:  4/8/2019  Date of Discharge:  4/9/2019  Admitting Physician:  Emily Gee MD  Discharge Physician:  Emily Gee MD          Discharging Service:  Plastic and Reconstructive Surgery      HOSPITAL COURSE:  Celeste Arguello was admitted to the hospital after undergoing bilateral mastectomies with TE reconstruction on 4/8/2019   Her postoperative course was unremarkable other than hypotension that was largely asymptomatic.  At the time of discharge, she was tolerating a regular diet, had adequate pain control on oral narcotics, and had been instructed in the care of her drains.      Medications Prior to Admission   Medication Sig Dispense Refill Last Dose     citalopram (CELEXA) 20 MG tablet Take 1 tablet (20 mg) by mouth daily 90 tablet 4 4/8/2019 at 0515     estrogen conj (PREMARIN) 0.3 MG tablet Take 1 tablet (0.3 mg) by mouth daily 90 tablet 3 4/8/2019 at 0515     meclizine (ANTIVERT) 25 MG tablet Take 1 tablet (25 mg) by mouth 3 times daily as needed for dizziness 60 tablet 1 More than a month at PRN     ondansetron (ZOFRAN-ODT) 4 MG ODT tab Take 1 tablet (4 mg) by mouth every 8 hours as needed for nausea 18 tablet 1 More than a Month at PRN        PE:  All incisions were clean, dry and intact.  No hematoma.  RAINA drains were in place and functioning.     Current Discharge Medication List      START taking these medications    Details   cephALEXin (KEFLEX) 250 MG capsule Take 1 capsule (250 mg) by mouth every 8 hours for 7 days  Qty: 21 capsule, Refills: 0    Associated Diagnoses: BRCA gene mutation positive; Acquired absence of both breasts and nipples      HYDROcodone-acetaminophen (NORCO) 5-325 MG tablet Take 1-2 tablets by mouth every 4 hours as needed for severe pain  Qty: 20 tablet, Refills: 0    Associated Diagnoses: BRCA gene mutation positive; Acquired  absence of both breasts and nipples      hydrOXYzine (ATARAX) 25 MG tablet Take 1 tablet (25 mg) by mouth every 6 hours as needed for other (adjuvant pain)  Qty: 40 tablet, Refills: 1    Associated Diagnoses: BRCA gene mutation positive; Acquired absence of both breasts and nipples         CONTINUE these medications which have NOT CHANGED    Details   citalopram (CELEXA) 20 MG tablet Take 1 tablet (20 mg) by mouth daily  Qty: 90 tablet, Refills: 4    Associated Diagnoses: Adjustment disorder with anxious mood      estrogen conj (PREMARIN) 0.3 MG tablet Take 1 tablet (0.3 mg) by mouth daily  Qty: 90 tablet, Refills: 3    Associated Diagnoses: Surgical menopause      meclizine (ANTIVERT) 25 MG tablet Take 1 tablet (25 mg) by mouth 3 times daily as needed for dizziness  Qty: 60 tablet, Refills: 1    Associated Diagnoses: Benign paroxysmal positional vertigo, unspecified laterality      ondansetron (ZOFRAN-ODT) 4 MG ODT tab Take 1 tablet (4 mg) by mouth every 8 hours as needed for nausea  Qty: 18 tablet, Refills: 1    Associated Diagnoses: Benign paroxysmal positional vertigo, unspecified laterality               PLAN:  The patient has been instructed to follow up with Dr. Gee in 1 week.  She should record drain outputs daily.  She may shower with incisions and drains uncovered. The patient may resume her home medications and was discharged with additional prescription for pain medicines.         Emily Gee MD  Plastic Surgery  [facility]  [phone]

## 2019-04-09 NOTE — PLAN OF CARE
Ibuprofen and atarax given prior to discharge. AVS given to pt, instructions provided, questions answered, Rx with pt. Pt requested to walk with boyfriend to car for discharge home. Stable.

## 2019-04-09 NOTE — PROGRESS NOTES
Plastic Surgery    Patient feeling well.  Anxious to discharge.    PE: Temp: 98.5  F (36.9  C) Temp src: Oral BP: 94/49 Pulse: 59 Heart Rate: 53 Resp: 16 SpO2: 96 % O2 Device: None (Room air)      Incisions intact, drains functioning; no hematoma    A/P:  Discharge to home.    Emily Gee MD  Plastic Surgery  [facility]  [phone] (office)

## 2019-04-09 NOTE — PROGRESS NOTES
"General Surgery Progress Note    Subjective: pt reports she is doing ok. No dizziness. Pain is improving each time she is out of bed. Feels ready for discharge.     Objective: BP (!) 85/50 (BP Location: Right arm)   Pulse 55   Temp 98.3  F (36.8  C)   Resp 16   Ht 1.549 m (5' 1\")   Wt 51.6 kg (113 lb 12.8 oz)   LMP 12/17/2016   SpO2 94%   BMI 21.50 kg/m    Gen: alert, no distress  CV: RRR  Chest: no hematomas or ecchymosis. Skin flaps look good.   Pulm: nonlabored breathing  Abd: soft, nt.   Ext: WWP    Assessment/Plan:   Celeste Arguello  is a 34 year old female POD 1 s/p bilateral prophylactic mastectomies. Doing well.   - ready for discharge later today  - follow up with me in 2 weeks  - RAINA drain cullens    Roseann Boyd MD  [facility]  [phone]            "

## 2019-04-09 NOTE — PROVIDER NOTIFICATION
MD Notification    Notified Person: MD     Notified Person Name: Gerard, MD    Notification Date/Time: 0545 4/9    Notification Interaction: paged    Purpose of Notification: FYI-pt blood pressure still remaining 80s/50s, after 500ml bolus given. pt still asymptomatic. Denies lightheadedness/dizziness. Voiding adequately. Adequate PO intake.     Orders Received: continue to monitor

## 2019-04-09 NOTE — PLAN OF CARE
Pt A&O. VSS on RA (BP slightly soft and bradycardic). ACE wrap in place, CDI. JPs (left and right) intact with dark red output, stripped. LS clear. BS WNL. Good urine output. Up with assist of 1. Pain managed with Norco and Atarax.

## 2019-04-09 NOTE — PROVIDER NOTIFICATION
MD Notification    Notified Person: MD    Notified Person Name: MD Gerard    Notification Date/Time: 0110 4/9/2019    Notification Interaction: telephone    Purpose of Notification: low blood pressure, pt asymptomatic.     Orders Received:  500ml Bolus NS over 1 hour     Comments:

## 2019-04-09 NOTE — DISCHARGE INSTRUCTIONS
[facility]  Discharge Instructions: Post-Operative Breast Surgery    ACTIVITY    Increase your activity gradually.  Avoid strenuous physical activity or heavy lifting greater than 15-20 lbs. for 1-2 weeks with arm on the surgery side.  You may climb stairs.    Gentle rotation and stretching of your arms and shoulders will prevent joint stiffness.    You may drive without restrictions when you are not using any prescription pain medication and comfortable in a car.    You may return to work/school when you are comfortable without any prescription pain medication.    WOUND CARE    You may remove your bandage and shower 48 hours after the surgery.  Pat your incisions dry and leave open to air.  Re-apply dressing (Band-aid or gauze/tape) as needed for drainage.    You may have steri-strips (looks like tape) or Dermabond (looks like glue) on your incision.  Leave it alone, it will peel up and fall off on its own.     Do not soak your incisions in a tub or pool for 2 weeks.     Wear a supportive bra for 1-2 weeks, day and night.    DIET    Return to the diet you were on before surgery.    Drink plenty of liquids to stay hydrated.    PAIN    Expect some tenderness and discomfort at the incision site(s).  Use the prescribed pain medication at your discretion.  Expect gradual resolution of your pain over several days.    You may take ibuprofen with food (unless you have been told not to) instead of or in addition to your prescribed pain medication.  If you are taking Norco or Percocet, do not take any additional acetaminophen/APAP/Tylenol.    Do not drink alcohol or drive while you are taking pain medications.    You may apply ice to your incisions in 20 minute intervals as needed for the next 48 hours.  After that time, consider switching to heat if you prefer.    RETURN APPOINTMENT    Follow up with your surgeon in 2 weeks.  Please call the office at [phone] to schedule your  appointment.    CALL OUR OFFICE IF YOU HAVE:     Chills or fever above 101.5 F.    Increased redness or drainage at your incisions.    Significant bleeding.    Pain not relieved by your pain medication or rest.    Increasing pain after the first 48 hours.    Any other concerns or questions.    HELPFUL HINTS    Pain medications can cause constipation.  Limit use when possible.  Take over the counter stool softener/stimulant, such as Colace or Senna, with plenty of water.  You may take a mild over the counter laxative, such as Miralax or a suppository, as needed.  Revised August 2017

## 2019-04-12 ENCOUNTER — TELEPHONE (OUTPATIENT)
Dept: SURGERY | Facility: CLINIC | Age: 34
End: 2019-04-12

## 2019-04-12 NOTE — TELEPHONE ENCOUNTER
Celeste is s/p bilateral skin sparing prophylactic mastectomy on 4/12/2019 with Dr. Boyd (reconstruction per Dr. Gee). Post op call placed to patient. Left message requesting call back.    Alina ZAYASN, RN, OCN  Oncology Care Coordinator  [facility]  [phone]

## 2019-04-23 ENCOUNTER — OFFICE VISIT (OUTPATIENT)
Dept: SURGERY | Facility: CLINIC | Age: 34
End: 2019-04-23
Payer: COMMERCIAL

## 2019-04-23 DIAGNOSIS — Z09 SURGERY FOLLOW-UP EXAMINATION: Primary | ICD-10-CM

## 2019-04-23 PROCEDURE — 99024 POSTOP FOLLOW-UP VISIT: CPT | Performed by: SURGERY

## 2019-04-23 NOTE — PATIENT INSTRUCTIONS
You will receive a reminder call next spring to schedule your annual follow-up with Dr. Roseann Boyd.

## 2019-04-23 NOTE — PROGRESS NOTES
[facility] Breast Surgery Postoperative Note    S: Celeste reports she is doing well since surgery. She had her drains out last week and pain improved significantly since then. She will see Dr. Gee on Thursday for her first fill.     Breasts: bilateral mastectomy incisions are healing well. No erythema or induration. Drain sites well healed. No ecchymosis or hematomas.    Pathology:   SPECIMEN(S):   A: Left breast tissue   B: Right breast tissue     FINAL DIAGNOSIS:   A: Breast, left, prophylactic mastectomy: Negative for malignancy.     B: Breast, right, prophylactic mastectomy: Negative for malignancy.       A/P  Celeste Arguello is recovering from a bilateral prophylactic risk reducing mastectomies with immediate reconstruction on 4/8/2019. Dr. Gee performed immediate reconstruction with placement of bilateral tissue expanders. Her pathology was benign. She will follow up with me in one year for clinical breast exam.       Thank you for the opportunity to help in her care.    Roseann Boyd  [facility]  [phone]    Please route or send letter to:  Primary Care Provider (PCP) and Referring Provider

## 2019-07-03 DIAGNOSIS — F43.22 ADJUSTMENT DISORDER WITH ANXIOUS MOOD: ICD-10-CM

## 2019-07-03 RX ORDER — CITALOPRAM HYDROBROMIDE 20 MG/1
20 TABLET ORAL DAILY
Qty: 30 TABLET | Refills: 0 | Status: SHIPPED | OUTPATIENT
Start: 2019-07-03 | End: 2019-07-21

## 2019-07-03 NOTE — TELEPHONE ENCOUNTER
Tried to reach [facility] but received voicemail.  Left message that refill request was received and a one month supply can be sent to pharmacy but office visit is due for further refills. Please call [phone] to schedule.

## 2019-07-12 ENCOUNTER — TELEPHONE (OUTPATIENT)
Dept: ONCOLOGY | Facility: CLINIC | Age: 34
End: 2019-07-12

## 2019-07-19 ENCOUNTER — OFFICE VISIT (OUTPATIENT)
Dept: FAMILY MEDICINE | Facility: CLINIC | Age: 34
End: 2019-07-19
Payer: COMMERCIAL

## 2019-07-19 VITALS
TEMPERATURE: 97.5 F | BODY MASS INDEX: 21.6 KG/M2 | OXYGEN SATURATION: 97 % | HEART RATE: 70 BPM | DIASTOLIC BLOOD PRESSURE: 60 MMHG | HEIGHT: 62 IN | WEIGHT: 117.4 LBS | RESPIRATION RATE: 16 BRPM | SYSTOLIC BLOOD PRESSURE: 90 MMHG

## 2019-07-19 DIAGNOSIS — Z15.01 BRCA1 GENE MUTATION POSITIVE: ICD-10-CM

## 2019-07-19 DIAGNOSIS — Z15.09 BRCA1 GENE MUTATION POSITIVE: ICD-10-CM

## 2019-07-19 DIAGNOSIS — Z01.818 PREOP GENERAL PHYSICAL EXAM: Primary | ICD-10-CM

## 2019-07-19 PROCEDURE — 99214 OFFICE O/P EST MOD 30 MIN: CPT | Performed by: PHYSICIAN ASSISTANT

## 2019-07-19 ASSESSMENT — MIFFLIN-ST. JEOR: SCORE: 1185.28

## 2019-07-19 NOTE — PROGRESS NOTES
[facility]  [address] [place] [id number]  [phone]  Dept: [phone]    PRE-OP EVALUATION:  Today's date: 2019    Celeste Arguello (: 1985) presents for pre-operative evaluation assessment as requested by Dr. Emily Gee.  She requires evaluation and anesthesia risk assessment prior to undergoing surgery/procedure for treatment of Breast Reconstruction.    Fax number for surgical facility: [phone]  Primary Physician: Carolyn San  Type of Anesthesia Anticipated: to be determined    Patient has a Health Care Directive or Living Will:  NO    Preop Questions 2019   Who is doing your surgery? emily gee   What are you having done? exchange surgery/implants   Date of Surgery/Procedure: 2019   Facility or Hospital where procedure/surgery will be performed: [facility]   1.  Do you have a history of Heart attack, stroke, stent, coronary bypass surgery, or other heart surgery? No   2.  Do you ever have any pain or discomfort in your chest? No   3.  Do you have a history of  Heart Failure? No   4.   Are you troubled by shortness of breath when:  walking on a level surface, or up a slight hill, or at night? No   5.  Do you currently have a cold, bronchitis or other respiratory infection? No   6.  Do you have a cough, shortness of breath, or wheezing? No   7.  Do you sometimes get pains in the calves of your legs when you walk? No   8. Do you or anyone in your family have previous history of blood clots? No   9.  Do you or does anyone in your family have a serious bleeding problem such as prolonged bleeding following surgeries or cuts? No   10. Have you ever had problems with anemia or been told to take iron pills? No   11. Have you had any abnormal blood loss such as black, tarry or bloody stools, or abnormal vaginal bleeding? No   12. Have you ever had a blood transfusion? No   13. Have you or any of your  relatives ever had problems with anesthesia? No   14. Do you have sleep apnea, excessive snoring or daytime drowsiness? No   15. Do you have any prosthetic heart valves? No   16. Do you have prosthetic joints? No   17. Is there any chance that you may be pregnant? No         HPI:     HPI related to upcoming procedure: s/p bilateral mastectomy 2019      See problem list for active medical problems.  Problems all longstanding and stable, except as noted/documented.  See ROS for pertinent symptoms related to these conditions.      MEDICAL HISTORY:     Patient Active Problem List    Diagnosis Date Noted     BRCA gene mutation positive 2019     Priority: Medium     Acquired absence of both breasts and nipples 2019     Priority: Medium     BRCA1 gene mutation positive 06/15/2018     Priority: Medium     BRCA1 c.181T>G (p.C61G)  tested at [facility] 2018       Endometriosis 2015     Priority: Medium     Encounter for routine gynecological examination 2014     Priority: Medium     : Pap-> NIL  2009: PAP-> NIL  Problem list name updated by automated process. Provider to review       Vitamin d deficiency 23 2012     Priority: Medium     Vitamin D3 5000 iu supplement until able to get daily 15' sun to skin this summer. Consider level recheck.       Female genital symptoms 2012     Priority: Medium     2014: Restart SSRI  12: Controlled on Celexa.  Problem list name updated by automated process. Provider to review       Mother  of ovarian cancer, age 47 2012     Priority: Medium     16: Referred to GYN/ONC for genetic counseling/risk eval  MA + gene for ovarian and breast cancer --> elective surgery       Pelvic pain in female 2012     Priority: Medium      Past Medical History:   Diagnosis Date     BRCA1 gene mutation positive 6/15/2018    BRCA1 c.181T>G (p.C61G) tested at [facility] 2018     Vertigo, benign paroxysmal, bilateral      Past Surgical  History:   Procedure Laterality Date     CYSTOSCOPY N/A 9/12/2018    Procedure: CYSTOSCOPY;;  Surgeon: Marybeth Grande MD;  Location: [facility] OR     ENT SURGERY      tonsillectomy     HYSTERECTOMY TOTAL ABDOMINAL, BILATERAL SALPINGO-OOPHORECTOMY, NODE DISSECTION, COMBINED Bilateral 9/12/2018    Procedure: COMBINED HYSTERECTOMY TOTAL ABDOMINAL, SALPINGO-OOPHORECTOMY, NODE DISSECTION;;  Surgeon: Marybeth Grande MD;  Location: [facility] OR     INSERT TISSUE EXPANDER BREAST BILATERAL Bilateral 4/8/2019    Procedure: BILATERAL TISSUE EXPANDERS (GILDARDO);  Surgeon: Emily Gee MD;  Location: [facility] OR     LAPAROSCOPIC HYSTERECTOMY TOTAL, BILATERAL SALPINGO-OOPHORECTOMY, NODE DISSECTION, COMBINED N/A 9/12/2018    Procedure: COMBINED LAPAROSCOPIC HYSTERECTOMY TOTAL, SALPINGO-OOPHORECTOMY, NODE DISSECTION;  Laparoscopic Removal Of Uterus, Cervix, Both Ovaries And Fallopian Tubes, Cystoscopy ;  Surgeon: Marybeth Grande MD;  Location: [facility] OR     LAPAROSCOPY DIAGNOSTIC (GYN)  2006    endometriosis     MASTECTOMY SIMPLE BILATERAL Bilateral 4/8/2019    Procedure: BILATERAL PROPHYLACTIC MASTECTOMY (JUAN);  Surgeon: Roseann Boyd MD;  Location: [facility] OR     urethral reimplant  age 6     Current Outpatient Medications   Medication Sig Dispense Refill     citalopram (CELEXA) 20 MG tablet Take 1 tablet (20 mg) by mouth daily 30 tablet 0     estrogen conj (PREMARIN) 0.3 MG tablet Take 1 tablet (0.3 mg) by mouth daily 90 tablet 3     hydrOXYzine (ATARAX) 25 MG tablet Take 1 tablet (25 mg) by mouth every 6 hours as needed for other (adjuvant pain) 40 tablet 1     meclizine (ANTIVERT) 25 MG tablet Take 1 tablet (25 mg) by mouth 3 times daily as needed for dizziness 60 tablet 1     ondansetron (ZOFRAN-ODT) 4 MG ODT tab Take 1 tablet (4 mg) by mouth every 8 hours as needed for nausea 18 tablet 1     HYDROcodone-acetaminophen (NORCO) 5-325 MG tablet Take 1-2 tablets by mouth every 4 hours as needed for severe  "pain (Patient not taking: Reported on 2019) 20 tablet 0     OTC products: no recent use of OTC ASA, NSAIDS or Steroids    Allergies   Allergen Reactions     Chloraprep One Step Rash      Latex Allergy: NO    Social History     Tobacco Use     Smoking status: Former Smoker     Packs/day: 0.50     Years: 10.00     Pack years: 5.00     Types: Cigarettes     Start date: 2005     Last attempt to quit: 2018     Years since quittin.9     Smokeless tobacco: Never Used   Substance Use Topics     Alcohol use: No     Comment: 1 per year      History   Drug Use No       REVIEW OF SYSTEMS:   CONSTITUTIONAL: NEGATIVE for fever, chills, change in weight  INTEGUMENTARY/SKIN: NEGATIVE for worrisome rashes, moles or lesions  EYES: NEGATIVE for vision changes or irritation  ENT/MOUTH: NEGATIVE for ear, mouth and throat problems  RESP: NEGATIVE for significant cough or SOB  BREAST: NEGATIVE for masses, tenderness or discharge  CV: NEGATIVE for chest pain, palpitations or peripheral edema  GI: NEGATIVE for nausea, abdominal pain, heartburn, or change in bowel habits  : NEGATIVE for frequency, dysuria, or hematuria  MUSCULOSKELETAL: NEGATIVE for significant arthralgias or myalgia  NEURO: NEGATIVE for weakness, dizziness or paresthesias  ENDOCRINE: NEGATIVE for temperature intolerance, skin/hair changes  HEME: NEGATIVE for bleeding problems  PSYCHIATRIC: NEGATIVE for changes in mood or affect    EXAM:   BP 90/60   Pulse 70   Temp 97.5  F (36.4  C) (Oral)   Resp 16   Ht 1.566 m (5' 1.65\")   Wt 53.3 kg (117 lb 6.4 oz)   LMP 2016   SpO2 97%   BMI 21.71 kg/m      GENERAL APPEARANCE: healthy, alert and no distress     EYES: EOMI, PERRL     HENT: ear canals and TM's normal and nose and mouth without ulcers or lesions     NECK: no adenopathy, no asymmetry, masses, or scars and thyroid normal to palpation     RESP: lungs clear to auscultation - no rales, rhonchi or wheezes     CV: regular rates and rhythm, normal " S1 S2, no S3 or S4 and no murmur, click or rub     ABDOMEN:  soft, nontender, no HSM or masses and bowel sounds normal     MS: extremities normal- no gross deformities noted, no evidence of inflammation in joints, FROM in all extremities.     SKIN: no suspicious lesions or rashes     NEURO: Normal strength and tone, sensory exam grossly normal, mentation intact and speech normal     PSYCH: mentation appears normal. and affect normal/bright     LYMPHATICS: No cervical adenopathy    DIAGNOSTICS:   EKG: Not indicated due to non-vascular surgery and low risk of event (age <65 and without cardiac risk factors)    Recent Labs   Lab Test 03/25/19  1443 08/24/18  1558   HGB 13.6 14.0   * 182   NA  --  143   POTASSIUM  --  3.5   CR  --  0.78        IMPRESSION:   The proposed surgical procedure is considered LOW risk.    REVISED CARDIAC RISK INDEX  The patient has the following serious cardiovascular risks for perioperative complications such as (MI, PE, VFib and 3  AV Block):  No serious cardiac risks  INTERPRETATION: 1 risks: Class II (low risk - 0.9% complication rate)    The patient has the following additional risks for perioperative complications:  No identified additional risks      ICD-10-CM    1. Preop general physical exam Z01.818    2. BRCA1 gene mutation positive Z15.01     Z15.09        RECOMMENDATIONS:     APPROVAL GIVEN to proceed with proposed procedure, without further diagnostic evaluation       Signed Electronically by: Miriam Lnadin PA-C    Copy of this evaluation report is provided to requesting physician.    [facility] Preop Guidelines    Revised Cardiac Risk Index

## 2019-07-19 NOTE — TELEPHONE ENCOUNTER
Received refill request for citalopram.  Last in clinic 4/2018.  Pt called 7/3/19 by BETHEL RN and given temporary supply and message left that she needed annual exam - nothing is scheduled.    Forwarding to Delores Page to review.

## 2019-07-21 RX ORDER — CITALOPRAM HYDROBROMIDE 20 MG/1
20 TABLET ORAL DAILY
Qty: 30 TABLET | Refills: 0 | Status: SHIPPED | OUTPATIENT
Start: 2019-07-21 | End: 2019-12-09

## 2019-10-30 DIAGNOSIS — E89.40 SURGICAL MENOPAUSE: ICD-10-CM

## 2019-10-30 NOTE — TELEPHONE ENCOUNTER
"Routing refill request to provider for review/approval because:  Last prescribed 11/30/18 by Feliciano Perez CNP.   Patient no-showed today's appointment.    Requested Prescriptions   Pending Prescriptions Disp Refills     estrogen conj (PREMARIN) 0.3 MG tablet 90 tablet 3     Sig: Take 1 tablet (0.3 mg) by mouth daily       Hormone Replacement Therapy Passed - 10/30/2019 10:54 AM        Passed - Blood pressure under 140/90 in past 12 months     BP Readings from Last 3 Encounters:   07/19/19 90/60   04/09/19 98/58   03/25/19 116/76                 Passed - Recent (12 mo) or future (30 days) visit within the authorizing provider's specialty     Patient has had an office visit with the authorizing provider or a provider within the authorizing providers department within the previous 12 mos or has a future within next 30 days. See \"Patient Info\" tab in inbasket, or \"Choose Columns\" in Meds & Orders section of the refill encounter.              Passed - Medication is active on med list        Passed - Patient is 18 years of age or older        Passed - No active pregnancy on record        Passed - No positive pregnancy test on record in past 12 months        Mariza Ramos RN  "

## 2019-10-30 NOTE — TELEPHONE ENCOUNTER
Received call from Celeste stating she is out of premarin and is wondering if she can get a short-term refill and then make an appointment to come to clinic.  Review of Epic shows she was here in 11/2018.    Nurse agreed to 30 day supply. Celsete agreed with plan and had no further questions.

## 2019-10-30 NOTE — TELEPHONE ENCOUNTER
Please advise patient Carolyn cannot prescribe this and to contact her OB/GYN.     Mariza Ramos RN

## 2019-10-30 NOTE — TELEPHONE ENCOUNTER
Reason for call:  Medication     If this is a refill request, has the caller requested the refill from the pharmacy already? Yes     Will the patient be using a [facility]? Yes     Name of the pharmacy and phone number for the current request: [facility]    Name of the medication requested: estrogen conj (PREMARIN) 0.3 MG tablet    Other request: Call patient when sent    Phone number to reach patient:  Home number on file [phone] (home)    Best Time:  any    Can we leave a detailed message on this number?  YES

## 2019-11-07 ENCOUNTER — HEALTH MAINTENANCE LETTER (OUTPATIENT)
Age: 34
End: 2019-11-07

## 2019-12-09 ENCOUNTER — OFFICE VISIT (OUTPATIENT)
Dept: INTERNAL MEDICINE | Facility: CLINIC | Age: 34
End: 2019-12-09
Attending: INTERNAL MEDICINE
Payer: COMMERCIAL

## 2019-12-09 VITALS
BODY MASS INDEX: 22.45 KG/M2 | SYSTOLIC BLOOD PRESSURE: 105 MMHG | DIASTOLIC BLOOD PRESSURE: 72 MMHG | HEIGHT: 62 IN | WEIGHT: 122 LBS | HEART RATE: 77 BPM

## 2019-12-09 DIAGNOSIS — F43.22 ADJUSTMENT DISORDER WITH ANXIOUS MOOD: ICD-10-CM

## 2019-12-09 DIAGNOSIS — F33.2 SEVERE EPISODE OF RECURRENT MAJOR DEPRESSIVE DISORDER, WITHOUT PSYCHOTIC FEATURES (H): Primary | ICD-10-CM

## 2019-12-09 LAB
ALBUMIN SERPL-MCNC: 3.9 G/DL (ref 3.4–5)
ALP SERPL-CCNC: 58 U/L (ref 40–150)
ALT SERPL W P-5'-P-CCNC: 14 U/L (ref 0–50)
ANION GAP SERPL CALCULATED.3IONS-SCNC: 3 MMOL/L (ref 3–14)
AST SERPL W P-5'-P-CCNC: 15 U/L (ref 0–45)
BILIRUB SERPL-MCNC: 0.6 MG/DL (ref 0.2–1.3)
BUN SERPL-MCNC: 11 MG/DL (ref 7–30)
CALCIUM SERPL-MCNC: 8.8 MG/DL (ref 8.5–10.1)
CHLORIDE SERPL-SCNC: 106 MMOL/L (ref 94–109)
CO2 SERPL-SCNC: 30 MMOL/L (ref 20–32)
CREAT SERPL-MCNC: 0.76 MG/DL (ref 0.52–1.04)
GFR SERPL CREATININE-BSD FRML MDRD: >90 ML/MIN/{1.73_M2}
GLUCOSE SERPL-MCNC: 87 MG/DL (ref 70–99)
POTASSIUM SERPL-SCNC: 4.1 MMOL/L (ref 3.4–5.3)
PROT SERPL-MCNC: 7.8 G/DL (ref 6.8–8.8)
SODIUM SERPL-SCNC: 139 MMOL/L (ref 133–144)
TSH SERPL DL<=0.005 MIU/L-ACNC: 1.2 MU/L (ref 0.4–4)

## 2019-12-09 PROCEDURE — 80053 COMPREHEN METABOLIC PANEL: CPT | Performed by: INTERNAL MEDICINE

## 2019-12-09 PROCEDURE — 36415 COLL VENOUS BLD VENIPUNCTURE: CPT | Performed by: INTERNAL MEDICINE

## 2019-12-09 PROCEDURE — 84443 ASSAY THYROID STIM HORMONE: CPT | Performed by: INTERNAL MEDICINE

## 2019-12-09 RX ORDER — HYDROXYZINE HYDROCHLORIDE 10 MG/1
10 TABLET, FILM COATED ORAL 3 TIMES DAILY PRN
Qty: 90 TABLET | Refills: 3 | Status: SHIPPED | OUTPATIENT
Start: 2019-12-09 | End: 2022-06-24

## 2019-12-09 RX ORDER — CITALOPRAM HYDROBROMIDE 40 MG/1
40 TABLET ORAL DAILY
Qty: 30 TABLET | Refills: 3 | Status: SHIPPED | OUTPATIENT
Start: 2019-12-09 | End: 2020-04-06

## 2019-12-09 ASSESSMENT — ENCOUNTER SYMPTOMS
BACK PAIN: 0
DIARRHEA: 0
EYE WATERING: 0
WEAKNESS: 0
FEVER: 0
DYSPNEA ON EXERTION: 0
LEG SWELLING: 0
NAUSEA: 0
BRUISES/BLEEDS EASILY: 0
CONSTIPATION: 0
PANIC: 1
INSOMNIA: 0
DIZZINESS: 0
COUGH: 0
POLYDIPSIA: 0
POLYPHAGIA: 0
DECREASED CONCENTRATION: 1
SWOLLEN GLANDS: 0
ABDOMINAL PAIN: 0
CHILLS: 0
FATIGUE: 1
DEPRESSION: 1
NERVOUS/ANXIOUS: 1
ALTERED TEMPERATURE REGULATION: 0
SINUS CONGESTION: 0

## 2019-12-09 ASSESSMENT — ANXIETY QUESTIONNAIRES
6. BECOMING EASILY ANNOYED OR IRRITABLE: NEARLY EVERY DAY
1. FEELING NERVOUS, ANXIOUS, OR ON EDGE: NEARLY EVERY DAY
3. WORRYING TOO MUCH ABOUT DIFFERENT THINGS: NEARLY EVERY DAY
7. FEELING AFRAID AS IF SOMETHING AWFUL MIGHT HAPPEN: NEARLY EVERY DAY
2. NOT BEING ABLE TO STOP OR CONTROL WORRYING: NEARLY EVERY DAY
5. BEING SO RESTLESS THAT IT IS HARD TO SIT STILL: MORE THAN HALF THE DAYS
GAD7 TOTAL SCORE: 19

## 2019-12-09 ASSESSMENT — PATIENT HEALTH QUESTIONNAIRE - PHQ9
5. POOR APPETITE OR OVEREATING: MORE THAN HALF THE DAYS
SUM OF ALL RESPONSES TO PHQ QUESTIONS 1-9: 20

## 2019-12-09 ASSESSMENT — MIFFLIN-ST. JEOR: SCORE: 1211.64

## 2019-12-09 ASSESSMENT — PAIN SCALES - GENERAL: PAINLEVEL: NO PAIN (0)

## 2019-12-09 NOTE — LETTER
12/9/2019       RE: Celeste Arguello  [address]  [address]  [facility] [address]     Dear Colleague,    Thank you for referring your patient, Celeste Arguello, to the [facility]  at [facility]. Please see a copy of my visit note below.    HPI  Patient is here for evaluation of anxiety and depression. She works as a mental health therapist. She also has a therapist that she is planning to see later this week. Patient reports that she has not felt like this in the past. She reports significant issues with mood, emotional outbursts and some suicidal ideation (no plan).    Review of Systems     Constitutional:  Positive for fatigue. Negative for fever and chills.   HENT:  Negative for dry mouth and sinus congestion.    Eyes:  Negative for eye watering.   Respiratory:   Negative for cough and dyspnea on exertion.    Cardiovascular:  Negative for chest pain, dyspnea on exertion, leg swelling and edema.   Gastrointestinal:  Negative for nausea, abdominal pain, diarrhea and constipation.   Musculoskeletal:  Negative for back pain.   Skin:  Negative for itching.   Neurological:  Negative for dizziness and weakness.   Endo/Heme:  Negative for anemia, swollen glands and bruises/bleeds easily.   Psychiatric/Behavioral:  Positive for depression, decreased concentration, mood swings and panic attacks.    Endocrine:  Negative for altered temperature regulation, polyphagia, polydipsia, unwanted hair growth and change in facial hair.    Current Outpatient Medications   Medication     citalopram (CELEXA) 20 MG tablet     estrogen conj (PREMARIN) 0.3 MG tablet     No current facility-administered medications for this visit.      Past Medical History:   Diagnosis Date     BRCA1 gene mutation positive 6/15/2018    BRCA1 c.181T>G (p.C61G) tested at [facility] 5/9/2018     Vertigo, benign paroxysmal, bilateral      Past Surgical History:   Procedure Laterality Date      CYSTOSCOPY N/A 2018    Procedure: CYSTOSCOPY;;  Surgeon: Marybeth Grande MD;  Location: [facility] OR     ENT SURGERY      tonsillectomy     HYSTERECTOMY TOTAL ABDOMINAL, BILATERAL SALPINGO-OOPHORECTOMY, NODE DISSECTION, COMBINED Bilateral 2018    Procedure: COMBINED HYSTERECTOMY TOTAL ABDOMINAL, SALPINGO-OOPHORECTOMY, NODE DISSECTION;;  Surgeon: Marybeth Grande MD;  Location[facility] OR     INSERT TISSUE EXPANDER BREAST BILATERAL Bilateral 2019    Procedure: BILATERAL TISSUE EXPANDERS (GILDARDO);  Surgeon: Emily Gee MD;  Location[facility]     LAPAROSCOPIC HYSTERECTOMY TOTAL, BILATERAL SALPINGO-OOPHORECTOMY, NODE DISSECTION, COMBINED N/A 2018    Procedure: COMBINED LAPAROSCOPIC HYSTERECTOMY TOTAL, SALPINGO-OOPHORECTOMY, NODE DISSECTION;  Laparoscopic Removal Of Uterus, Cervix, Both Ovaries And Fallopian Tubes, Cystoscopy ;  Surgeon: Marybeth Grande MD;  Location[facility]     LAPAROSCOPY DIAGNOSTIC (GYN)      endometriosis     MASTECTOMY SIMPLE BILATERAL Bilateral 2019    Procedure: BILATERAL PROPHYLACTIC MASTECTOMY (JUAN);  Surgeon: Roseann Boyd MD;  Location: [facility] OR     urethral reimplant  age 6     Family History   Problem Relation Age of Onset     Ovarian Cancer Mother 38         age 47     Hereditary Breast and Ovarian Cancer Syndrome Maternal Aunt 50        Breast cancer gene -- MEHRDAD/BSO and mastectomy     Pancreatic Cancer Maternal Uncle 38     Alcohol/Drug Brother         active     Alcohol/Drug Brother         recovering     Psychotic Disorder Brother         depression and ADHD     Breast Cancer Maternal Aunt 35        & MC age 36     Uterine Cancer Maternal Aunt      C.A.D. 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   Social Needs     Financial resource strain: Not on file     Food insecurity:     Worry: Not on file     Inability: Not on file     Transportation needs:     Medical: Not on file     Non-medical: Not on file   Tobacco Use     Smoking status: Former Smoker     Packs/day: 0.50     Years: 10.00     Pack years: 5.00     Types: Cigarettes     Start date: 2005     Last attempt to quit: 2018     Years since quittin.3     Smokeless tobacco: Never Used   Substance and Sexual Activity     Alcohol use: No     Comment: 1 per year      Drug use: No     Sexual activity: Yes     Partners: Male     Birth control/protection: I.U.D., Pill, OCP     Comment: Mirena; OCP for cycle control   Lifestyle     Physical activity:     Days per week: Not on file     Minutes per session: Not on file     Stress: Not on file   Relationships     Social connections:     Talks on phone: Not on file     Gets together: Not on file     Attends [employer] service: Not on file     Active member of club or organization: Not on file     Attends meetings of clubs or organizations: Not on file     Relationship status: Not on file     Intimate partner violence:     Fear of current or ex partner: Not on file     Emotionally abused: Not on file     Physically abused: Not on file     Forced sexual activity: Not on file   Other Topics Concern     Parent/sibling w/ CABG, MI or angioplasty before 65F 55M? Not Asked   Social History Narrative    How much exercise per week? One    How much calcium per day? Dairy products       How much caffeine per day? 1 cup coffee and 2 can of coke    How much vitamin D per day? None     "Do you/your family wear seatbelts?  Yes    Do you/your family use safety helmets? yes    Do you/your family use sunscreen? Yes    Do you/your family keep firearms in the home? No    Do you/your family have a smoke detector(s)? Yes        Do you feel safe in your home? Yes    Has anyone ever touched you in an unwanted manner? No     Explain     SEE GLADYS [facility]     06/16/2014    Beronica Dumas [facility] 04/26/18           Vitals:    12/09/19 0916 12/09/19 0917 12/09/19 0918 12/09/19 0919   BP: 124/84 103/71 106/73 105/72   Pulse: 77 77 77 77   Weight: 55.3 kg (122 lb)      Height: 1.575 m (5' 2\")          Physical Exam  Vitals signs and nursing note reviewed.   Constitutional:       Appearance: Normal appearance.   HENT:      Head: Normocephalic and atraumatic.      Nose: Nose normal.      Mouth/Throat:      Mouth: Mucous membranes are moist.      Pharynx: Oropharynx is clear.   Eyes:      Extraocular Movements: Extraocular movements intact.      Conjunctiva/sclera: Conjunctivae normal.      Pupils: Pupils are equal, round, and reactive to light.   Cardiovascular:      Rate and Rhythm: Normal rate and regular rhythm.      Pulses: Normal pulses.   Pulmonary:      Effort: Pulmonary effort is normal.      Breath sounds: Normal breath sounds.   Abdominal:      General: Abdomen is flat.      Palpations: Abdomen is soft.   Musculoskeletal:         General: No edema.   Neurological:      General: No focal deficit present.      Mental Status: She is oriented to person, place, and time.   Psychiatric:         Mood and Affect: Mood normal.         Behavior: Behavior normal.         Thought Content: Thought content normal.         Judgment: Judgment normal.       Assessment and Plan:  Celeste was seen today for establish care.    Diagnoses and all orders for this visit:    Severe episode of recurrent major depressive disorder, without psychotic features (H). Discussed management of depression with patient. Recommend establishing " care with Psychiatry given prior self-harm attempts and severity of depression and anxiety. Will increase the dose of citalopram to 40 mg. Patient was also advised on medications for acute anxiety, will try hydroxyzine at low dose. Patient was advised to follow up in 2-3 weeks. Discussed behavioral emergency center evaluation and crisis management. SHe is in agreement with the plan.   -     MENTAL HEALTH REFERRAL  - Adult; Psychiatry and Medication Management; Psychiatry; [facility]: Psychiatry Clinic [phone]; We will contact you to schedule the appointment or please call with any questions  -     hydrOXYzine (ATARAX) 10 MG tablet; Take 1 tablet (10 mg) by mouth 3 times daily as needed for itching  -     Comprehensive metabolic panel  -     TSH with free T4 reflex    Adjustment disorder with anxious mood  -     citalopram (CELEXA) 40 MG tablet; Take 1 tablet (40 mg) by mouth daily    Total time spent 45  minutes.  More than 50% of the time spent with Ms. Arguello on counseling / coordinating her care    Nedra Coburn MD

## 2019-12-09 NOTE — PROGRESS NOTES
HPI  Patient is here for evaluation of anxiety and depression. She works as a mental health therapist. She also has a therapist that she is planning to see later this week. Patient reports that she has not felt like this in the past. She reports significant issues with mood, emotional outbursts and some suicidal ideation (no plan).    Review of Systems     Constitutional:  Positive for fatigue. Negative for fever and chills.   HENT:  Negative for dry mouth and sinus congestion.    Eyes:  Negative for eye watering.   Respiratory:   Negative for cough and dyspnea on exertion.    Cardiovascular:  Negative for chest pain, dyspnea on exertion, leg swelling and edema.   Gastrointestinal:  Negative for nausea, abdominal pain, diarrhea and constipation.   Musculoskeletal:  Negative for back pain.   Skin:  Negative for itching.   Neurological:  Negative for dizziness and weakness.   Endo/Heme:  Negative for anemia, swollen glands and bruises/bleeds easily.   Psychiatric/Behavioral:  Positive for depression, decreased concentration, mood swings and panic attacks.    Endocrine:  Negative for altered temperature regulation, polyphagia, polydipsia, unwanted hair growth and change in facial hair.    Current Outpatient Medications   Medication     citalopram (CELEXA) 20 MG tablet     estrogen conj (PREMARIN) 0.3 MG tablet     No current facility-administered medications for this visit.      Past Medical History:   Diagnosis Date     BRCA1 gene mutation positive 6/15/2018    BRCA1 c.181T>G (p.C61G) tested at [facility] 5/9/2018     Vertigo, benign paroxysmal, bilateral      Past Surgical History:   Procedure Laterality Date     CYSTOSCOPY N/A 9/12/2018    Procedure: CYSTOSCOPY;;  Surgeon: Marybeth Grande MD;  [facility] OR     ENT SURGERY      tonsillectomy     HYSTERECTOMY TOTAL ABDOMINAL, BILATERAL SALPINGO-OOPHORECTOMY, NODE DISSECTION, COMBINED Bilateral 9/12/2018    Procedure: COMBINED HYSTERECTOMY TOTAL ABDOMINAL,  SALPINGO-OOPHORECTOMY, NODE DISSECTION;;  Surgeon: Marybeth Grande MD;  [facility] OR     INSERT TISSUE EXPANDER BREAST BILATERAL Bilateral 2019    Procedure: BILATERAL TISSUE EXPANDERS (GILDARDO);  Surgeon: Emily Gee MD;  [facility]     LAPAROSCOPIC HYSTERECTOMY TOTAL, BILATERAL SALPINGO-OOPHORECTOMY, NODE DISSECTION, COMBINED N/A 2018    Procedure: COMBINED LAPAROSCOPIC HYSTERECTOMY TOTAL, SALPINGO-OOPHORECTOMY, NODE DISSECTION;  Laparoscopic Removal Of Uterus, Cervix, Both Ovaries And Fallopian Tubes, Cystoscopy ;  Surgeon: Marybeth Grande MD;  Location: [facility] OR     LAPAROSCOPY DIAGNOSTIC (GYN)      endometriosis     MASTECTOMY SIMPLE BILATERAL Bilateral 2019    Procedure: BILATERAL PROPHYLACTIC MASTECTOMY (JUAN);  Surgeon: Roseann Boyd MD;  Location: [facility] OR     urethral reimplant  age 6     Family History   Problem Relation Age of Onset     Ovarian Cancer Mother 38         age 47     Hereditary Breast and Ovarian Cancer Syndrome Maternal Aunt 50        Breast cancer gene -- MEHRDAD/BSO and mastectomy     Pancreatic Cancer Maternal Uncle 38     Alcohol/Drug Brother         active     Alcohol/Drug Brother         recovering     Psychotic Disorder Brother         depression and ADHD     Breast Cancer Maternal Aunt 35        & MC age 36     Uterine Cancer Maternal Aunt      C.A.D. 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   Social Needs     Financial resource strain: Not on file     Food insecurity:     Worry: Not on file     Inability: Not on file     Transportation needs:     Medical: Not on file     Non-medical: Not on file   Tobacco Use     Smoking status: Former Smoker     Packs/day: 0.50     Years: 10.00     Pack years: 5.00     Types: Cigarettes     Start date: 2005     Last attempt to quit: 2018     Years since quittin.3     Smokeless tobacco: Never Used   Substance and Sexual Activity     Alcohol use: No     Comment: 1 per year      Drug use: No     Sexual activity: Yes     Partners: Male     Birth control/protection: I.U.D., Pill, OCP     Comment: Mirena; OCP for cycle control   Lifestyle     Physical activity:     Days per week: Not on file     Minutes per session: Not on file     Stress: Not on file   Relationships     Social connections:     Talks on phone: Not on file     Gets together: Not on file     Attends Yarsani service: Not on file     Active member of club or organization: Not on file     Attends meetings of clubs or organizations: Not on file     Relationship status: Not on file     Intimate partner violence:     Fear of current or ex partner: Not on file     Emotionally abused: Not on file     Physically abused: Not on file     Forced sexual activity: Not on file   Other Topics Concern     Parent/sibling w/ CABG, MI or angioplasty before 65F 55M? Not Asked   Social History Narrative    How much exercise per week? One    How much calcium per day? Dairy products       How much caffeine per day? 1 cup coffee and 2 can of coke    How much vitamin D per day? None    Do you/your family wear seatbelts?  Yes    Do you/your family use safety helmets? yes    Do you/your family use sunscreen? Yes    Do you/your family keep firearms in the home? No    Do you/your family have a smoke detector(s)? Yes        Do you feel safe in your home? Yes    Has anyone ever touched you  "in an unwanted manner? No     Explain     SEE GLADYS [facility]     06/16/2014    Beronica Dumas [facility] 04/26/18           Vitals:    12/09/19 0916 12/09/19 0917 12/09/19 0918 12/09/19 0919   BP: 124/84 103/71 106/73 105/72   Pulse: 77 77 77 77   Weight: 55.3 kg (122 lb)      Height: 1.575 m (5' 2\")          Physical Exam  Vitals signs and nursing note reviewed.   Constitutional:       Appearance: Normal appearance.   HENT:      Head: Normocephalic and atraumatic.      Nose: Nose normal.      Mouth/Throat:      Mouth: Mucous membranes are moist.      Pharynx: Oropharynx is clear.   Eyes:      Extraocular Movements: Extraocular movements intact.      Conjunctiva/sclera: Conjunctivae normal.      Pupils: Pupils are equal, round, and reactive to light.   Cardiovascular:      Rate and Rhythm: Normal rate and regular rhythm.      Pulses: Normal pulses.   Pulmonary:      Effort: Pulmonary effort is normal.      Breath sounds: Normal breath sounds.   Abdominal:      General: Abdomen is flat.      Palpations: Abdomen is soft.   Musculoskeletal:         General: No edema.   Neurological:      General: No focal deficit present.      Mental Status: She is oriented to person, place, and time.   Psychiatric:         Mood and Affect: Mood normal.         Behavior: Behavior normal.         Thought Content: Thought content normal.         Judgment: Judgment normal.       Assessment and Plan:  Celeste was seen today for establish care.    Diagnoses and all orders for this visit:    Severe episode of recurrent major depressive disorder, without psychotic features (H). Discussed management of depression with patient. Recommend establishing care with Psychiatry given prior self-harm attempts and severity of depression and anxiety. Will increase the dose of citalopram to 40 mg. Patient was also advised on medications for acute anxiety, will try hydroxyzine at low dose. Patient was advised to follow up in 2-3 weeks. Discussed behavioral " emergency center evaluation and crisis management. SHe is in agreement with the plan.   -     MENTAL HEALTH REFERRAL  - Adult; Psychiatry and Medication Management; Psychiatry; [facility]: Psychiatry Clinic [phone]; We will contact you to schedule the appointment or please call with any questions  -     hydrOXYzine (ATARAX) 10 MG tablet; Take 1 tablet (10 mg) by mouth 3 times daily as needed for itching  -     Comprehensive metabolic panel  -     TSH with free T4 reflex    Adjustment disorder with anxious mood  -     citalopram (CELEXA) 40 MG tablet; Take 1 tablet (40 mg) by mouth daily    Total time spent 45  minutes.  More than 50% of the time spent with Ms. Arguello on counseling / coordinating her care    Nedra Coburn MD

## 2019-12-10 ASSESSMENT — ANXIETY QUESTIONNAIRES: GAD7 TOTAL SCORE: 19

## 2020-02-05 DIAGNOSIS — E89.40 SURGICAL MENOPAUSE: ICD-10-CM

## 2020-02-05 RX ORDER — CONJUGATED ESTROGENS 0.3 MG/1
TABLET, FILM COATED ORAL
Qty: 30 TABLET | Refills: 0 | Status: SHIPPED | OUTPATIENT
Start: 2020-02-05 | End: 2020-04-05

## 2020-04-01 DIAGNOSIS — F43.22 ADJUSTMENT DISORDER WITH ANXIOUS MOOD: ICD-10-CM

## 2020-04-05 ENCOUNTER — MYC REFILL (OUTPATIENT)
Dept: OBGYN | Facility: CLINIC | Age: 35
End: 2020-04-05

## 2020-04-05 DIAGNOSIS — E89.40 SURGICAL MENOPAUSE: ICD-10-CM

## 2020-04-06 RX ORDER — CITALOPRAM HYDROBROMIDE 40 MG/1
TABLET ORAL
Qty: 30 TABLET | Refills: 3 | Status: SHIPPED | OUTPATIENT
Start: 2020-04-06 | End: 2022-06-24

## 2020-04-06 NOTE — TELEPHONE ENCOUNTER
Received refill request for citalopram.  Last in clinic 12/2019.  Follow up  In 2-3 weeks after visit.      Left message on Celeste's voice mail to please call nurses at [phone] to discuss.     Will forward RX refill request to MD .     PHQ-9 score:    PHQ 12/9/2019   PHQ-9 Total Score 20   Q9: Thoughts of better off dead/self-harm past 2 weeks Several days

## 2020-04-07 NOTE — TELEPHONE ENCOUNTER
Received refill request for premarin.  Last in clinic  04/2018 annual  12/19 other visit.  Requesting refill of Premarin and understands she needs to reestablish care for ob/gyn.      Will forward to Dr. Coburn

## 2020-04-17 ENCOUNTER — TELEPHONE (OUTPATIENT)
Dept: FAMILY MEDICINE | Facility: CLINIC | Age: 35
End: 2020-04-17

## 2020-04-17 NOTE — LETTER
April 22, 2020        Celeste Arguello  [address]  [address]  [facility] [address]        Dear Celeste,     Your clinic record indicates that you are due for a depression update. We have a survey tool called a PHQ-9 (or Patient Health Quesionnaire) we use to measure the level of control of your depression. Please complete the enclosed questionnaire and mail it back to us in the self-addressed stamped envelope.     If you have questions about this letter please contact your provider.     Sincerely,    Your [facility]

## 2020-04-17 NOTE — TELEPHONE ENCOUNTER
Panel Management Review      Patient has the following on her problem list:     Depression / Dysthymia review    Measure:  Needs PHQ-9 score of 4 or less during index window.  Administer PHQ-9 and if score is 5 or more, send encounter to provider for next steps.        PHQ-9 SCORE 3/16/2017 4/26/2018 12/9/2019   PHQ-9 Total Score - - -   PHQ-9 Total Score 2 1 20       If PHQ-9 recheck is 5 or more, route to provider for next steps.    Patient is due for:  PHQ9      Composite cancer screening  Chart review shows that this patient is due/due soon for the following None  Summary:    Patient is due/failing the following:   PHQ9    Action needed:   Patient needs to do PHQ9.    Type of outreach:    Phone, left message for patient to call back.     Questions for provider review:    None                                                                                                                                    Kayley PINO CMA ([facility])       Chart routed to Care Team .

## 2020-04-21 NOTE — TELEPHONE ENCOUNTER
Carrol not read. Called and left patient VM to return call to complete PHQ9  Saidai KIMBERLY Randhawa on 4/21/2020 at 9:18 AM

## 2020-04-30 ASSESSMENT — PATIENT HEALTH QUESTIONNAIRE - PHQ9: SUM OF ALL RESPONSES TO PHQ QUESTIONS 1-9: 1

## 2020-04-30 NOTE — TELEPHONE ENCOUNTER
PHQ-9 received and entered:     PHQ 4/30/2020   PHQ-9 Total Score 1   Q9: Thoughts of better off dead/self-harm past 2 weeks Not at all     Anel Li MA

## 2020-12-06 ENCOUNTER — HEALTH MAINTENANCE LETTER (OUTPATIENT)
Age: 35
End: 2020-12-06

## 2021-09-25 ENCOUNTER — HEALTH MAINTENANCE LETTER (OUTPATIENT)
Age: 36
End: 2021-09-25

## 2022-01-15 ENCOUNTER — HEALTH MAINTENANCE LETTER (OUTPATIENT)
Age: 37
End: 2022-01-15

## 2022-06-01 ENCOUNTER — TRANSCRIBE ORDERS (OUTPATIENT)
Dept: OTHER | Age: 37
End: 2022-06-01
Payer: COMMERCIAL

## 2022-06-01 DIAGNOSIS — Z15.01 BRCA1 POSITIVE: Primary | ICD-10-CM

## 2022-06-01 DIAGNOSIS — Z15.09 BRCA1 POSITIVE: Primary | ICD-10-CM

## 2022-06-24 ENCOUNTER — OFFICE VISIT (OUTPATIENT)
Dept: SURGERY | Facility: CLINIC | Age: 37
End: 2022-06-24
Payer: COMMERCIAL

## 2022-06-24 VITALS
HEIGHT: 61 IN | DIASTOLIC BLOOD PRESSURE: 60 MMHG | WEIGHT: 118 LBS | HEART RATE: 69 BPM | SYSTOLIC BLOOD PRESSURE: 90 MMHG | BODY MASS INDEX: 22.28 KG/M2

## 2022-06-24 DIAGNOSIS — R59.0 AXILLARY ADENOPATHY: Primary | ICD-10-CM

## 2022-06-24 PROCEDURE — 99202 OFFICE O/P NEW SF 15 MIN: CPT | Performed by: SURGERY

## 2022-06-24 NOTE — PROGRESS NOTES
[facility] Follow Up Note    CHIEF COMPLAINT:  H/o BRCA and s/p bilateral mastectomies    HISTORY OF PRESENT ILLNESS:  Celeste Arguello is a 37 year old female who is seen in follow up for chest concern.    She is s/p bilateral prophylactic risk reducing mastectomies with immediate reconstruction on 4/8/2019. Dr. Gee performed her reconstruction.     She reports she has noticed initially a month or so ago, a tender lymph node in her left axilla. This one resolved and then she noticed a tender node in her right axilla and she was concerned. The node in the right axilla is no longer tender. She did have COVID about 3 months ago.     REVIEW OF SYSTEMS:  Constitutional:  Negative for chills, fatigue, fever and weight change.  Eyes:  Negative for blurred vision, eye pain and photophobia.  ENT:  Negative for hearing problems, ENT pain, congestion, rhinorrhea, epistaxis, hoarseness and dental problems.  Cardiovascular:  Negative for chest pain, palpitations, tachycardia, orthopnea and edema.  Respiratory:  Negative for cough, dyspnea and hemoptysis.  Gastrointestinal:  Negative for abdominal pain, heartburn, constipation, diarrhea and stool changes.  Musculoskeletal:  Negative for arthralgias, back pain and myalgias.  Integumentary/Breast:  See HPI.    Past Medical History:   Diagnosis Date     BRCA1 gene mutation positive 6/15/2018    BRCA1 c.181T>G (p.C61G) tested at [facility] 5/9/2018     Vertigo, benign paroxysmal, bilateral        Past Surgical History:   Procedure Laterality Date     CYSTOSCOPY N/A 9/12/2018    Procedure: CYSTOSCOPY;;  Surgeon: Marybeth Grande MD;  Location: [facility] OR     ENT SURGERY      tonsillectomy     HYSTERECTOMY TOTAL ABDOMINAL, BILATERAL SALPINGO-OOPHORECTOMY, NODE DISSECTION, COMBINED Bilateral 9/12/2018    Procedure: COMBINED HYSTERECTOMY TOTAL ABDOMINAL, SALPINGO-OOPHORECTOMY, NODE DISSECTION;;  Surgeon: Marybeth Grande MD;  Location: [facility] OR     INSERT  TISSUE EXPANDER BREAST BILATERAL Bilateral 2019    Procedure: BILATERAL TISSUE EXPANDERS (GILDARDO);  Surgeon: Emily Gee MD;  Location: [facility] OR     LAPAROSCOPIC HYSTERECTOMY TOTAL, BILATERAL SALPINGO-OOPHORECTOMY, NODE DISSECTION, COMBINED N/A 2018    Procedure: COMBINED LAPAROSCOPIC HYSTERECTOMY TOTAL, SALPINGO-OOPHORECTOMY, NODE DISSECTION;  Laparoscopic Removal Of Uterus, Cervix, Both Ovaries And Fallopian Tubes, Cystoscopy ;  Surgeon: Marybeth Grande MD;  Location: [facility] OR     LAPAROSCOPY DIAGNOSTIC (GYN)  2006    endometriosis     MASTECTOMY SIMPLE BILATERAL Bilateral 2019    Procedure: BILATERAL PROPHYLACTIC MASTECTOMY (JUAN);  Surgeon: Roseann Boyd MD;  Location: [facility] OR     urethral reimplant  age 6       Family History   Problem Relation Age of Onset     Ovarian Cancer Mother 38         age 47     Hereditary Breast and Ovarian Cancer Syndrome Maternal Aunt 50        Breast cancer gene -- MEHRDAD/BSO and mastectomy     Pancreatic Cancer Maternal Uncle 38     Alcohol/Drug Brother         active     Alcohol/Drug Brother         recovering     Psychotic Disorder Brother         depression and ADHD     Breast Cancer Maternal Aunt 35        & MC age 36     Uterine Cancer Maternal Aunt      C.A.D. No family hx of      Diabetes No family hx of      Hypertension No family hx of      Coronary Artery Disease No family hx of      Cerebrovascular Disease No family hx of      Hyperlipidemia No family hx of      Colon Cancer No family hx of      Prostate Cancer No family hx of      Thyroid Disease No family hx of        Social History     Tobacco Use     Smoking status: Former Smoker     Packs/day: 0.50     Years: 10.00     Pack years: 5.00     Types: Cigarettes     Start date: 2005     Quit date: 2018     Years since quitting: 3.8     Smokeless tobacco: Never Used   Substance Use Topics     Alcohol use: No     Comment: 1 per year        Patient Active Problem List  "  Diagnosis     Mother  of ovarian cancer, age 47     Vitamin d deficiency 23     Female genital symptoms     Encounter for routine gynecological examination     Endometriosis     BRCA1 gene mutation positive     Pelvic pain in female     BRCA gene mutation positive     Acquired absence of both breasts and nipples     Allergies   Allergen Reactions     Chloraprep One Step Rash     Current Outpatient Medications   Medication Sig Dispense Refill     citalopram (CELEXA) 40 MG tablet TAKE 1 TABLET BY MOUTH EVERY DAY 30 tablet 3     estrogen conj (PREMARIN) 0.3 MG tablet Take 1 tablet (0.3 mg) by mouth daily 90 tablet 1     hydrOXYzine (ATARAX) 10 MG tablet Take 1 tablet (10 mg) by mouth 3 times daily as needed for itching 90 tablet 3     Vitals: BP 90/60   Pulse 69   Ht 1.549 m (5' 1\")   Wt 53.5 kg (118 lb)   LMP 2016   BMI 22.30 kg/m    BMI= Body mass index is 22.3 kg/m .    EXAM:  GENERAL: healthy, alert and no distress   BREAST:  Breasts are surgically absent. Implants are in place. Scars well healed.   There is no axillary or supraclavicular lymphadenopathy. Specifically, I do not feel any nodes in either axilla on deep exam.   CARDIOVASCULAR:  RRR  RESPIRATORY: nonlabored breathing  NECK: Neck supple. No adenopathy. Thyroid symmetric, normal size  SKIN: No suspicious lesions or rashes  LYMPH: Normal cervical lymph nodes      ASSESSMENT/PLAN:  Celeste Arguello is a 37yof s/p bilateral mastectomies in 2019 with concern of possible tender adenopathy in the axilla. Her exam today is benign. We discussed common causes of adenopathy and if she has recurrence of symptoms, she will let me know and we will further evaluate with axillary ultrasound. She is comfortable with this plan.           Roseann Boyd MD  [facility]  [phone]        Please route or send letter to:  Primary Care Provider (PCP) and Referring Provider    "

## 2022-06-28 ENCOUNTER — ONCOLOGY VISIT (OUTPATIENT)
Dept: ONCOLOGY | Facility: CLINIC | Age: 37
End: 2022-06-28
Attending: NURSE PRACTITIONER
Payer: COMMERCIAL

## 2022-06-28 VITALS
TEMPERATURE: 98 F | HEIGHT: 62 IN | WEIGHT: 120.5 LBS | HEART RATE: 62 BPM | SYSTOLIC BLOOD PRESSURE: 104 MMHG | DIASTOLIC BLOOD PRESSURE: 66 MMHG | RESPIRATION RATE: 18 BRPM | OXYGEN SATURATION: 98 % | BODY MASS INDEX: 22.18 KG/M2

## 2022-06-28 DIAGNOSIS — D22.9 ENLARGED SKIN MOLE: Primary | ICD-10-CM

## 2022-06-28 DIAGNOSIS — Z80.0 FAMILY HISTORY OF PANCREATIC CANCER: ICD-10-CM

## 2022-06-28 DIAGNOSIS — Z15.01 BRCA1 POSITIVE: ICD-10-CM

## 2022-06-28 DIAGNOSIS — Z15.09 BRCA1 POSITIVE: ICD-10-CM

## 2022-06-28 PROCEDURE — G0463 HOSPITAL OUTPT CLINIC VISIT: HCPCS

## 2022-06-28 PROCEDURE — 99215 OFFICE O/P EST HI 40 MIN: CPT | Performed by: CLINICAL NURSE SPECIALIST

## 2022-06-28 ASSESSMENT — PAIN SCALES - GENERAL: PAINLEVEL: NO PAIN (0)

## 2022-06-28 NOTE — NURSING NOTE
"Oncology Rooming Note    June 28, 2022 9:39 AM   Celeste Arguello is a 37 year old female who presents for:    Chief Complaint   Patient presents with     Oncology Clinic Visit     BRCA 1 positive      Initial Vitals: /66   Pulse 62   Temp 98  F (36.7  C) (Tympanic)   Resp 18   Ht 1.575 m (5' 2\")   Wt 54.7 kg (120 lb 8 oz)   LMP 12/17/2016   SpO2 98%   BMI 22.04 kg/m   Estimated body mass index is 22.04 kg/m  as calculated from the following:    Height as of this encounter: 1.575 m (5' 2\").    Weight as of this encounter: 54.7 kg (120 lb 8 oz). Body surface area is 1.55 meters squared.  No Pain (0) Comment: Data Unavailable   Patient's last menstrual period was 12/17/2016.  Allergies reviewed: Yes  Medications reviewed: Yes    Medications: Medication refills not needed today.  Pharmacy name entered into [employer] ([place]):    [facility] [address] [place] [address] & [id number] [address] [address] [place] [address]    Clinical concerns: New patient.        Beronica Lindsay [facility]            "

## 2022-06-28 NOTE — LETTER
2022         RE: Celeste Arguello  [address]  [place] [address]        Dear Colleague,    Thank you for referring your patient, Celeste Arguello, to the [facility]. Please see a copy of my visit note below.    Oncology Risk Management Consultation:  Date on this visit: 2022    Celeste Arguello  returns to the clinic today for a follow up appointment. She requires high risk screening and surveillance to reduce her risk of cancer secondary to Hereditary Breast and Ovarian Cancer Syndrome, linked to a BRCA1 mutation.  She is considered to be at high risk for hereditary breast and ovarian cancer. She has mitigated her risk by having a total laparoscopic hysterectomy and bilateral salpingo oophorectomy in 2018 as well as a risk reducing bilateral mastectomy in 2019.       Primary Physician: ENRIQUE Renae-CNP    History Of Present Illness:  Ms. Arguello is a very pleasant 37 year old female who presents with a family history of breast and ovarian cancer and a confirmed BRCA1 mutation.    2018 - rosa KNOTT. LEXIM with Dr. Boyd and Gerard     Genetic Testin2018 - POSITIVE for a BRCA1 mutation. Specifically her mutation is called c.181T>G (p.C61G) identified using [employer] testing from [facility]. This testing was done because of her family history of ovarian and breast cancer.  Of note, Celeste tested negative for mutations in the following genes by sequencing and deletion/duplication analysis: ALEM, BARD1, BRCA1, BRCA2, BRIP1, CDH1, CHEK2, DICER1, EPCAM, MLH1, MRE11A, MSH2, MSH6, MUTYH, NBN, NF1, PALB2, PMS2, PTEN, RAD50, RAD51C, RAD51D, SMARCA4, STK11, and TP53 genes.     2018 - ANGIE (Dr. MCKENZIE SANDERSON) PATHOLOGY:   A: IUD   B: Uterus, cervix, bilateral fallopian tubes and ovaries     FINAL DIAGNOSIS:   A. IUD, REMOVAL:   - Intrauterine device identified (gross only)     B. UTERUS, CERVIX, BILATERAL  FALLOPIAN TUBES AND OVARIES, TOTAL   LAPAROSCOPIC HYSTERECTOMY AND BILATERAL   SALPINGO-OOPHORECTOMY:   - Endometrium with inactive glands and decidualized stroma consistent with    exogenous progesterone effect   - Myometrium with no significant histologic findings   - Cervix with no significant histologic findings   - Benign unremarkable right ovary   - Left ovary with corpus luteum cyst and adhesions   - Right fallopian tube with paratubal cysts and Walthard's rests   - Left fallopian tube with no significant histologic findings     Hx of estradiol PO 0.3 mg daily x 2 years post risk reducing salpingo oophorectomy    4/8/2019- FINAL DIAGNOSIS:   A: Breast, left, prophylactic mastectomy: Negative for malignancy.     B: Breast, right, prophylactic mastectomy: Negative for malignancy.     8/27/2018- Dermatology check (Dr. Fracisco Spencer) - all benign findings.    Review of Systems:  GENERAL: No change in weight, sleep or appetite.  Normal energy.  No fever or chills  EYES: Negative for vision changes or eye problems  ENT: No problems with ears, nose or throat.  No difficulty swallowing.  RESP: No coughing, wheezing or shortness of breath  CV: No chest pains or palpitations  GI: No nausea, vomiting,  heartburn, abdominal pain, diarrhea, constipation or change in bowel habits  : No urinary frequency or dysuria, bladder or kidney problems  MUSCULOSKELETAL: No significant muscle or joint pains  NEUROLOGIC: No headaches, numbness, tingling, weakness, problems with balance or coordination  PSYCHIATRIC: No problems with anxiety, depression or mental health  HEME/IMMUNE/ALLERGY: No history of bleeding or clotting problems or anemia.  No allergies or immune system problems  ENDOCRINE: No history of thyroid disease, diabetes or other endocrine disorders  SKIN: No rashes,one dark brown irregularly shaped mole on upper right chest.      Past Medical/Surgical History:  Past Medical History:   Diagnosis Date     BRCA1 gene  mutation positive 6/15/2018    BRCA1 c.181T>G (p.C61G) tested at [facility] 2018     Vertigo, benign paroxysmal, bilateral      Past Surgical History:   Procedure Laterality Date     CYSTOSCOPY N/A 2018    Procedure: CYSTOSCOPY;;  Surgeon: Marybeth Grande MD;  Location:  OR     ENT SURGERY      tonsillectomy     HYSTERECTOMY TOTAL ABDOMINAL, BILATERAL SALPINGO-OOPHORECTOMY, NODE DISSECTION, COMBINED Bilateral 2018    Procedure: COMBINED HYSTERECTOMY TOTAL ABDOMINAL, SALPINGO-OOPHORECTOMY, NODE DISSECTION;;  Surgeon: Marybeth Grande MD;  Location:  OR     INSERT TISSUE EXPANDER BREAST BILATERAL Bilateral 2019    Procedure: BILATERAL TISSUE EXPANDERS (GILDARDO);  Surgeon: Emily Gee MD;  Location:  OR     LAPAROSCOPIC HYSTERECTOMY TOTAL, BILATERAL SALPINGO-OOPHORECTOMY, NODE DISSECTION, COMBINED N/A 2018    Procedure: COMBINED LAPAROSCOPIC HYSTERECTOMY TOTAL, SALPINGO-OOPHORECTOMY, NODE DISSECTION;  Laparoscopic Removal Of Uterus, Cervix, Both Ovaries And Fallopian Tubes, Cystoscopy ;  Surgeon: Marybeth Grande MD;  Location:  OR     LAPAROSCOPY DIAGNOSTIC (GYN)      endometriosis     MASTECTOMY SIMPLE BILATERAL Bilateral 2019    Procedure: BILATERAL PROPHYLACTIC MASTECTOMY (JUAN);  Surgeon: Roseann Boyd MD;  Location:  OR     urethral reimplant  age 6       Allergies:  Allergies as of 2022 - Reviewed 2022   Allergen Reaction Noted     Chloraprep one step Rash 2018       Current Medications:  No current outpatient medications on file.        Family History:  Family History   Problem Relation Age of Onset     Ovarian Cancer Mother 38         age 47     Alcohol/Drug Brother         active     Alcohol/Drug Brother 41        cause of death     Psychotic Disorder Brother         depression and ADHD     Hereditary Breast and Ovarian Cancer Syndrome Maternal Aunt 50        Breast cancer gene -- MEHRDAD/BSO and mastectomy      Breast Cancer Maternal Aunt 35        & MC age 36     Uterine Cancer Maternal Aunt      Pancreatic Cancer Maternal Uncle 38     Cerebral aneurysm Maternal Cousin      C.A.D. 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 Not Asked   Social History Narrative    How much exercise per week? One    How much calcium per day? Dairy products       How much caffeine per day? 1 cup coffee and 2 can of coke    How much vitamin D per day? None    Do you/your family wear seatbelts?  Yes    Do you/your family use safety helmets? yes    Do you/your family use sunscreen? Yes    Do you/your family keep firearms in the home? No    Do you/your family have a smoke detector(s)? Yes        Do you feel safe in your home? Yes    Has anyone ever touched you in an unwanted manner? No     Explain     SEE GLADYS [facility]     06/16/2014    Beronica Dumas [facility] 04/26/18         Social Determinants of Health  "    Financial Resource Strain: Not on file   Food Insecurity: Not on file   Transportation Needs: Not on file   Physical Activity: Not on file   Stress: Not on file   Social Connections: Not on file   Intimate Partner Violence: Not on file   Housing Stability: Not on file       Physical Exam:  /66   Pulse 62   Temp 98  F (36.7  C) (Tympanic)   Resp 18   Ht 1.575 m (5' 2\")   Wt 54.7 kg (120 lb 8 oz)   LMP 2016   SpO2 98%   BMI 22.04 kg/m    GENERAL: Healthy, alert and no distress  EYES: Eyes grossly normal to inspection.  No discharge or erythema, or obvious scleral/conjunctival abnormalities.  RESP: No audible wheeze, cough, or visible cyanosis.  No visible retractions or increased work of breathing.    SKIN: Visible skin clear. No significant rash, abnormal pigmentation or lesions.  NEURO: Cranial nerves grossly intact.  Mentation and speech appropriate for age.  PSYCH: Mentation appears normal, affect normal/bright, judgement and insight intact, normal speech and appearance well-groomed.    Laboratory/Imaging Studies  No results found for any visits on 22.    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  She is active, does not have a formal exercise program but does \"dance around the house a lot\" and gets exercise by increasing activity in her daily life.    We reviewed her interval history and her prior concerns about her enlarged axillary lymph node. She notes that this has completely self resolved.    We discussed her family history. She has a maternal aunt and maternal cousin with a history of cerebral aneurysms. Her cousin had her aneurysm stented and her aunt survived two episode with aneurysms.She may want to consider an angiogram and should discuss this with her primary care provider.    We discussed that  Celeste's 41 year old brother recently  of substance abuse issues and she and her daughter recently spread his " aristeo in [place].     We also discussed her use of premarin medication after her THBSO.  I did check with Gynecology-Oncology after our visit and confirmed that it is OK for her to go off of the medication.We could consider doing a DEXA scan to get a baseline so as to find out whether there are any changes in the future from loss of estrogen.     INDIVIDUALIZED CANCER RISK MANAGEMENT PLAN:      Referral to Dr. Juice Harris for discussion of pancreatic screening due to maternal uncle's history of pancreatic cancer at 38    Referral to Dr. Jennifer Stover in Dermatology for assessment of mole on her chest.     Continue routine eye exams    Return to clinic in one year for an in person visit to review guidelines.     I spent a total of 60 minutes on the day of the visit. Please see the note for further information on patient assessment and treatment.          Again, thank you for allowing me to participate in the care of your patient.      Sincerely,    ENRIQUE Cat CNS

## 2022-06-28 NOTE — Clinical Note
Saw Celeste today in clinic for BRCA1+ and am getting her in with Dr. Harris for pancreatic screening considering her mat. Uncle's pancreatic cancer at 38 (no risk factors, possible BRCA1+). Dr. Benitez was with me in clinic and we discussed whether Celeste should go back on the premarin 0.3 mg daily (she had stopped it and is doing well - no hot flashes.)  Dr. Benitez said it's OK to keep her off but I wanted to be sure you were in agreement with that.  I looked at some literature - did not see a compelling reason to keep her on it at this point.  May order a DEXA next year.  Your thoughts? Thanks.  Miesha Flowers, ENRIQUE-CNS, OCN, AGN-BC Clinical Nurse Specialist Cancer Risk Management Program [address]

## 2022-06-28 NOTE — PROGRESS NOTES
Oncology Risk Management Consultation:  Date on this visit: 2022    Celeste Arguello  returns to the clinic today for a follow up appointment. She requires high risk screening and surveillance to reduce her risk of cancer secondary to Hereditary Breast and Ovarian Cancer Syndrome, linked to a BRCA1 mutation.  She is considered to be at high risk for hereditary breast and ovarian cancer. She has mitigated her risk by having a total laparoscopic hysterectomy and bilateral salpingo oophorectomy in 2018 as well as a risk reducing bilateral mastectomy in 2019.       Primary Physician: ENRIQUE Renae-CNP    History Of Present Illness:  Ms. Arguello is a very pleasant 37 year old female who presents with a family history of breast and ovarian cancer and a confirmed BRCA1 mutation.    2018 - mateus KNOTTM with Dr. Boyd and Gerard     Genetic Testin2018 - POSITIVE for a BRCA1 mutation. Specifically her mutation is called c.181T>G (p.C61G) identified using O[employer] Next testing from [employer]. This testing was done because of her family history of ovarian and breast cancer.  Of note, Celeste tested negative for mutations in the following genes by sequencing and deletion/duplication analysis: ALEM, BARD1, BRCA1, BRCA2, BRIP1, CDH1, CHEK2, DICER1, EPCAM, MLH1, MRE11A, MSH2, MSH6, MUTYH, NBN, NF1, PALB2, PMS2, PTEN, RAD50, RAD51C, RAD51D, SMARCA4, STK11, and TP53 genes.     2018 - [facility] (Dr. MCKENZIE SANDERSON) PATHOLOGY:   A: IUD   B: Uterus, cervix, bilateral fallopian tubes and ovaries     FINAL DIAGNOSIS:   A. IUD, REMOVAL:   - Intrauterine device identified (gross only)     B. UTERUS, CERVIX, BILATERAL FALLOPIAN TUBES AND OVARIES, TOTAL   LAPAROSCOPIC HYSTERECTOMY AND BILATERAL   SALPINGO-OOPHORECTOMY:   - Endometrium with inactive glands and decidualized stroma consistent with    exogenous progesterone effect   - Myometrium with no significant histologic findings   -  Cervix with no significant histologic findings   - Benign unremarkable right ovary   - Left ovary with corpus luteum cyst and adhesions   - Right fallopian tube with paratubal cysts and Walthard's rests   - Left fallopian tube with no significant histologic findings     Hx of estradiol PO 0.3 mg daily x 2 years post risk reducing salpingo oophorectomy    4/8/2019- FINAL DIAGNOSIS:   A: Breast, left, prophylactic mastectomy: Negative for malignancy.     B: Breast, right, prophylactic mastectomy: Negative for malignancy.     8/27/2018- Dermatology check (Dr. Fracisco Spencer) - all benign findings.    Review of Systems:  GENERAL: No change in weight, sleep or appetite.  Normal energy.  No fever or chills  EYES: Negative for vision changes or eye problems  ENT: No problems with ears, nose or throat.  No difficulty swallowing.  RESP: No coughing, wheezing or shortness of breath  CV: No chest pains or palpitations  GI: No nausea, vomiting,  heartburn, abdominal pain, diarrhea, constipation or change in bowel habits  : No urinary frequency or dysuria, bladder or kidney problems  MUSCULOSKELETAL: No significant muscle or joint pains  NEUROLOGIC: No headaches, numbness, tingling, weakness, problems with balance or coordination  PSYCHIATRIC: No problems with anxiety, depression or mental health  HEME/IMMUNE/ALLERGY: No history of bleeding or clotting problems or anemia.  No allergies or immune system problems  ENDOCRINE: No history of thyroid disease, diabetes or other endocrine disorders  SKIN: No rashes,one dark brown irregularly shaped mole on upper right chest.      Past Medical/Surgical History:  Past Medical History:   Diagnosis Date     BRCA1 gene mutation positive 6/15/2018    BRCA1 c.181T>G (p.C61G) tested at [facility] 5/9/2018     Vertigo, benign paroxysmal, bilateral      Past Surgical History:   Procedure Laterality Date     CYSTOSCOPY N/A 9/12/2018    Procedure: CYSTOSCOPY;;  Surgeon: Marybeth Grande MD;   Location: [facility] OR     ENT SURGERY      tonsillectomy     HYSTERECTOMY TOTAL ABDOMINAL, BILATERAL SALPINGO-OOPHORECTOMY, NODE DISSECTION, COMBINED Bilateral 2018    Procedure: COMBINED HYSTERECTOMY TOTAL ABDOMINAL, SALPINGO-OOPHORECTOMY, NODE DISSECTION;;  Surgeon: Marybeth Grande MD;  Location: [facility] OR     INSERT TISSUE EXPANDER BREAST BILATERAL Bilateral 2019    Procedure: BILATERAL TISSUE EXPANDERS (GILDARDO);  Surgeon: Emily Gee MD;  Location: [facility] OR     LAPAROSCOPIC HYSTERECTOMY TOTAL, BILATERAL SALPINGO-OOPHORECTOMY, NODE DISSECTION, COMBINED N/A 2018    Procedure: COMBINED LAPAROSCOPIC HYSTERECTOMY TOTAL, SALPINGO-OOPHORECTOMY, NODE DISSECTION;  Laparoscopic Removal Of Uterus, Cervix, Both Ovaries And Fallopian Tubes, Cystoscopy ;  Surgeon: Marybeth Grande MD;  Location: [facility] OR     LAPAROSCOPY DIAGNOSTIC (GYN)  2006    endometriosis     MASTECTOMY SIMPLE BILATERAL Bilateral 2019    Procedure: BILATERAL PROPHYLACTIC MASTECTOMY (JUAN);  Surgeon: Roseann Boyd MD;  Location: [facility] OR     urethral reimplant  age 6       Allergies:  Allergies as of 2022 - Reviewed 2022   Allergen Reaction Noted     Chloraprep one step Rash 2018       Current Medications:  No current outpatient medications on file.        Family History:  Family History   Problem Relation Age of Onset     Ovarian Cancer Mother 38         age 47     Alcohol/Drug Brother         active     Alcohol/Drug Brother 41        cause of death     Psychotic Disorder Brother         depression and ADHD     Hereditary Breast and Ovarian Cancer Syndrome Maternal Aunt 50        Breast cancer gene -- MEHRDAD/BSO and mastectomy     Breast Cancer Maternal Aunt 35        & MC age 36     Uterine Cancer Maternal Aunt      Pancreatic Cancer Maternal Uncle 38     Cerebral aneurysm Maternal Cousin      C.A.D. 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 Not Asked   Social History Narrative    How much exercise per week? One    How much calcium per day? Dairy products       How much caffeine per day? 1 cup coffee and 2 can of coke    How much vitamin D per day? None    Do you/your family wear seatbelts?  Yes    Do you/your family use safety helmets? yes    Do you/your family use sunscreen? Yes    Do you/your family keep firearms in the home? No    Do you/your family have a smoke detector(s)? Yes        Do you feel safe in your home? Yes    Has anyone ever touched you in an unwanted manner? No     Explain     SEE GLADYS [facility]     06/16/2014    Beronica Dumas [facility] 04/26/18         Social Determinants of Health     Financial Resource Strain: Not on file   Food Insecurity: Not on file   Transportation Needs: Not on file   Physical Activity: Not on file   Stress: Not on file   Social Connections: Not on file   Intimate Partner Violence: Not on file   Housing Stability: Not on file  "      Physical Exam:  /66   Pulse 62   Temp 98  F (36.7  C) (Tympanic)   Resp 18   Ht 1.575 m (5' 2\")   Wt 54.7 kg (120 lb 8 oz)   LMP 2016   SpO2 98%   BMI 22.04 kg/m    GENERAL: Healthy, alert and no distress  EYES: Eyes grossly normal to inspection.  No discharge or erythema, or obvious scleral/conjunctival abnormalities.  RESP: No audible wheeze, cough, or visible cyanosis.  No visible retractions or increased work of breathing.    SKIN: Visible skin clear. No significant rash, abnormal pigmentation or lesions.  NEURO: Cranial nerves grossly intact.  Mentation and speech appropriate for age.  PSYCH: Mentation appears normal, affect normal/bright, judgement and insight intact, normal speech and appearance well-groomed.    Laboratory/Imaging Studies  No results found for any visits on 22.    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  She is active, does not have a formal exercise program but does \"dance around the house a lot\" and gets exercise by increasing activity in her daily life.    We reviewed her interval history and her prior concerns about her enlarged axillary lymph node. She notes that this has completely self resolved.    We discussed her family history. She has a maternal aunt and maternal cousin with a history of cerebral aneurysms. Her cousin had her aneurysm stented and her aunt survived two episode with aneurysms.She may want to consider an angiogram and should discuss this with her primary care provider.    We discussed that  Celeste's 41 year old brother recently  of substance abuse issues and she and her daughter recently spread his ashes in [place].     We also discussed her use of premarin medication after her THBSO.  I did check with Gynecology-Oncology after our visit and confirmed that it is OK for her to go off of the medication.We could consider doing a DEXA scan to get a baseline so as to find " out whether there are any changes in the future from loss of estrogen.     INDIVIDUALIZED CANCER RISK MANAGEMENT PLAN:      Referral to Dr. Juice Harris for discussion of pancreatic screening due to maternal uncle's history of pancreatic cancer at 38    Referral to Dr. Jennifer Stover in Dermatology for assessment of mole on her chest.     Continue routine eye exams    Return to clinic in one year for an in person visit to review guidelines.     I spent a total of 60 minutes on the day of the visit. Please see the note for further information on patient assessment and treatment.    ENRIQUE Cat-CNS, OCN, AGN-BC  Clinical Nurse Specialist  Cancer Risk Management Program  [facility]    CC: ENRIQUE Renae-NP

## 2022-07-07 ENCOUNTER — OFFICE VISIT (OUTPATIENT)
Dept: FAMILY MEDICINE | Facility: CLINIC | Age: 37
End: 2022-07-07
Payer: COMMERCIAL

## 2022-07-07 VITALS
HEIGHT: 62 IN | HEART RATE: 72 BPM | RESPIRATION RATE: 18 BRPM | OXYGEN SATURATION: 99 % | SYSTOLIC BLOOD PRESSURE: 116 MMHG | TEMPERATURE: 99 F | WEIGHT: 120.8 LBS | BODY MASS INDEX: 22.23 KG/M2 | DIASTOLIC BLOOD PRESSURE: 80 MMHG

## 2022-07-07 DIAGNOSIS — M26.621 TMJ TENDERNESS, RIGHT: Primary | ICD-10-CM

## 2022-07-07 PROCEDURE — 99213 OFFICE O/P EST LOW 20 MIN: CPT | Performed by: FAMILY MEDICINE

## 2022-07-07 ASSESSMENT — PAIN SCALES - GENERAL: PAINLEVEL: SEVERE PAIN (6)

## 2022-07-07 NOTE — PATIENT INSTRUCTIONS
Begin Ibuprofen 600 mg three times a day with food for 4-5 days.  If symptoms worsen or persist, follow up is recommended.

## 2022-07-07 NOTE — PROGRESS NOTES
"  Assessment & Plan     TMJ tenderness, right  No sign of infection or dental issues noted.  I have recommended NSAID, information for TMJ given.    If symptoms of  Pain persist after antiinflammatory use, follow up is recommended.                 Patient Instructions   Begin Ibuprofen 600 mg three times a day with food for 4-5 days.  If symptoms worsen or persist, follow up is recommended.            Return in about 8 months (around 3/7/2023) for Physical Exam.    Lucy Smith MD  [facility]   Celeste is a 37 year old presenting for the following health issues:  Ear Problem      History of Present Illness       Reason for visit:  Ear pain  Symptom onset:  1-3 days ago  Symptoms include:  I hear a whooshing sound when I eat. Periodic pain behind my ear and inside my ear, along with jaw pain. I also have a headache and have been sleeping more since it began.  Symptom intensity:  Mild  Symptom progression:  Worsening  Had these symptoms before:  No  What makes it worse:  Eating    She eats 2-3 servings of fruits and vegetables daily.She consumes 2 sweetened beverage(s) daily.She exercises with enough effort to increase her heart rate 10 to 19 minutes per day.  She exercises with enough effort to increase her heart rate 4 days per week.   She is taking medications regularly.       Concern - RT ear pain  Onset:  07/04/22  Description: has vertigo for last 4 years but has never had pain with her vertigo. Hurts to chew, feels full and there is a \"whooshing\" sound when eating.  Intensity: moderate  Progression of Symptoms:  worsening  Accompanying Signs & Symptoms: sleeping a lot more  Previous history of similar problem: no  Precipitating factors:        Worsened by: unsure  Alleviating factors:        Improved by: nothing  Therapies tried and outcome:  ibuprofen  Appetite normal but does not want to eat due to the whooshing sound.       Review of Systems   Constitutional, HEENT, " "cardiovascular, pulmonary, gi and gu systems are negative, except as otherwise noted.      Objective    /80 (BP Location: Right arm, Patient Position: Sitting, Cuff Size: Adult Regular)   Pulse 72   Temp 99  F (37.2  C) (Oral)   Resp 18   Ht 1.575 m (5' 2\")   Wt 54.8 kg (120 lb 12.8 oz)   LMP 12/17/2016   SpO2 99%   BMI 22.09 kg/m    Body mass index is 22.09 kg/m .  Physical Exam   GENERAL: healthy, alert and no distress  EYES: Eyes grossly normal to inspection, PERRL and conjunctivae and sclerae normal  HENT: normal cephalic/atraumatic, right ear: normal: no effusions, no erythema, normal landmarks and mild tenderness in the ear canal, left ear: normal: no effusions, no erythema, normal landmarks, nose and mouth without ulcers or lesions, oropharynx clear, oral mucous membranes moist, sinuses: not tender and tenderness over the right TMJ extending to the angle of the mandibule.  No click TMJ  NECK: no adenopathy, no asymmetry, masses, or scars, thyroid normal to palpation and mid tenderness AC area without abnormality palpable.  RESP: lungs clear to auscultation - no rales, rhonchi or wheezes  CV: regular rate and rhythm, normal S1 S2, no S3 or S4, no murmur, click or rub, no peripheral edema and peripheral pulses strong  MS: no gross musculoskeletal defects noted, no edema  SKIN: no suspicious lesions or rashes  PSYCH: mentation appears normal, affect normal/bright                    .  ..  "

## 2022-09-08 ENCOUNTER — OFFICE VISIT (OUTPATIENT)
Dept: GASTROENTEROLOGY | Facility: CLINIC | Age: 37
End: 2022-09-08
Attending: CLINICAL NURSE SPECIALIST
Payer: COMMERCIAL

## 2022-09-08 VITALS
BODY MASS INDEX: 21.92 KG/M2 | OXYGEN SATURATION: 99 % | HEART RATE: 69 BPM | DIASTOLIC BLOOD PRESSURE: 85 MMHG | HEIGHT: 62 IN | WEIGHT: 119.1 LBS | SYSTOLIC BLOOD PRESSURE: 118 MMHG

## 2022-09-08 DIAGNOSIS — Z15.01 BRCA1 POSITIVE: ICD-10-CM

## 2022-09-08 DIAGNOSIS — Z80.0 FAMILY HISTORY OF PANCREATIC CANCER: ICD-10-CM

## 2022-09-08 DIAGNOSIS — Z15.09 BRCA1 POSITIVE: ICD-10-CM

## 2022-09-08 PROCEDURE — 99204 OFFICE O/P NEW MOD 45 MIN: CPT | Performed by: INTERNAL MEDICINE

## 2022-09-08 NOTE — PATIENT INSTRUCTIONS
Follow up:    Dr. Harris has outlined the following steps after your recent clinic visit:    MRI/MRCP. Please call [phone] to schedule the imaging.       Please call with any questions or concerns regarding your clinic visit today.    It is a pleasure being involved in your health care.    Contacts post-consultation depending on your need:    Schedule Clinic Appointments            [phone] # 1   M-F 7:30 - 5 pm    Olga Gant, RN Care Coordinator (Dr. Harris/Dr. Duran)  [phone]    Marybeth Koenig, RN Care Coordinator (Dr. Cedeño)   [phone]    Marie Saleh, RN Care Coordinator (Dr. Ayala/Dr. Loaiza)  [phone]     OR Procedure Scheduling                                 [phone]    For urgent/emergent questions after business hours, you may reach the on-call GI Fellow by contacting the [facility]  at [phone].    How do I schedule labs, imaging studies, or procedures that were ordered in clinic today?     Labs: To schedule lab appointment at the [facility], use my chart or call [phone]. If you have a [place] lab closer to home where you are regularly seen you can give them a call.     Procedures: If a colonoscopy, upper endoscopy, breath test, esophageal manometry, or pH impedence was ordered today, our endoscopy team will call you to schedule this. If you have not heard from our endoscopy team within a week, please call [phone] to schedule.     Imaging Studies: If you were scheduled for a CT scan, X-ray, MRI, ultrasound, HIDA scan or other imaging study, please call [phone] to have this scheduled.     Referral: If a referral to another specialty was ordered, expect a phone call or follow instructions above. If you have not heard from anyone regarding your referral in a week, please call our clinic to check the status.     How to I schedule a follow-up visit?  If you did not schedule a follow-up visit today, please call  [phone] to schedule a follow-up office visit.

## 2022-09-08 NOTE — PROGRESS NOTES
[facility]    CC/Referring Physician:  Miesha Flowers  Question for Consultation:  BRCA1 and family history of pancreatic cancer    Assessment and Plan:  Ms Arguello is a 38yo woman with BRCA1 and a second degree relative diagnosed with pancreatic cancer at asge 38. She is at an increased risk of pancreatic cancer approximate to be around 3-5% lifetime risk. Current guidelines suggest she should begin screening at age 50 as her affected relative is second degree. That said she has reasonable concerns about her developing pancreatic cancer and has vague abdominal pain. Therefore it is reasonable to obtain at least imaging at this juncture and decide on further surveillance and or screening based on these findings. We discussed accepted modes of screening, including both EUS and MRI/MRCP. Both are equally sensitive, though EUS requires anesthesia/sedation and is minimally invasive while allowing for sampling, and MRI/MRCP is noninvasive. Therefore, we will begin screening now with an MRI/MRCP and reflex an EUS for further evaluation and sampling when appropriate based on findings of the MRI. This will continue yearly for screening. We also reviewed the possibility of finding pancreatic cysts which themselves will deserve ongoing surveillance. She will attempt to stop vaping and her minimal alcohol use likely will have no impact on her clinical course.    She will begin average risk colon cancer screening at age 46.    RTC as clinical course dictates    It was a pleasure to participate in the care of this patient; please contact us with any further questions.  A total of at least 60 minutes was spent in evaluation of this patient today, >50% of which was discussion and counseling regarding the above delineated issues.      Juice Harris MD PhD FACG OMA POMPA  Associate Professor of Medicine, Surgery and Pediatrics  Interventional and Therapeutic Endoscopy  GI  Service Line Medical Director    [facility]  Division of Gastroenterology and Hepatology  [facility] [address], [place] [address]    New Consultations  [phone]  Procedure Scheduling [phone]  Clinical Nurse Coordinator [phone]  Clinical Fax   [phone]  Administrative   [phone]  Administrative Fax  [phone]    -------------------------------------------------------------------------------------------------------------------  History of Presentation:  Ms Arguello is a 38yo woman with a BRCA1 mutation status post hysterectomy, bilateral salpingo oophorectomy and bilateral mastectomy who also has a single second degree relative diagnosed at age 38 (maternal uncle). She is a former smoker of tobacco, vapes every so often, and has no history of alcohol abuse, but drinks a glass of wine about 10x per year. Her BMI is 21. She has no history of pancreatitis though notes some nonspecific abdominal pain. Her bowel habits are regular.    Review of systems:  A twelve point review was performed and was otherwise negative beyond what is mentioned in the history above.    Pertinent past medical history:  Past Medical History:   Diagnosis Date     BRCA1 gene mutation positive 6/15/2018    BRCA1 c.181T>G (p.C61G) tested at [facility] 5/9/2018     Vertigo, benign paroxysmal, bilateral      Previous surgeries:  Past Surgical History:   Procedure Laterality Date     CYSTOSCOPY N/A 9/12/2018    Procedure: CYSTOSCOPY;;  Surgeon: Marybeth Grande MD;  [facility]     ENT SURGERY      tonsillectomy     HYSTERECTOMY TOTAL ABDOMINAL, BILATERAL SALPINGO-OOPHORECTOMY, NODE DISSECTION, COMBINED Bilateral 9/12/2018    Procedure: COMBINED HYSTERECTOMY TOTAL ABDOMINAL, SALPINGO-OOPHORECTOMY, NODE DISSECTION;;  Surgeon: Marybeth Grande MD;  [facility]     INSERT TISSUE EXPANDER BREAST BILATERAL Bilateral 4/8/2019    Procedure: BILATERAL TISSUE  EXPANDERS (GILDARDO);  Surgeon: Emily Gee MD;  Location[facility]     LAPAROSCOPIC HYSTERECTOMY TOTAL, BILATERAL SALPINGO-OOPHORECTOMY, NODE DISSECTION, COMBINED N/A 2018    Procedure: COMBINED LAPAROSCOPIC HYSTERECTOMY TOTAL, SALPINGO-OOPHORECTOMY, NODE DISSECTION;  Laparoscopic Removal Of Uterus, Cervix, Both Ovaries And Fallopian Tubes, Cystoscopy ;  Surgeon: Marybeth Grande MD;  [facility]     LAPAROSCOPY DIAGNOSTIC (GYN)      endometriosis     MASTECTOMY SIMPLE BILATERAL Bilateral 2019    Procedure: BILATERAL PROPHYLACTIC MASTECTOMY (JUAN);  Surgeon: Roseann Boyd MD;  Location: [facility] OR     urethral reimplant  age 6       Current mediations:  No current outpatient medications on file.     No current facility-administered medications for this visit.     Medications reviewed with patient today, see Medication List/Assessment for details.  No other NSAID/anticoagulation reported by patient.  No other OTC/herbal/supplements reported by patient.    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 Not Asked   Social History Narrative    How much exercise per week? One    How much calcium per day? Dairy products       How  much caffeine per day? 1 cup coffee and 2 can of coke    How much vitamin D per day? None    Do you/your family wear seatbelts?  Yes    Do you/your family use safety helmets? yes    Do you/your family use sunscreen? Yes    Do you/your family keep firearms in the home? No    Do you/your family have a smoke detector(s)? Yes        Do you feel safe in your home? Yes    Has anyone ever touched you in an unwanted manner? No     Explain     SEE GLADYS [facility]     2014    Beronica Dumas [facility] 19         Social Determinants of Health     Financial Resource Strain: Not on file   Food Insecurity: Not on file   Transportation Needs: Not on file   Physical Activity: Not on file   Stress: Not on file   Social Connections: Not on file   Intimate Partner Violence: Not on file   Housing Stability: Not on file       Family history:  Family History   Problem Relation Age of Onset     Ovarian Cancer Mother 38         age 47     Alcohol/Drug Brother         active     Alcohol/Drug Brother 41        cause of death     Psychotic Disorder Brother         depression and ADHD     Hereditary Breast and Ovarian Cancer Syndrome Maternal Aunt 50        Breast cancer gene -- MEHRDAD/BSO and mastectomy     Breast Cancer Maternal Aunt 35        & MC age 36     Uterine Cancer Maternal Aunt      Pancreatic Cancer Maternal Uncle 38     Cerebral aneurysm Maternal Cousin      C.A.D. No family hx of      Diabetes No family hx of      Hypertension No family hx of      Coronary Artery Disease No family hx of      Cerebrovascular Disease No family hx of      Hyperlipidemia No family hx of      Colon Cancer No family hx of      Prostate Cancer No family hx of      Thyroid Disease No family hx of      No colon/panc/other GI CA, no other HNPCC-related Leia.  No IBD/celiac, no AI/liver disease.    PHYSICAL EXAMINATION:  Vitals reviewed, AFVSS   Wt   Wt Readings from Last 2 Encounters:   22 54.8 kg (120 lb 12.8 oz)   22 54.7 kg (120 lb 8 oz)       Gen: nontoxic, aaox3, cooperative, pleasant, not dyspneic/diaphoretic  HEENT: neck supple, normal op w/o ulcer/exudate, anicteric  Resp/CV unremarkable without significant finding  Abd: benign, soft, nondistended, intact bs, no hepatosplenomegaly, nontender, no peritoneal signs  Ext: no c/c/e  Skin: warm, perfused, no jaundice  Neuro: grossly intact    Pertinent outside studies:

## 2022-09-08 NOTE — LETTER
9/8/2022         RE: Celeste Arguello  [address] [facility] [address]  [place] [address]        Dear Colleague,    Thank you for referring your patient, Celeste Arguello, to the [facility] [place]. Please see a copy of my visit note below.        [facility]    CC/Referring Physician:  Miesha Flowers  Question for Consultation:  BRCA1 and family history of pancreatic cancer    Assessment and Plan:  Ms Arguello is a 36yo woman with BRCA1 and a second degree relative diagnosed with pancreatic cancer at asge 38. She is at an increased risk of pancreatic cancer approximate to be around 3-5% lifetime risk. Current guidelines suggest she should begin screening at age 51 as her affected relative is second degree. That said she has reasonable concerns about her developing pancreatic cancer and has vague abdominal pain. Therefore it is reasonable to obtain at least imaging at this juncture and decide on further surveillance and or screening based on these findings. We discussed accepted modes of screening, including both EUS and MRI/MRCP. Both are equally sensitive, though EUS requires anesthesia/sedation and is minimally invasive while allowing for sampling, and MRI/MRCP is noninvasive. Therefore, we will begin screening now with an MRI/MRCP and reflex an EUS for further evaluation and sampling when appropriate based on findings of the MRI. This will continue yearly for screening. We also reviewed the possibility of finding pancreatic cysts which themselves will deserve ongoing surveillance. She will attempt to stop vaping and her minimal alcohol use likely will have no impact on her clinical course.    She will begin average risk colon cancer screening at age 46.    RTC as clinical course dictates    It was a pleasure to participate in the care of this patient; please contact us with any further questions.  A total of at least 60  minutes was spent in evaluation of this patient today, >50% of which was discussion and counseling regarding the above delineated issues.      Juice Harris MD PhD FACG OMA POMPA  Associate Professor of Medicine, Surgery and Pediatrics  Interventional and Therapeutic Endoscopy  GI Service Line Medical Director    [facility]  Division of Gastroenterology and Hepatology  [facility] [address], [place] [address]    New Consultations  [phone]  Procedure Scheduling [phone]  Clinical Nurse Coordinator [phone]  Clinical Fax   [phone]  Administrative   [phone]  Administrative Fax  [phone]    -------------------------------------------------------------------------------------------------------------------  History of Presentation:  Ms Arguello is a 38yo woman with a BRCA1 mutation status post hysterectomy, bilateral salpingo oophorectomy and bilateral mastectomy who also has a single second degree relative diagnosed at age 38 (maternal uncle). She is a former smoker of tobacco, vapes every so often, and has no history of alcohol abuse, but drinks a glass of wine about 10x per year. Her BMI is 21. She has no history of pancreatitis though notes some nonspecific abdominal pain. Her bowel habits are regular.    Review of systems:  A twelve point review was performed and was otherwise negative beyond what is mentioned in the history above.    Pertinent past medical history:  Past Medical History:   Diagnosis Date     BRCA1 gene mutation positive 6/15/2018    BRCA1 c.181T>G (p.C61G) tested at [facility] 5/9/2018     Vertigo, benign paroxysmal, bilateral      Previous surgeries:  Past Surgical History:   Procedure Laterality Date     CYSTOSCOPY N/A 9/12/2018    Procedure: CYSTOSCOPY;;  Surgeon: Marybeth Grande MD;  [facility]     ENT SURGERY      tonsillectomy     HYSTERECTOMY TOTAL ABDOMINAL, BILATERAL SALPINGO-OOPHORECTOMY, NODE  DISSECTION, COMBINED Bilateral 2018    Procedure: COMBINED HYSTERECTOMY TOTAL ABDOMINAL, SALPINGO-OOPHORECTOMY, NODE DISSECTION;;  Surgeon: Marybeth Grande MD;  [facility]     INSERT TISSUE EXPANDER BREAST BILATERAL Bilateral 2019    Procedure: BILATERAL TISSUE EXPANDERS (GILDARDO);  Surgeon: Emily Gee MD;  [facility]     LAPAROSCOPIC HYSTERECTOMY TOTAL, BILATERAL SALPINGO-OOPHORECTOMY, NODE DISSECTION, COMBINED N/A 2018    Procedure: COMBINED LAPAROSCOPIC HYSTERECTOMY TOTAL, SALPINGO-OOPHORECTOMY, NODE DISSECTION;  Laparoscopic Removal Of Uterus, Cervix, Both Ovaries And Fallopian Tubes, Cystoscopy ;  Surgeon: Marybeth Grande MD;  [facility]     LAPAROSCOPY DIAGNOSTIC (GYN)  2006    endometriosis     MASTECTOMY SIMPLE BILATERAL Bilateral 2019    Procedure: BILATERAL PROPHYLACTIC MASTECTOMY (JUAN);  Surgeon: Roseann Boyd MD;  Location[facility] OR     urethral reimplant  age 6       Current mediations:  No current outpatient medications on file.     No current facility-administered medications for this visit.     Medications reviewed with patient today, see Medication List/Assessment for details.  No other NSAID/anticoagulation reported by patient.  No other OTC/herbal/supplements reported by patient.    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 Not Asked   Social History Narrative    How much exercise per week? One    How much calcium per day? Dairy products       How much caffeine per day? 1 cup coffee and 2 can of coke    How much vitamin D per day? None    Do you/your family wear seatbelts?  Yes    Do you/your family use safety helmets? yes    Do you/your family use sunscreen? Yes    Do you/your family keep firearms in the home? No    Do you/your family have a smoke detector(s)? Yes        Do you feel safe in your home? Yes    Has anyone ever touched you in an unwanted manner? No     Explain     SEE GLADYS [facility]     2014    Beronica Dumas [facility] 19         Social Determinants of Health     Financial Resource Strain: Not on file   Food Insecurity: Not on file   Transportation Needs: Not on file   Physical Activity: Not on file   Stress: Not on file   Social Connections: Not on file   Intimate Partner Violence: Not on file   Housing Stability: Not on file       Family history:  Family History   Problem Relation Age of Onset     Ovarian Cancer Mother 38         age 47     Alcohol/Drug Brother         active     Alcohol/Drug Brother 41        cause of death     Psychotic Disorder Brother         depression and ADHD     Hereditary Breast and Ovarian Cancer Syndrome Maternal Aunt 50        Breast cancer gene -- MEHRDAD/BSO and mastectomy     Breast Cancer Maternal Aunt 35        & MC age 36     Uterine Cancer Maternal Aunt      Pancreatic Cancer Maternal Uncle 38     Cerebral aneurysm Maternal Cousin      C.A.D. No family hx of      Diabetes No family hx of      Hypertension No family hx of      Coronary Artery Disease No family hx of      Cerebrovascular Disease No family hx of      Hyperlipidemia No family hx of      Colon Cancer No family hx of      Prostate Cancer No family hx of       Thyroid Disease No family hx of      No colon/panc/other GI CA, no other HNPCC-related Leia.  No IBD/celiac, no AI/liver disease.    PHYSICAL EXAMINATION:  Vitals reviewed, AFVSS   Wt   Wt Readings from Last 2 Encounters:   07/07/22 54.8 kg (120 lb 12.8 oz)   06/28/22 54.7 kg (120 lb 8 oz)      Gen: nontoxic, aaox3, cooperative, pleasant, not dyspneic/diaphoretic  HEENT: neck supple, normal op w/o ulcer/exudate, anicteric  Resp/CV unremarkable without significant finding  Abd: benign, soft, nondistended, intact bs, no hepatosplenomegaly, nontender, no peritoneal signs  Ext: no c/c/e  Skin: warm, perfused, no jaundice  Neuro: grossly intact    Pertinent outside studies:          Again, thank you for allowing me to participate in the care of your patient.        Sincerely,        Juice Harris MD

## 2022-09-19 ENCOUNTER — TELEPHONE (OUTPATIENT)
Dept: FAMILY MEDICINE | Facility: CLINIC | Age: 37
End: 2022-09-19

## 2022-09-19 ENCOUNTER — MYC MEDICAL ADVICE (OUTPATIENT)
Dept: FAMILY MEDICINE | Facility: CLINIC | Age: 37
End: 2022-09-19

## 2022-09-19 NOTE — TELEPHONE ENCOUNTER
Pt calling. States she has the BRCA 1 mutation. Has an increased chance for melanoma. Noticed a skin concern on her right calf over the weekend.  Is the size of a pin head. Is brown and has a red center, is raised and is irregular shaped. Is wondering if she should come in to have the area biopsied? Pt will also send a message via [employer] with a photo. Routing to provider to advise.    Kayley Marquez RN  [facility]

## 2022-09-28 ENCOUNTER — OFFICE VISIT (OUTPATIENT)
Dept: FAMILY MEDICINE | Facility: CLINIC | Age: 37
End: 2022-09-28
Payer: COMMERCIAL

## 2022-09-28 VITALS
OXYGEN SATURATION: 98 % | WEIGHT: 119 LBS | TEMPERATURE: 99 F | BODY MASS INDEX: 21.77 KG/M2 | HEART RATE: 71 BPM | SYSTOLIC BLOOD PRESSURE: 108 MMHG | DIASTOLIC BLOOD PRESSURE: 68 MMHG

## 2022-09-28 DIAGNOSIS — L98.9 CHANGING SKIN LESION: Primary | ICD-10-CM

## 2022-09-28 DIAGNOSIS — F43.22 ADJUSTMENT DISORDER WITH ANXIOUS MOOD: ICD-10-CM

## 2022-09-28 DIAGNOSIS — E89.40 SURGICAL MENOPAUSE: ICD-10-CM

## 2022-09-28 PROCEDURE — 88305 TISSUE EXAM BY PATHOLOGIST: CPT | Performed by: PATHOLOGY

## 2022-09-28 PROCEDURE — 99214 OFFICE O/P EST MOD 30 MIN: CPT | Performed by: NURSE PRACTITIONER

## 2022-09-28 RX ORDER — CITALOPRAM HYDROBROMIDE 40 MG/1
40 TABLET ORAL DAILY
Qty: 30 TABLET | Refills: 3 | Status: CANCELLED | OUTPATIENT
Start: 2022-09-28

## 2022-09-28 RX ORDER — CITALOPRAM HYDROBROMIDE 20 MG/1
TABLET ORAL
Qty: 90 TABLET | Refills: 3 | Status: SHIPPED | OUTPATIENT
Start: 2022-09-28 | End: 2024-06-26

## 2022-09-28 ASSESSMENT — ANXIETY QUESTIONNAIRES
1. FEELING NERVOUS, ANXIOUS, OR ON EDGE: SEVERAL DAYS
6. BECOMING EASILY ANNOYED OR IRRITABLE: SEVERAL DAYS
7. FEELING AFRAID AS IF SOMETHING AWFUL MIGHT HAPPEN: SEVERAL DAYS
3. WORRYING TOO MUCH ABOUT DIFFERENT THINGS: MORE THAN HALF THE DAYS
2. NOT BEING ABLE TO STOP OR CONTROL WORRYING: MORE THAN HALF THE DAYS
IF YOU CHECKED OFF ANY PROBLEMS ON THIS QUESTIONNAIRE, HOW DIFFICULT HAVE THESE PROBLEMS MADE IT FOR YOU TO DO YOUR WORK, TAKE CARE OF THINGS AT HOME, OR GET ALONG WITH OTHER PEOPLE: SOMEWHAT DIFFICULT

## 2022-09-28 ASSESSMENT — PATIENT HEALTH QUESTIONNAIRE - PHQ9
5. POOR APPETITE OR OVEREATING: NOT AT ALL
SUM OF ALL RESPONSES TO PHQ QUESTIONS 1-9: 9

## 2022-09-28 NOTE — PATIENT INSTRUCTIONS
Skin Care instructions:  Keep area dry for 24 hours. After this, it's ok to get wet and soapy in the shower.  If it starts to develop a scab-- apply some vaseline to the area.  You can keep covered if it is draining.  Watch for signs of infection-- fever, redness, or thick yellow drainage-- call if these develop.  I will call you with your test results if anything is abnormal. You will get a letter in the mail (or a [employer] message) with normal results.

## 2022-09-28 NOTE — PROGRESS NOTES
Assessment & Plan     Changing skin lesion  Probable Dermatofibroma, but patient prefers excision.  After explaining procedure of punch biopsy including risk of bleeding and infection and benefits of lesion removal and tissue diagnosis, the patient agreed to proceed with the procedure. Area was anesthetized with 1% lidocaine with epinephrine, a total of 1.5 mL.  Lesions cleaned with betadine x 3. Lesion biopsied with a 4mm punch. Pressure applied. Cautery not required. drysol not required. Area dressed with bandage. Wound care given to the patient. Patient tolerated the procedure well, ebl minimial, no complications.     - Surgical Pathology Exam    Adjustment disorder with anxious mood  Uncontrolled, restart Citalopram. Follow-up if not improving within 2 months. Continue mental health therapy.   - citalopram (CELEXA) 20 MG tablet; Take 0.5 tablets (10 mg) by mouth daily for 14 days, THEN 1 tablet (20 mg) daily.    Surgical menopause  Reviewed Oncology notes. Patient aware of bone and heart health risks associated with early menopause, ultimately, she agrees to restart premarin due to this.  - estrogen conj (PREMARIN) 0.3 MG tablet; Take 1 tablet (0.3 mg) by mouth daily    Ordering of each unique test  Prescription drug management         See Patient Instructions    Return in about 1 year (around 2023) for Physical.    [facility] is a 37 year old accompanied by her self, presenting for the following health issues:  Punch Biopsy and  Follow Up    History of Present Illness       Mental Health Follow-up:  Patient presents to follow-up on Depression & Anxiety.Status since last visit:: struggles with her appetite, sleep.  : as above.  Patient's anxiety since last visit has been:  Worse  : sleep.  Significant life event:: brother  1 year ago and a genetic mutation.  Patient is feeling anxious or having panic attacks.  Patient has  no concerns about alcohol or drug use.    Reason for visit:  I have a new marking/mole on my right calf that appears abnormal  Symptom onset:  1-2 weeks ago  Symptoms include:  It itches when it first appeared. No other symptoms beyond that.  Symptom intensity:  Mild  Symptom progression:  Improving  Had these symptoms before:  No  What makes it worse:  No  What makes it better:  No    She eats 2-3 servings of fruits and vegetables daily.She consumes 2 sweetened beverage(s) daily.She exercises with enough effort to increase her heart rate 10 to 19 minutes per day.  She exercises with enough effort to increase her heart rate 5 days per week.   She is taking medications regularly.       Patient notes anxious, irritable mood ongoing for 1 year since brother's death and worsening. Has good support and sees a therapist. Previously took Citalopram and would like to restart.    Mole on leg for a few weeks- concerned due to known BRCA gene mutation and higher risk for cancers, including skin cancer.    S/p prophylactic bilateral mastectomy and MEHRDAD. Stopped Premarin a few months ago after talking with Oncology provider.    Review of Systems   Constitutional, HEENT, cardiovascular, pulmonary, gi and gu systems are negative, except as otherwise noted.      Objective    /68 (BP Location: Left arm, Patient Position: Chair, Cuff Size: Adult Regular)   Pulse 71   Temp 99  F (37.2  C) (Oral)   Wt 54 kg (119 lb)   LMP 12/17/2016   SpO2 98%   BMI 21.77 kg/m    Body mass index is 21.77 kg/m .  Physical Exam   GENERAL: healthy, alert and no distress  SKIN: Right calf with 3 mm erythematous papule with excoriated vs keratotic surface  PSYCH: mentation appears normal, affect normal/bright    No results found for this or any previous visit (from the past 24 hour(s)).

## 2022-09-30 ENCOUNTER — HOSPITAL ENCOUNTER (OUTPATIENT)
Dept: MRI IMAGING | Facility: CLINIC | Age: 37
Discharge: HOME | End: 2022-09-30
Attending: INTERNAL MEDICINE | Admitting: INTERNAL MEDICINE
Payer: COMMERCIAL

## 2022-09-30 DIAGNOSIS — Z80.0 FAMILY HISTORY OF PANCREATIC CANCER: ICD-10-CM

## 2022-09-30 DIAGNOSIS — Z15.09 BRCA1 POSITIVE: ICD-10-CM

## 2022-09-30 DIAGNOSIS — Z15.01 BRCA1 POSITIVE: ICD-10-CM

## 2022-09-30 LAB
PATH REPORT.COMMENTS IMP SPEC: NORMAL
PATH REPORT.COMMENTS IMP SPEC: NORMAL
PATH REPORT.FINAL DX SPEC: NORMAL
PATH REPORT.GROSS SPEC: NORMAL
PATH REPORT.MICROSCOPIC SPEC OTHER STN: NORMAL
PATH REPORT.RELEVANT HX SPEC: NORMAL
PHOTO IMAGE: NORMAL

## 2022-09-30 PROCEDURE — A9585 GADOBUTROL INJECTION: HCPCS | Performed by: INTERNAL MEDICINE

## 2022-09-30 PROCEDURE — 74183 MRI ABD W/O CNTR FLWD CNTR: CPT

## 2022-09-30 PROCEDURE — 74183 MRI ABD W/O CNTR FLWD CNTR: CPT | Mod: 26 | Performed by: RADIOLOGY

## 2022-09-30 PROCEDURE — 255N000002 HC RX 255 OP 636: Performed by: INTERNAL MEDICINE

## 2022-09-30 RX ORDER — GADOBUTROL 604.72 MG/ML
7.5 INJECTION INTRAVENOUS ONCE
Status: COMPLETED | OUTPATIENT
Start: 2022-09-30 | End: 2022-09-30

## 2022-09-30 RX ADMIN — GADOBUTROL 5.5 ML: 604.72 INJECTION INTRAVENOUS at 08:42

## 2022-10-03 DIAGNOSIS — Z15.09 BRCA1 POSITIVE: Primary | ICD-10-CM

## 2022-10-03 DIAGNOSIS — Z80.0 FAMILY HISTORY OF PANCREATIC CANCER: ICD-10-CM

## 2022-10-03 DIAGNOSIS — Z15.01 BRCA1 POSITIVE: Primary | ICD-10-CM

## 2022-11-29 ENCOUNTER — OFFICE VISIT (OUTPATIENT)
Dept: DERMATOLOGY | Facility: CLINIC | Age: 37
End: 2022-11-29
Payer: COMMERCIAL

## 2022-11-29 DIAGNOSIS — D22.9 ENLARGED SKIN MOLE: ICD-10-CM

## 2022-11-29 DIAGNOSIS — Z15.09 BRCA1 POSITIVE: ICD-10-CM

## 2022-11-29 DIAGNOSIS — Z15.01 BRCA1 POSITIVE: ICD-10-CM

## 2022-11-29 DIAGNOSIS — D49.2 NEOPLASM OF SKIN: ICD-10-CM

## 2022-11-29 PROCEDURE — 11102 TANGNTL BX SKIN SINGLE LES: CPT | Mod: GC | Performed by: DERMATOLOGY

## 2022-11-29 PROCEDURE — 99213 OFFICE O/P EST LOW 20 MIN: CPT | Mod: 25 | Performed by: DERMATOLOGY

## 2022-11-29 PROCEDURE — 88305 TISSUE EXAM BY PATHOLOGIST: CPT | Mod: 26 | Performed by: PATHOLOGY

## 2022-11-29 PROCEDURE — 88305 TISSUE EXAM BY PATHOLOGIST: CPT | Mod: TC | Performed by: DERMATOLOGY

## 2022-11-29 ASSESSMENT — PAIN SCALES - GENERAL: PAINLEVEL: NO PAIN (0)

## 2022-11-29 NOTE — LETTER
11/29/2022       RE: Celeste Arguello  [address]  [place] [address]     Dear Colleague,    Thank you for referring your patient, Celeste Arguello, to the [facility] [place] at [facility]. Please see a copy of my visit note below.    Former[facility] Dermatology Note  Encounter Date: Nov 29, 2022  Office Visit     Dermatology Problem List:  1. Neoplasm of uncertain behavior  DDX: dysplastic nevus, rule out melanoma  2. BRCA 1(+)  3. Lesion to monitor, L breast, photo taken 11/29/22    ____________________________________________    Assessment & Plan:     # Neoplasm of uncertain behavior, right abdomen  DDX: dysplastic nevus, rule out melanoma     - Shave biopsy performed today (see procedure note below)     # Numerous benign nevi  - She does have numerous irregular nevi, but all have symmetric pigment pattern on dermoscopy  - ABCDEs: Counseled ABCDEs of melanoma: Asymmetry, Border (irregularity), Color (not uniform, changes in color), Diameter (greater than 6 mm which is about the size of a pencil eraser), and Evolving (any changes in preexisting moles).    #Lesion to monitor, L breast    She does have a 6 mm brown macule with irregular, spokewheel like borders on the left breast. Unchanged for 10-20 years.     -Photo taken today for annual monitoring    Procedures Performed:   - Shave biopsy procedure note, location(s): right abdomen. After discussion of benefits and risks including but not limited to bleeding, infection, scar, incomplete removal, recurrence, and non-diagnostic biopsy, written consent and photographs were obtained. The area was cleaned with isopropyl alcohol. 0.5mL of 1% lidocaine with epinephrine was injected to obtain adequate anesthesia of lesion(s). Shave biopsy at site(s) performed. Hemostasis was achieved with aluminium chloride. Petrolatum ointment and a sterile dressing were applied.  The patient tolerated the procedure and no complications were noted. The patient was provided with verbal and written post care instructions.       Follow-up: one year for FBSE    Staff and Resident:     Amanda Munoz MD    I was present for key portions of the procedure. Jeninfer Stover MD  I, Jennifer Stover MD, saw this patient with the resident and agree with the resident s findings and plan of care as documented in the resident s note.    ____________________________________________    CC: Skin Check (Mole on left chest to be examined.)    HPI:  Ms. Celeste Arguello is a(n) 37 year old female who presents today as a new patient for FBSE. She has had a mole on her left breast for as long as she can remember-it has not changed. This was evaluated by Dr. Spencer in 2018. Photo was taken at that time. She recently had a lesion removed on her right lower leg, which came back as an inflamed SK.     Patient is otherwise feeling well, without additional skin concerns.    Labs Reviewed:  N/A    Physical Exam:  Vitals: LMP 12/17/2016      SKIN: Full skin, which includes the head/face, both arms, chest, back, abdomen,both legs, genitalia and/or groin buttocks, digits and/or nails, was examined.  - On the right abdomen, there is a small dark brown macule with thickening of the pigment pattern centrally, and irregular reticular pigment pattern peripherally  - On the left breast, there is a 6 mm medium brown macule with regular pigment network and irregular spokewheel like border, lesion is symmetric  - Multiple brown pigmented macules, some with slightly irregular border, all with regular pigment pattern, are identified on the stomach, back, anterior legs, buttocks, and inner thighs  - No other lesions of concern on areas examined.     Medications:  Current Outpatient Medications   Medication     citalopram (CELEXA) 20 MG tablet     estrogen conj (PREMARIN) 0.3 MG tablet     No current facility-administered  medications for this visit.      Past Medical History:   Patient Active Problem List   Diagnosis     Mother  of ovarian cancer, age 47     Vitamin d deficiency 23     Female genital symptoms     Encounter for routine gynecological examination     Endometriosis     BRCA1 gene mutation positive     Pelvic pain in female     BRCA gene mutation positive     Acquired absence of both breasts and nipples     Surgical menopause     Adjustment disorder with anxious mood     Past Medical History:   Diagnosis Date     BRCA1 gene mutation positive 6/15/2018    BRCA1 c.181T>G (p.C61G) tested at [facility] 2018     Vertigo, benign paroxysmal, bilateral              Again, thank you for allowing me to participate in the care of your patient.      Sincerely,    Jennifer Stover MD

## 2022-11-29 NOTE — NURSING NOTE
Lidocaine-epinephrine 1-1:125640 % injection   1.5 mL once for one use, starting 11/29/2022 ending 11/29/2022,  2mL disp, R-0, injection  Injected by Dr. Munoz

## 2022-11-29 NOTE — PATIENT INSTRUCTIONS
Wound Care After a Biopsy    What is a skin biopsy?  A skin biopsy allows the doctor to examine a very small piece of tissue under the microscope to determine the diagnosis and the best treatment for the skin condition. A local anesthetic (numbing medicine)  is injected with a very small needle into the skin area to be tested. A small piece of skin is taken from the area. Sometimes a suture (stitch) is used.     What are the risks of a skin biopsy?  I will experience scar, bleeding, swelling, pain, crusting and redness. I may experience incomplete removal or recurrence. Risks of this procedure are excessive bleeding, bruising, infection, nerve damage, numbness, thick (hypertrophic or keloidal) scar and non-diagnostic biopsy.    How should I care for my wound for the first 24 hours?  Keep the wound dry and covered for 24 hours  If it bleeds, hold direct pressure on the area for 15 minutes. If bleeding does not stop then go to the emergency room  Avoid strenuous exercise the first 1-2 days or as your doctor instructs you    How should I care for the wound after 24 hours?  After 24 hours, remove the bandage  You may bathe or shower as normal  If you had a scalp biopsy, you can shampoo as usual and can use shower water to clean the biopsy site daily  Clean the wound twice a day with gentle soap and water  Do not scrub, be gentle  Apply white petroleum/Vaseline after cleaning the wound with a cotton swab or a clean finger, and keep the site covered with a Bandaid /bandage. Bandages are not necessary with a scalp biopsy  If you are unable to cover the site with a Bandaid /bandage, re-apply ointment 2-3 times a day to keep the site moist. Moisture will help with healing  Avoid strenuous activity for first 1-2 days  Avoid lakes, rivers, pools, and oceans until the stitches are removed or the site is healed    How do I clean my wound?  Wash hands thoroughly with soap or use hand  before all wound care  Clean the  wound with gentle soap and water  Apply white petroleum/Vaseline  to wound after it is clean  Replace the Bandaid /bandage to keep the wound covered for the first few days or as instructed by your doctor  If you had a scalp biopsy, warm shower water to the area on a daily basis should suffice    What should I use to clean my wound?   Cotton-tipped applicators (Qtips )  White petroleum jelly (Vaseline ). Use a clean new container and use Q-tips to apply.  Bandaids   as needed  Gentle soap     How should I care for my wound long term?  Do not get your wound dirty  Keep up with wound care for one week or until the area is healed.  A small scab will form and fall off by itself when the area is completely healed. The area will be red and will become pink in color as it heals. Sun protection is very important for how your scar will turn out. Sunscreen with an SPF 30 or greater is recommended once the area is healed.  If you have stitches, stitches need to be removed in 14 days. You may return to our clinic for this or you may have it done locally at your doctor s office.  You should have some soreness but it should be mild and slowly go away over several days. Talk to your doctor about using tylenol for pain,    When should I call my doctor?  If you have increased:   Pain or swelling  Pus or drainage (clear or slightly yellow drainage is ok)  Temperature over 100F  Spreading redness or warmth around wound    When will I hear about my results?  The biopsy results can take 2 weeks to come back.  Your results will automatically release to [facility] before your provider has even reviewed them.  The clinic will call you with the results, send you a Kleek message, or have you schedule a follow-up clinic or phone time to discuss the results.  Contact our clinics if you do not hear from us in 2 weeks.    Who should I call with questions?  [facility]: [phone]  [facility]  [place]: [phone]  For urgent needs outside of business hours call the [facility] at [phone] and ask for the dermatology resident on call

## 2022-11-29 NOTE — NURSING NOTE
Dermatology Rooming Note    Celeste Arguello's goals for this visit include:   Chief Complaint   Patient presents with     Skin Check     Mole on left chest to be examined.     Magen Benitez, EMT-B

## 2022-11-30 ENCOUNTER — TELEPHONE (OUTPATIENT)
Dept: FAMILY MEDICINE | Facility: CLINIC | Age: 37
End: 2022-11-30

## 2022-11-30 DIAGNOSIS — E89.40 SURGICAL MENOPAUSE: Primary | ICD-10-CM

## 2022-11-30 NOTE — TELEPHONE ENCOUNTER
estrogen conj (PREMARIN) 0.3 MG tablet 90 tablet 3 9/28/2022     PA is needed for medication. Please go to [email or web address]     KEY:[id number]    OR     Alternatives include:    Alendronate Sodium    Estradiol    Fluoxetine HCL    Venlafaxine HCL ER

## 2022-12-01 LAB
PATH REPORT.COMMENTS IMP SPEC: NORMAL
PATH REPORT.COMMENTS IMP SPEC: NORMAL
PATH REPORT.FINAL DX SPEC: NORMAL
PATH REPORT.GROSS SPEC: NORMAL
PATH REPORT.MICROSCOPIC SPEC OTHER STN: NORMAL
PATH REPORT.RELEVANT HX SPEC: NORMAL

## 2022-12-01 NOTE — TELEPHONE ENCOUNTER
Central Prior Authorization Team   Phone: [phone]      PA Initiation    Medication: estrogen conj (PREMARIN) 0.3 MG tablet - INITIATED  Insurance Company: [employer] [place] - Phone [phone] Fax [phone]  Pharmacy Filling the Rx: [id number] - [place], [place] - [address]  Filling Pharmacy Phone: [phone]  Filling Pharmacy Fax:    Start Date: 12/1/2022

## 2022-12-05 RX ORDER — ESTRADIOL 0.5 MG/1
0.5 TABLET ORAL DAILY
Qty: 90 TABLET | Refills: 3 | Status: SHIPPED | OUTPATIENT
Start: 2022-12-05

## 2022-12-05 NOTE — TELEPHONE ENCOUNTER
PRIOR AUTHORIZATION DENIED    Medication: estrogen conj (PREMARIN) 0.3 MG tablet - PA DENIED    Denial Date: 12/3/2022    Denial Rational: MUST TRY/FAIL THREE FORMULARY ALTERNATIVES - ESTRADIOL TABS,  ESTRADIOL TRANSDERMAL PATCHES, GENERICS FOR ACTIVELLA      Appeal Information: IF PROVIDER WOULD LIKE TO APPEAL THIS DECISION PLEASE PROVIDE PA TEAM WITH LETTER OF MEDICAL NECESSITY

## 2022-12-05 NOTE — NURSING NOTE
"Chief Complaint   Patient presents with     New Patient       Vitals:    09/08/22 0805   BP: 118/85   Pulse: 69   SpO2: 99%   Weight: 54 kg (119 lb 1.6 oz)   Height: 1.575 m (5' 2\")       Body mass index is 21.78 kg/m .    Julee Littlejohn CMA    " Xeljanz Counseling: I discussed with the patient the risks of Xeljanz therapy including increased risk of infection, liver issues, headache, diarrhea, or cold symptoms. Live vaccines should be avoided. They were instructed to call if they have any problems.

## 2022-12-05 NOTE — TELEPHONE ENCOUNTER
Ok for formulary alternative   Signed Prescriptions:                        Disp   Refills    estradiol (ESTRACE) 0.5 MG tablet          90 tab*3        Sig: Take 1 tablet (0.5 mg) by mouth daily  Authorizing Provider: POLY PERES

## 2022-12-19 ENCOUNTER — TELEPHONE (OUTPATIENT)
Dept: FAMILY MEDICINE | Facility: CLINIC | Age: 37
End: 2022-12-19

## 2023-01-07 ENCOUNTER — HEALTH MAINTENANCE LETTER (OUTPATIENT)
Age: 38
End: 2023-01-07

## 2023-04-22 ENCOUNTER — HEALTH MAINTENANCE LETTER (OUTPATIENT)
Age: 38
End: 2023-04-22

## 2023-06-27 ENCOUNTER — ONCOLOGY VISIT (OUTPATIENT)
Dept: ONCOLOGY | Facility: CLINIC | Age: 38
End: 2023-06-27
Attending: CLINICAL NURSE SPECIALIST
Payer: COMMERCIAL

## 2023-06-27 VITALS
SYSTOLIC BLOOD PRESSURE: 107 MMHG | HEART RATE: 65 BPM | RESPIRATION RATE: 12 BRPM | WEIGHT: 116 LBS | OXYGEN SATURATION: 98 % | BODY MASS INDEX: 21.22 KG/M2 | DIASTOLIC BLOOD PRESSURE: 73 MMHG

## 2023-06-27 DIAGNOSIS — Z15.09 BRCA1 POSITIVE: Primary | ICD-10-CM

## 2023-06-27 DIAGNOSIS — Z15.01 BRCA1 POSITIVE: Primary | ICD-10-CM

## 2023-06-27 PROCEDURE — 99213 OFFICE O/P EST LOW 20 MIN: CPT | Performed by: CLINICAL NURSE SPECIALIST

## 2023-06-27 PROCEDURE — 99212 OFFICE O/P EST SF 10 MIN: CPT | Performed by: CLINICAL NURSE SPECIALIST

## 2023-06-27 ASSESSMENT — PAIN SCALES - GENERAL: PAINLEVEL: NO PAIN (0)

## 2023-06-27 NOTE — NURSING NOTE
"Oncology Rooming Note    June 27, 2023 1:51 PM   Celeste Arguello is a 38 year old female who presents for:    Chief Complaint   Patient presents with     Oncology Clinic Visit     1 year follow up     Initial Vitals: /73   Pulse 65   Resp 12   Wt 52.6 kg (116 lb)   LMP 12/17/2016   SpO2 98%   BMI 21.22 kg/m   Estimated body mass index is 21.22 kg/m  as calculated from the following:    Height as of 9/8/22: 1.575 m (5' 2\").    Weight as of this encounter: 52.6 kg (116 lb). Body surface area is 1.52 meters squared.  No Pain (0) Comment: Data Unavailable   Patient's last menstrual period was 12/17/2016.  Allergies reviewed: Yes  Medications reviewed: Yes    Medications: Medication refills not needed today.  Pharmacy name entered into [facility]:    [facility] [place], [place] - [address] [id number] - [address] [address] [place], [place] - [address] [place] [address] PHARMACY [facility] - [address]    Clinical concerns:  none      Simi Moeller            "

## 2023-06-27 NOTE — PROGRESS NOTES
Oncology Risk Management Consultation:  Date on this visit: 2023    Celeste Arguello   requires high risk screening and surveillance to reduce her risk of cancer secondary to Hereditary Breast and Ovarian Cancer Syndrome, linked to a BRCA1 mutation.  She is considered to be at high risk for hereditary breast and ovarian cancer. She has mitigated her risk by having a total laparoscopic hysterectomy and bilateral salpingo oophorectomy in 2018 as well as a risk reducing bilateral mastectomy in 2019.      Primary Physician: ENRIQUE Renae-CNP     History Of Present Illness:  Ms. Arguello is a very pleasant 38 year old female who presents with a family history of breast and ovarian cancer and a confirmed BRCA1 mutation.     2018 - NIKOS, [place]. RRM with Dr. Boyd and Gerard      Genetic Testin2018 - POSITIVE for a BRCA1 mutation. Specifically her mutation is called c.181T>G (p.C61G) identified using [employer] Next testing from [employer]. This testing was done because of her family history of ovarian and breast cancer.  Of note, Celeste tested negative for mutations in the following genes by sequencing and deletion/duplication analysis: ALEM, BARD1, BRCA1, BRCA2, BRIP1, CDH1, CHEK2, DICER1, EPCAM, MLH1, MRE11A, MSH2, MSH6, MUTYH, NBN, NF1, PALB2, PMS2, PTEN, RAD50, RAD51C, RAD51D, SMARCA4, STK11, and TP53 genes.     2018 - ANGIE (Dr. MCKENZIE SANDERSON) PATHOLOGY:   A: IUD   B: Uterus, cervix, bilateral fallopian tubes and ovaries     FINAL DIAGNOSIS:   A. IUD, REMOVAL:   - Intrauterine device identified (gross only)     B. UTERUS, CERVIX, BILATERAL FALLOPIAN TUBES AND OVARIES, TOTAL   LAPAROSCOPIC HYSTERECTOMY AND BILATERAL   SALPINGO-OOPHORECTOMY:   - Endometrium with inactive glands and decidualized stroma consistent with    exogenous progesterone effect   - Myometrium with no significant histologic findings   - Cervix with no significant histologic findings   - Benign  unremarkable right ovary   - Left ovary with corpus luteum cyst and adhesions   - Right fallopian tube with paratubal cysts and Walthard's rests   - Left fallopian tube with no significant histologic findings      Hx of estradiol PO 0.3 mg daily x 2 years post risk reducing salpingo oophorectomy     4/8/2019- FINAL DIAGNOSIS:   A: Breast, left, prophylactic mastectomy: Negative for malignancy.     B: Breast, right, prophylactic mastectomy: Negative for malignancy.     8/27/2018- Dermatology check (Dr. Fracisco Spencer) - all benign findings.    9/28/2022- Dermatology:       Skin of right lower leg lesion, excision-  -Inflamed hypertrophic seborrheic keratosis.  Negative for malignancy.       11/8/2022- Dermatology check (Dr. Jennifer Stover),    # Neoplasm of uncertain behavior, right abdomen  DDX: dysplastic nevus, rule out melanoma   - Shave biopsy:   # Numerous benign nevi  - She does have numerous irregular nevi, but all have symmetric pigment pattern on dermoscopy  - ABCDEs: Counseled ABCDEs of melanoma: Asymmetry, Border (irregularity), Color (not uniform, changes in color), Diameter (greater than 6 mm which is about the size of a pencil eraser), and Evolving (any changes in preexisting moles).  #Lesion to monitor, L breast- 6 mm brown macule with irregular, spokewheel like borders on the left breast. Unchanged for 10-20 years.     9/30/2022- MRCP:  IMPRESSION: Unremarkable appearance of the pancreas and biliary tract.  No acute or suspicious findings on today's exam.      Past Medical/Surgical History:  Past Medical History:   Diagnosis Date     BRCA1 gene mutation positive 6/15/2018    BRCA1 c.181T>G (p.C61G) tested at [facility] 5/9/2018     Vertigo, benign paroxysmal, bilateral      Past Surgical History:   Procedure Laterality Date     CYSTOSCOPY N/A 9/12/2018    Procedure: CYSTOSCOPY;;  Surgeon: Marybeth Grande MD;  Location: [facility] OR     ENT SURGERY      tonsillectomy     HYSTERECTOMY TOTAL ABDOMINAL,  BILATERAL SALPINGO-OOPHORECTOMY, NODE DISSECTION, COMBINED Bilateral 2018    Procedure: COMBINED HYSTERECTOMY TOTAL ABDOMINAL, SALPINGO-OOPHORECTOMY, NODE DISSECTION;;  Surgeon: Marybeth Grande MD;  Location: [facility] OR     INSERT TISSUE EXPANDER BREAST BILATERAL Bilateral 2019    Procedure: BILATERAL TISSUE EXPANDERS (GILDARDO);  Surgeon: Emily Gee MD;  Location: [facility] OR     LAPAROSCOPIC HYSTERECTOMY TOTAL, BILATERAL SALPINGO-OOPHORECTOMY, NODE DISSECTION, COMBINED N/A 2018    Procedure: COMBINED LAPAROSCOPIC HYSTERECTOMY TOTAL, SALPINGO-OOPHORECTOMY, NODE DISSECTION;  Laparoscopic Removal Of Uterus, Cervix, Both Ovaries And Fallopian Tubes, Cystoscopy ;  Surgeon: Marybeth Grande MD;  Location: [facility] OR     LAPAROSCOPY DIAGNOSTIC (GYN)  2006    endometriosis     MASTECTOMY SIMPLE BILATERAL Bilateral 2019    Procedure: BILATERAL PROPHYLACTIC MASTECTOMY (JUAN);  Surgeon: Roseann Boyd MD;  Location: [facility] OR     urethral reimplant  age 6       Allergies:  Allergies as of 2023 - Reviewed 2023   Allergen Reaction Noted     Chlorhexidine gluconate Rash 2018       Current Medications:  Current Outpatient Medications   Medication Sig Dispense Refill     citalopram (CELEXA) 20 MG tablet Take 0.5 tablets (10 mg) by mouth daily for 14 days, THEN 1 tablet (20 mg) daily. 90 tablet 3     estradiol (ESTRACE) 0.5 MG tablet Take 1 tablet (0.5 mg) by mouth daily (Patient not taking: Reported on 2023) 90 tablet 3        Family History:  Family History   Problem Relation Age of Onset     Ovarian Cancer Mother 38         age 47     Alcohol/Drug Brother         active     Alcohol/Drug Brother 41        cause of death     Psychotic Disorder Brother         depression and ADHD     Hereditary Breast and Ovarian Cancer Syndrome Maternal Aunt 50        Breast cancer gene -- MEHRDAD/BSO and mastectomy     Breast Cancer Maternal Aunt 35        & MC age 36     Uterine Cancer  Maternal Aunt      Pancreatic Cancer Maternal Uncle 38     Cerebral aneurysm Maternal Cousin      C.A.D. 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 Not Asked   Social History Narrative    How much exercise per week? One    How much calcium per day? Dairy products       How much caffeine per day? 1 cup coffee and 2 can of coke    How much vitamin D per day? None    Do you/your family wear seatbelts?  Yes    Do you/your family use safety helmets? yes    Do you/your family use sunscreen? Yes    Do you/your family keep firearms in the home? No    Do you/your family have a smoke detector(s)? Yes        Do you feel safe in your home? Yes    Has anyone ever touched you in an unwanted manner? No     Explain     SEE GLADYS [facility]     2014    Beronica Dumas [facility] 19         Social Determinants of Health     Financial Resource Strain: Not on file    Food Insecurity: Not on file   Transportation Needs: Not on file   Physical Activity: Not on file   Stress: Not on file   Social Connections: Not on file   Intimate Partner Violence: Not on file   Housing Stability: Not on file       Physical Exam:  /73   Pulse 65   Resp 12   Wt 52.6 kg (116 lb)   LMP 12/17/2016   SpO2 98%   BMI 21.22 kg/m    GENERAL: Healthy, alert and no distress  EYES: Eyes grossly normal to inspection.  No discharge or erythema, or obvious scleral/conjunctival abnormalities.  RESP: No audible wheeze, cough, or visible cyanosis.  No visible retractions or increased work of breathing.    SKIN: Visible skin clear. No significant rash, abnormal pigmentation or lesions.  NEURO: Cranial nerves grossly intact.  Mentation and speech appropriate for age.  PSYCH: Mentation appears normal, affect normal/bright, judgement and insight intact, normal speech and appearance well-groomed.    Laboratory/Imaging Studies  No results found for any visits on 06/27/23.    ASSESSMENT  We reviewed her history and discussed that her risks for breast and ovarian cancer have been mitigated via surgery.  We discussed her continuing to monitor for any vision changes with her ophthalmologist and continued periodic skin checks. She has stopped taking her estradiol and feels that her mood has been more depressed. She has stopped due to the cost of the medication and we reviewed ways to save money using [employer]. She notes that she will look into this and refill her prescription.    She is well aware of the screening plan below.  At this point, she will also continue to have annual pancreatic screening using MRCP with Dr. Harris.  Individualized Surveillance Plan for women  Hereditary Breast and/or Ovarian Cancer Syndrome   Per NCCN Guidelines Version 2.2023   Recommended screening Test or procedure Last done Next Scheduled    Breast self awareness starting at age 18.    Breast cancer risk >60% Women should be  familiar with their breasts and promptly report changes to their care provider. Periodic, consistent self exam may facilitate breast self awareness. Premenopausal women may find self exams to be most informative when performed at the end of menses.   6/27/2023 June 2024   Breast screening, starting at age 25 Clinical breast exams every 6 -12 months NA- mastectomy Educated on signs/symptoms of issues with scar tissue and what to watch for   Breast screening   Age 25-29 Annual breast MRI screening with contrast (or mammogram if MRI is unavailable) or individualized based on family history if a breast cancer diagnosis before age 30 is present.       Breast MRI is performed preferably on day 7-15 of menstrual cycle for premenopausal women.     See below     See below   Breast screening   Age >30-75 years     Annual mammogram (consider tomosynthesis mammogram) and annual screening MRI.     Breast MRI is performed preferably on day 7-15 of menstrual cycle for premenopausal women.    Age>75 years, management should be considered on an individual basis. NA- Prophylactic mastectomy   NA   Ovarian cancer screening, starting at age 30-35    Absolute risk for epithelial ovarian cancer -39-58% for BRCA1+ carriers and 13-29% for BRCA2+ carriers   Consider transvaginal ultrasound and  tests.     NA, THBSO     NA   Pancreatic Cancer Screening beginning at age 50 or 10 years prior to the earliest pancreatic cancer on the BRCA-side of the family  In families with exocrine pancreatic cancer in a first or second degree relative    Risk is <5% for BRCA1+ carriers    5-10% for BRCA2+ carriers     Consider Annual MRI/MRCP and/or Endoscopic Ultrasound 9/30/2022- MRCP:  IMPRESSION: Unremarkable appearance of the pancreas and biliary tract. MRCP in September with Dr. Harris   Recommendation: risk-reducing salpingo-oophorectomy(RRSO), typically between 35 and 40 years old and  upon completion of child bearing.     Because ovarian  cancer onset in patients with BRCA2 mutations is on average of 8-10 years later than in patients with BRCA1 mutations, it is reasonable to delay RRSO until 40-45 years in patients with BRCA2 mutations  unless age at diagnosis in the family warrants earlier age for consideration for prophylactic surgery.    Limited data suggest that there may be a slightly increased risk of serous uterine cancer among women with BRCA1+ pathogenic variants. The provider and the patient should discuss the risks and benefits of a concurrent hysterectomy at the time of RRSO for women with a BRCA1+ pathogenic variant /like pathogenic variant prior to surgery.     Salpingectomy alone is not the standard of care for risk reduction although clinical trials are ongoing.     Discuss option of risk-reducing mastectomy.    Consider risks and benefits of risk reducing agents, such as tamoxifen and raloxifene for breast and ovarian cancer.    Consider a full body skin and eye exam for melanoma screening for both BRCA1+ and BRCA2+.     NOTE: Women with BRCA mutation who are treated for breast cancer should have screening of the remaining breast tissue with annual mammography and breast MRI.       I spent a total of 15 minutes on the day of the visit. Please see the note for further information on patient assessment and treatment.    Miesha Flowers, ENRIQUE-CNS, OCN, AGN-BC  Clinical Nurse Specialist  Cancer Risk Management Program  [facility]    CC: Juice Harris MD

## 2023-06-27 NOTE — PATIENT INSTRUCTIONS
MRCP with Dr. Harris in late September or early October.  Continue annual screening with Dr. Stover

## 2023-06-27 NOTE — LETTER
2023         RE: Celeste Arguello  [address]  [place] [address]        Dear Colleague,    Thank you for referring your patient, Celeste Arguello, to the [facility]. Please see a copy of my visit note below.    Oncology Risk Management Consultation:  Date on this visit: 2023    Celeste Arguello   requires high risk screening and surveillance to reduce her risk of cancer secondary to Hereditary Breast and Ovarian Cancer Syndrome, linked to a BRCA1 mutation.  She is considered to be at high risk for hereditary breast and ovarian cancer. She has mitigated her risk by having a total laparoscopic hysterectomy and bilateral salpingo oophorectomy in 2018 as well as a risk reducing bilateral mastectomy in 2019.      Primary Physician: ENRIQUE Renae-CNP     History Of Present Illness:  Ms. Arguello is a very pleasant 38 year old female who presents with a family history of breast and ovarian cancer and a confirmed BRCA1 mutation.     2018 - NIKOS, rosa. RRM with Dr. Boyd and Gerard      Genetic Testin2018 - POSITIVE for a BRCA1 mutation. Specifically her mutation is called c.181T>G (p.C61G) identified using [employer] Next testing from [employer]. This testing was done because of her family history of ovarian and breast cancer.  Of note, Celeste tested negative for mutations in the following genes by sequencing and deletion/duplication analysis: ALEM, BARD1, BRCA1, BRCA2, BRIP1, CDH1, CHEK2, DICER1, EPCAM, MLH1, MRE11A, MSH2, MSH6, MUTYH, NBN, NF1, PALB2, PMS2, PTEN, RAD50, RAD51C, RAD51D, SMARCA4, STK11, and TP53 genes.     2018 - ANGIE (Dr. MCKENZIE SANDERSON) PATHOLOGY:   A: IUD   B: Uterus, cervix, bilateral fallopian tubes and ovaries     FINAL DIAGNOSIS:   A. IUD, REMOVAL:   - Intrauterine device identified (gross only)     B. UTERUS, CERVIX, BILATERAL FALLOPIAN TUBES AND OVARIES, TOTAL   LAPAROSCOPIC HYSTERECTOMY  AND BILATERAL   SALPINGO-OOPHORECTOMY:   - Endometrium with inactive glands and decidualized stroma consistent with    exogenous progesterone effect   - Myometrium with no significant histologic findings   - Cervix with no significant histologic findings   - Benign unremarkable right ovary   - Left ovary with corpus luteum cyst and adhesions   - Right fallopian tube with paratubal cysts and Walthard's rests   - Left fallopian tube with no significant histologic findings      Hx of estradiol PO 0.3 mg daily x 2 years post risk reducing salpingo oophorectomy     4/8/2019- FINAL DIAGNOSIS:   A: Breast, left, prophylactic mastectomy: Negative for malignancy.     B: Breast, right, prophylactic mastectomy: Negative for malignancy.     8/27/2018- Dermatology check (Dr. Fracisco Spencer) - all benign findings.    9/28/2022- Dermatology:       Skin of right lower leg lesion, excision-  -Inflamed hypertrophic seborrheic keratosis.  Negative for malignancy.       11/8/2022- Dermatology check (Dr. Jennifer Stover),    # Neoplasm of uncertain behavior, right abdomen  DDX: dysplastic nevus, rule out melanoma   - Shave biopsy:   # Numerous benign nevi  - She does have numerous irregular nevi, but all have symmetric pigment pattern on dermoscopy  - ABCDEs: Counseled ABCDEs of melanoma: Asymmetry, Border (irregularity), Color (not uniform, changes in color), Diameter (greater than 6 mm which is about the size of a pencil eraser), and Evolving (any changes in preexisting moles).  #Lesion to monitor, L breast- 6 mm brown macule with irregular, spokewheel like borders on the left breast. Unchanged for 10-20 years.     9/30/2022- MRCP:  IMPRESSION: Unremarkable appearance of the pancreas and biliary tract.  No acute or suspicious findings on today's exam.      Past Medical/Surgical History:  Past Medical History:   Diagnosis Date    BRCA1 gene mutation positive 6/15/2018    BRCA1 c.181T>G (p.C61G) tested at [facility] 5/9/2018    Vertigo,  benign paroxysmal, bilateral      Past Surgical History:   Procedure Laterality Date    CYSTOSCOPY N/A 2018    Procedure: CYSTOSCOPY;;  Surgeon: Marybeth Grande MD;  Location: [facility] OR    ENT SURGERY      tonsillectomy    HYSTERECTOMY TOTAL ABDOMINAL, BILATERAL SALPINGO-OOPHORECTOMY, NODE DISSECTION, COMBINED Bilateral 2018    Procedure: COMBINED HYSTERECTOMY TOTAL ABDOMINAL, SALPINGO-OOPHORECTOMY, NODE DISSECTION;;  Surgeon: Marybeth Grande MD;  Location: [facility] OR    INSERT TISSUE EXPANDER BREAST BILATERAL Bilateral 2019    Procedure: BILATERAL TISSUE EXPANDERS (GILDARDO);  Surgeon: Emily Gee MD;  Location: [facility] OR    LAPAROSCOPIC HYSTERECTOMY TOTAL, BILATERAL SALPINGO-OOPHORECTOMY, NODE DISSECTION, COMBINED N/A 2018    Procedure: COMBINED LAPAROSCOPIC HYSTERECTOMY TOTAL, SALPINGO-OOPHORECTOMY, NODE DISSECTION;  Laparoscopic Removal Of Uterus, Cervix, Both Ovaries And Fallopian Tubes, Cystoscopy ;  Surgeon: Marybeth Grande MD;  Location: [facility] OR    LAPAROSCOPY DIAGNOSTIC (GYN)  2006    endometriosis    MASTECTOMY SIMPLE BILATERAL Bilateral 2019    Procedure: BILATERAL PROPHYLACTIC MASTECTOMY (JUAN);  Surgeon: Roseann Boyd MD;  Location: [facility] OR    urethral reimplant  age 6       Allergies:  Allergies as of 2023 - Reviewed 2023   Allergen Reaction Noted    Chlorhexidine gluconate Rash 2018       Current Medications:  Current Outpatient Medications   Medication Sig Dispense Refill    citalopram (CELEXA) 20 MG tablet Take 0.5 tablets (10 mg) by mouth daily for 14 days, THEN 1 tablet (20 mg) daily. 90 tablet 3    estradiol (ESTRACE) 0.5 MG tablet Take 1 tablet (0.5 mg) by mouth daily (Patient not taking: Reported on 2023) 90 tablet 3        Family History:  Family History   Problem Relation Age of Onset    Ovarian Cancer Mother 38         age 47    Alcohol/Drug Brother         active    Alcohol/Drug Brother 41        cause of  death    Psychotic Disorder Brother         depression and ADHD    Hereditary Breast and Ovarian Cancer Syndrome Maternal Aunt 50        Breast cancer gene -- MEHRDAD/BSO and mastectomy    Breast Cancer Maternal Aunt 35        & MC age 36    Uterine Cancer Maternal Aunt     Pancreatic Cancer Maternal Uncle 38    Cerebral aneurysm Maternal Cousin     C.A.D. 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 Not Asked   Social History Narrative    How much exercise per week? One    How much calcium per day? Dairy products       How much caffeine per day? 1 cup coffee and 2 can of coke    How much vitamin D per day? None    Do you/your family wear seatbelts?  Yes    Do you/your family use safety helmets? yes    Do you/your family use sunscreen? Yes    Do you/your family keep firearms in the home? No    Do you/your family have a smoke detector(s)? Yes        Do you feel safe in your  home? Yes    Has anyone ever touched you in an unwanted manner? No     Explain     SEE GRAHAM, [facility]     06/16/2014    Beronica Dumas [facility] 04/26/18         Social Determinants of Health     Financial Resource Strain: Not on file   Food Insecurity: Not on file   Transportation Needs: Not on file   Physical Activity: Not on file   Stress: Not on file   Social Connections: Not on file   Intimate Partner Violence: Not on file   Housing Stability: Not on file       Physical Exam:  /73   Pulse 65   Resp 12   Wt 52.6 kg (116 lb)   LMP 12/17/2016   SpO2 98%   BMI 21.22 kg/m    GENERAL: Healthy, alert and no distress  EYES: Eyes grossly normal to inspection.  No discharge or erythema, or obvious scleral/conjunctival abnormalities.  RESP: No audible wheeze, cough, or visible cyanosis.  No visible retractions or increased work of breathing.    SKIN: Visible skin clear. No significant rash, abnormal pigmentation or lesions.  NEURO: Cranial nerves grossly intact.  Mentation and speech appropriate for age.  PSYCH: Mentation appears normal, affect normal/bright, judgement and insight intact, normal speech and appearance well-groomed.    Laboratory/Imaging Studies  No results found for any visits on 06/27/23.    ASSESSMENT  We reviewed her history and discussed that her risks for breast and ovarian cancer have been mitigated via surgery.  We discussed her continuing to monitor for any vision changes with her ophthalmologist and continued periodic skin checks. She has stopped taking her estradiol and feels that her mood has been more depressed. She has stopped due to the cost of the medication and we reviewed ways to save money using [employer]. She notes that she will look into this and refill her prescription.    She is well aware of the screening plan below.  At this point, she will also continue to have annual pancreatic screening using MRCP with Dr. Harris.  Individualized Surveillance Plan for women  Hereditary Breast  and/or Ovarian Cancer Syndrome   Per NCCN Guidelines Version 2.2023   Recommended screening Test or procedure Last done Next Scheduled    Breast self awareness starting at age 18.    Breast cancer risk >60% Women should be familiar with their breasts and promptly report changes to their care provider. Periodic, consistent self exam may facilitate breast self awareness. Premenopausal women may find self exams to be most informative when performed at the end of menses.   6/27/2023 June 2024   Breast screening, starting at age 25 Clinical breast exams every 6 -12 months NA- mastectomy Educated on signs/symptoms of issues with scar tissue and what to watch for   Breast screening   Age 25-29 Annual breast MRI screening with contrast (or mammogram if MRI is unavailable) or individualized based on family history if a breast cancer diagnosis before age 30 is present.       Breast MRI is performed preferably on day 7-15 of menstrual cycle for premenopausal women.     See below     See below   Breast screening   Age >30-75 years     Annual mammogram (consider tomosynthesis mammogram) and annual screening MRI.     Breast MRI is performed preferably on day 7-15 of menstrual cycle for premenopausal women.    Age>75 years, management should be considered on an individual basis. NA- Prophylactic mastectomy   NA   Ovarian cancer screening, starting at age 30-35    Absolute risk for epithelial ovarian cancer -39-58% for BRCA1+ carriers and 13-29% for BRCA2+ carriers   Consider transvaginal ultrasound and  tests.     NA, THBSO     NA   Pancreatic Cancer Screening beginning at age 50 or 10 years prior to the earliest pancreatic cancer on the BRCA-side of the family  In families with exocrine pancreatic cancer in a first or second degree relative    Risk is <5% for BRCA1+ carriers    5-10% for BRCA2+ carriers     Consider Annual MRI/MRCP and/or Endoscopic Ultrasound 9/30/2022- MRCP:  IMPRESSION: Unremarkable appearance of the  pancreas and biliary tract. MRCP in September with Dr. Harris   Recommendation: risk-reducing salpingo-oophorectomy(RRSO), typically between 35 and 40 years old and  upon completion of child bearing.     Because ovarian cancer onset in patients with BRCA2 mutations is on average of 8-10 years later than in patients with BRCA1 mutations, it is reasonable to delay RRSO until 40-45 years in patients with BRCA2 mutations  unless age at diagnosis in the family warrants earlier age for consideration for prophylactic surgery.    Limited data suggest that there may be a slightly increased risk of serous uterine cancer among women with BRCA1+ pathogenic variants. The provider and the patient should discuss the risks and benefits of a concurrent hysterectomy at the time of RRSO for women with a BRCA1+ pathogenic variant /like pathogenic variant prior to surgery.     Salpingectomy alone is not the standard of care for risk reduction although clinical trials are ongoing.     Discuss option of risk-reducing mastectomy.    Consider risks and benefits of risk reducing agents, such as tamoxifen and raloxifene for breast and ovarian cancer.    Consider a full body skin and eye exam for melanoma screening for both BRCA1+ and BRCA2+.     NOTE: Women with BRCA mutation who are treated for breast cancer should have screening of the remaining breast tissue with annual mammography and breast MRI.       I spent a total of 15 minutes on the day of the visit. Please see the note for further information on patient assessment and treatment.    Miesha Flowers, APRN-CNS, OCN, AGN-BC  Clinical Nurse Specialist  Cancer Risk Management Program  [employer]    CC: Juice Harris MD

## 2023-09-25 DIAGNOSIS — Z15.09 BRCA1 POSITIVE: Primary | ICD-10-CM

## 2023-09-25 DIAGNOSIS — Z15.01 BRCA1 POSITIVE: Primary | ICD-10-CM

## 2023-09-25 DIAGNOSIS — Z80.0 FAMILY HISTORY OF PANCREATIC CANCER: ICD-10-CM

## 2023-10-05 ENCOUNTER — HOSPITAL ENCOUNTER (OUTPATIENT)
Dept: MRI IMAGING | Facility: HOSPITAL | Age: 38
Discharge: HOME | End: 2023-10-05
Attending: INTERNAL MEDICINE | Admitting: INTERNAL MEDICINE
Payer: COMMERCIAL

## 2023-10-05 DIAGNOSIS — Z80.0 FAMILY HISTORY OF PANCREATIC CANCER: ICD-10-CM

## 2023-10-05 DIAGNOSIS — Z15.09 BRCA1 POSITIVE: ICD-10-CM

## 2023-10-05 DIAGNOSIS — Z15.01 BRCA1 POSITIVE: ICD-10-CM

## 2023-10-05 PROCEDURE — 255N000002 HC RX 255 OP 636: Performed by: INTERNAL MEDICINE

## 2023-10-05 PROCEDURE — A9585 GADOBUTROL INJECTION: HCPCS | Performed by: INTERNAL MEDICINE

## 2023-10-05 PROCEDURE — 74183 MRI ABD W/O CNTR FLWD CNTR: CPT

## 2023-10-05 RX ORDER — GADOBUTROL 604.72 MG/ML
0.1 INJECTION INTRAVENOUS ONCE
Status: COMPLETED | OUTPATIENT
Start: 2023-10-05 | End: 2023-10-05

## 2023-10-05 RX ADMIN — GADOBUTROL 5.26 ML: 604.72 INJECTION INTRAVENOUS at 19:50

## 2023-10-18 DIAGNOSIS — Z15.09 BRCA1 POSITIVE: Primary | ICD-10-CM

## 2023-10-18 DIAGNOSIS — Z15.01 BRCA1 POSITIVE: Primary | ICD-10-CM

## 2023-10-18 DIAGNOSIS — Z80.0 FAMILY HISTORY OF PANCREATIC CANCER: ICD-10-CM

## 2024-01-10 ENCOUNTER — OFFICE VISIT (OUTPATIENT)
Dept: DERMATOLOGY | Facility: CLINIC | Age: 39
End: 2024-01-10
Payer: COMMERCIAL

## 2024-01-10 DIAGNOSIS — D22.9 MULTIPLE NEVI: Primary | ICD-10-CM

## 2024-01-10 PROCEDURE — 99213 OFFICE O/P EST LOW 20 MIN: CPT | Mod: GC | Performed by: DERMATOLOGY

## 2024-01-10 ASSESSMENT — PAIN SCALES - GENERAL: PAINLEVEL: NO PAIN (0)

## 2024-01-10 NOTE — LETTER
1/10/2024       RE: Celeste Arguello  [address]  [place] [address]     Dear Colleague,    Thank you for referring your patient, Celeste Arguello, to the [facility] [place] [facility]. Please see a copy of my visit note below.    [facility] Dermatology Note  Encounter Date: Scot 10, 2024      Dermatology Problem List:  1. Hx of atypical nevus  - R lateral abdomen, compound dysplastic nev w mod atypia s/p bx removal 11/29/2023  2. BRCA 1(+) s/p prophylactic b/l mastectomy, and TLHBSOO  3. Lesion to monitor  - L breast, 5 x 5 mm spoke wheel distribution. Photo taken 11/29/22. Stable on recheck  4. Nevus Sebaceous, L temple within hair growing area  - photos taken 1/10/2023, measurement ~ 1.5 cm in length    ____________________________________________    Assessment & Plan:     # Nevus Sebaceous  Pt mentions lesion on the L temple that has been present for a long time, estimates ~ 10 years. Occasionally pruritic. Overall unchanging. Exam shows .6 x 1.5 cm orangish-yellow globular plaque on L temple without ulceration, telangiectasias, or brown pigment. Reviewed etiology and management options.   - Recommend monitoring this lesion   - Low threshold to bx any new growths or areas of ulceration within lesion    # Hx of atypical nevus  # BRCA 1 mutation  Nothing concerning found on today's exam.   - Continue annual exams.    # Numerous benign nevi  - She does have numerous irregular nevi, but all have symmetric pigment pattern on dermoscopy  - ABCDEs: Counseled ABCDEs of melanoma: Asymmetry, Border (irregularity), Color (not uniform, changes in color), Diameter (greater than 6 mm which is about the size of a pencil eraser), and Evolving (any changes in preexisting moles).    #Lesion to monitor, L breast  She does have a 5 x 5 mm brown macule with irregular, spokewheel like borders on the left breast.  Unchanged for 10-20 years.   - compared to 2022 photos, stable and uncahnged    Procedures Performed:   none      Follow-up: one year for FBSE    Staff and Resident:     IBibiana, saw and staffed this patient with the attending. All recommendations, therapies and procedures were pre-staffed with the attending or administered with direct supervision.     Jennifer RUBIO MD, saw this patient with the resident and agree with the resident s findings and plan of care as documented in the resident s note.   _____________________________________    CC: No chief complaint on file.    HPI:  Ms. Celeste Arguello is a(n) 38 year old female who presents today as a new patient for FBSE.     - has hx of atypical mole  - no personal of family hx of melanoma  - does have brca 1 mutation    - feeling well  - works as mental health provider    - Otherwise nothing changing, growing, bleeding, oozing, or itching.    Patient is otherwise feeling well, without additional skin concerns.    Labs Reviewed:  N/A    Physical Exam:  Vitals: LMP 12/17/2016    SKIN: Full skin, which includes the head/face, both arms, chest, back, abdomen,both legs, genitalia and/or groin buttocks, digits, was examined. Of note, finger and toenail polish is present.   - 0.6 x 1.5 cm orangish-yellow globular plaque on L temple without ulceration, telangiectasias, or brown pigment.  - On the left breast, there is a 5 x 5 mm medium brown macule with regular pigment network and irregular spokewheel like border, lesion is symmetric  - Multiple brown pigmented macules, some with slightly irregular border, all with regular pigment pattern, are identified on the stomach, back, anterior legs, buttocks, and inner thighs  - No other lesions of concern on areas examined.     Medications:  Current Outpatient Medications   Medication    citalopram (CELEXA) 20 MG tablet    estradiol (ESTRACE) 0.5 MG tablet     No current facility-administered medications for this  visit.      Past Medical History:   Patient Active Problem List   Diagnosis    Mother  of ovarian cancer, age 47    Vitamin d deficiency 23    Female genital symptoms    Encounter for routine gynecological examination    Endometriosis    BRCA1 gene mutation positive    Pelvic pain in female    BRCA gene mutation positive    Acquired absence of both breasts and nipples    Surgical menopause    Adjustment disorder with anxious mood     Past Medical History:   Diagnosis Date    BRCA1 gene mutation positive 6/15/2018    BRCA1 c.181T>G (p.C61G) tested at [facility] 2018    Vertigo, benign paroxysmal, bilateral

## 2024-01-10 NOTE — PATIENT INSTRUCTIONS

## 2024-01-10 NOTE — NURSING NOTE
Dermatology Rooming Note    Celeste Arguello's goals for this visit include:   Chief Complaint   Patient presents with    Derm Problem     FBSE- no spots of concern     Daniella Horvath, EMT     Detail Level: Simple

## 2024-01-10 NOTE — PROGRESS NOTES
[facility] Dermatology Note  Encounter Date: Scot 10, 2024      Dermatology Problem List:  1. Hx of atypical nevus  - R lateral abdomen, compound dysplastic nev w mod atypia s/p bx removal 11/29/2023  2. BRCA 1(+) s/p prophylactic b/l mastectomy, and TLHBSOO  3. Lesion to monitor  - L breast, 5 x 5 mm spoke wheel distribution. Photo taken 11/29/22. Stable on recheck  4. Nevus Sebaceous, L temple within hair growing area  - photos taken 1/10/2023, measurement ~ 1.5 cm in length    ____________________________________________    Assessment & Plan:     # Nevus Sebaceous  Pt mentions lesion on the L temple that has been present for a long time, estimates ~ 10 years. Occasionally pruritic. Overall unchanging. Exam shows .6 x 1.5 cm orangish-yellow globular plaque on L temple without ulceration, telangiectasias, or brown pigment. Reviewed etiology and management options.   - Recommend monitoring this lesion   - Low threshold to bx any new growths or areas of ulceration within lesion    # Hx of atypical nevus  # BRCA 1 mutation  Nothing concerning found on today's exam.   - Continue annual exams.    # Numerous benign nevi  - She does have numerous irregular nevi, but all have symmetric pigment pattern on dermoscopy  - ABCDEs: Counseled ABCDEs of melanoma: Asymmetry, Border (irregularity), Color (not uniform, changes in color), Diameter (greater than 6 mm which is about the size of a pencil eraser), and Evolving (any changes in preexisting moles).    #Lesion to monitor, L breast  She does have a 5 x 5 mm brown macule with irregular, spokewheel like borders on the left breast. Unchanged for 10-20 years.   - compared to 2022 photos, stable and uncahnged    Procedures Performed:   none      Follow-up: one year for FBSE    Staff and Resident:     Bibiana RUBIO, saw and staffed this patient with the attending. All recommendations, therapies and procedures were pre-staffed with the attending or administered  with direct supervision.     I, Jennifer Stover MD, saw this patient with the resident and agree with the resident s findings and plan of care as documented in the resident s note.   _____________________________________    CC: No chief complaint on file.    HPI:  Ms. Celeste Arguello is a(n) 38 year old female who presents today as a new patient for FBSE.     - has hx of atypical mole  - no personal of family hx of melanoma  - does have brca 1 mutation    - feeling well  - works as mental health provider    - Otherwise nothing changing, growing, bleeding, oozing, or itching.    Patient is otherwise feeling well, without additional skin concerns.    Labs Reviewed:  N/A    Physical Exam:  Vitals: LMP 2016    SKIN: Full skin, which includes the head/face, both arms, chest, back, abdomen,both legs, genitalia and/or groin buttocks, digits, was examined. Of note, finger and toenail polish is present.   - 0.6 x 1.5 cm orangish-yellow globular plaque on L temple without ulceration, telangiectasias, or brown pigment.  - On the left breast, there is a 5 x 5 mm medium brown macule with regular pigment network and irregular spokewheel like border, lesion is symmetric  - Multiple brown pigmented macules, some with slightly irregular border, all with regular pigment pattern, are identified on the stomach, back, anterior legs, buttocks, and inner thighs  - No other lesions of concern on areas examined.     Medications:  Current Outpatient Medications   Medication    citalopram (CELEXA) 20 MG tablet    estradiol (ESTRACE) 0.5 MG tablet     No current facility-administered medications for this visit.      Past Medical History:   Patient Active Problem List   Diagnosis    Mother  of ovarian cancer, age 47    Vitamin d deficiency 23    Female genital symptoms    Encounter for routine gynecological examination    Endometriosis    BRCA1 gene mutation positive    Pelvic pain in female    BRCA gene mutation positive     Acquired absence of both breasts and nipples    Surgical menopause    Adjustment disorder with anxious mood     Past Medical History:   Diagnosis Date    BRCA1 gene mutation positive 6/15/2018    BRCA1 c.181T>G (p.C61G) tested at [facility] 5/9/2018    Vertigo, benign paroxysmal, bilateral         Use Separate Skin Prep For Stages And Repair: Yes

## 2024-01-11 ENCOUNTER — TELEPHONE (OUTPATIENT)
Dept: DERMATOLOGY | Facility: CLINIC | Age: 39
End: 2024-01-11
Payer: COMMERCIAL

## 2024-01-11 NOTE — TELEPHONE ENCOUNTER
Left Voicemail (1st Attempt) and Sent Mychart (1st Attempt) for the patient to call back and schedule the following:    Appointment type: Return  Provider: Dr. Stover  Return date: Jan 2025  Specialty phone number: [phone]

## 2024-06-21 NOTE — PROGRESS NOTES
Virtual Visit Details    Type of service:  Video Visit     Originating Location (pt. Location): Home  Distant Location (provider location):  On-site  Platform used for Video Visit: [facility]    Oncology Risk Management Consultation:  Date on this visit: 2024    Celeste Arguello requires high risk screening and surveillance to reduce her risk of cancer secondary to Hereditary Breast and Ovarian Cancer Syndrome, linked to a BRCA1 mutation.  She is considered to be at high risk for hereditary breast and ovarian cancer. She has mitigated her risk by having a total laparoscopic hysterectomy and bilateral salpingo oophorectomy in 2018 as well as a risk reducing bilateral mastectomy in 2019.      Primary Physician: No Ref-Primary, Physician       History Of Present Illness:  Ms. Arguello is a very pleasant 39 year old female who presents with a family history of breast and ovarian cancer and a confirmed BRCA1 mutation.     Genetic Testin2018 - POSITIVE for a BRCA1 mutation. Specifically her mutation is called c.181T>G (p.C61G) identified using [employer] testing from [facility]. This testing was done because of her family history of ovarian and breast cancer.      Of note, Celeste tested negative for mutations in the following genes by sequencing and deletion/duplication analysis: ALEM, BARD1, BRCA1, BRCA2, BRIP1, CDH1, CHEK2, DICER1, EPCAM, MLH1, MRE11A, MSH2, MSH6, MUTYH, NBN, NF1, PALB2, PMS2, PTEN, RAD50, RAD51C, RAD51D, SMARCA4, STK11, and TP53 genes.     Pertinent History:   [facility]Dr[facility] MCKENZIE SANDERSON):  FINAL DIAGNOSIS:   A. IUD, REMOVAL:   - Intrauterine device identified (gross only)   B. UTERUS, CERVIX, BILATERAL FALLOPIAN TUBES AND OVARIES, TOTAL   LAPAROSCOPIC HYSTERECTOMY AND BILATERAL   SALPINGO-OOPHORECTOMY:   - Endometrium with inactive glands and decidualized stroma consistent with    exogenous progesterone effect   - Myometrium with no significant histologic findings   -  Cervix with no significant histologic findings   - Benign unremarkable right ovary   - Left ovary with corpus luteum cyst and adhesions   - Right fallopian tube with paratubal cysts and Walthard's rests   - Left fallopian tube with no significant histologic findings      Hx of estradiol PO 0.3 mg daily x 2 years post risk reducing salpingo oophorectomy     2019: RRM with Dr. Boyd and Dr. Gee -   FINAL DIAGNOSIS:   A: Breast, left, prophylactic mastectomy: Negative for malignancy.   B: Breast, right, prophylactic mastectomy: Negative for malignancy.     Pertinent Screening History:  Dermatology:  2018- Dermatology check (Dr. Fracisco Spencer) - all benign findings.  2022- Dermatology:  Excision of right lower leg, path, inflamed hypertrophic seborrheic keratosis. Negative for malignancy.   2022- Dermatology check (Dr. Jennifer Stover),    # Neoplasm of uncertain behavior, right abdomen  DDX: dysplastic nevus, rule out melanoma   - Shave biopsy:   # Numerous benign nevi  - She does have numerous irregular nevi, but all have symmetric pigment pattern on dermoscopy  #Lesion to monitor, L breast- 6 mm brown macule with irregular, spokewheel like borders on the left breast. Unchanged for 10-20 years.   1/10/2024: Dermatology check (Dr. Jennifer Stover),  no biopsies     Pancreatic Screenin2022- MRCP:  IMPRESSION: Unremarkable appearance of the pancreas and biliary tract. No acute or suspicious findings on today's exam.  10/5/2023: MRCP IMPRESSION: No significant findings MRI abdomen. Nothing for malignancy. Normal pancreas.    Past Medical/Surgical History:  Past Medical History:   Diagnosis Date    BRCA1 gene mutation positive 6/15/2018    BRCA1 c.181T>G (p.C61G) tested [facility] 2018    Vertigo, benign paroxysmal, bilateral      Past Surgical History:   Procedure Laterality Date    CYSTOSCOPY N/A 2018    Procedure: CYSTOSCOPY;;  Surgeon: Marybeth Grande MD;   Location: [facility] OR    ENT SURGERY      tonsillectomy    HYSTERECTOMY TOTAL ABDOMINAL, BILATERAL SALPINGO-OOPHORECTOMY, NODE DISSECTION, COMBINED Bilateral 2018    Procedure: COMBINED HYSTERECTOMY TOTAL ABDOMINAL, SALPINGO-OOPHORECTOMY, NODE DISSECTION;;  Surgeon: Marybeth Grande MD;  Location: [facility] OR    INSERT TISSUE EXPANDER BREAST BILATERAL Bilateral 2019    Procedure: BILATERAL TISSUE EXPANDERS (GILDARDO);  Surgeon: Emily Gee MD;  Location: [facility] OR    LAPAROSCOPIC HYSTERECTOMY TOTAL, BILATERAL SALPINGO-OOPHORECTOMY, NODE DISSECTION, COMBINED N/A 2018    Procedure: COMBINED LAPAROSCOPIC HYSTERECTOMY TOTAL, SALPINGO-OOPHORECTOMY, NODE DISSECTION;  Laparoscopic Removal Of Uterus, Cervix, Both Ovaries And Fallopian Tubes, Cystoscopy ;  Surgeon: Marybeth Grande MD;  Location: [facility] OR    LAPAROSCOPY DIAGNOSTIC (GYN)  2006    endometriosis    MASTECTOMY SIMPLE BILATERAL Bilateral 2019    Procedure: BILATERAL PROPHYLACTIC MASTECTOMY (JUAN);  Surgeon: Roseann Boyd MD;  Location: [facility] OR    urethral reimplant  age 6       Allergies:  Allergies as of 2024 - Reviewed 2024   Allergen Reaction Noted    Chlorhexidine gluconate Rash 2018       Current Medications:  Current Outpatient Medications   Medication Sig Dispense Refill    citalopram (CELEXA) 20 MG tablet Take 0.5 tablets (10 mg) by mouth daily for 14 days, THEN 1 tablet (20 mg) daily. 90 tablet 3    estradiol (ESTRACE) 0.5 MG tablet Take 1 tablet (0.5 mg) by mouth daily 90 tablet 3        Family History:  Family History   Problem Relation Age of Onset    Ovarian Cancer Mother 38         age 47    Alcohol/Drug Brother         active    Alcohol/Drug Brother 41        cause of death    Psychotic Disorder Brother         depression and ADHD    Hereditary Breast and Ovarian Cancer Syndrome Maternal Aunt 50        Breast cancer gene -- MEHRDAD/BSO and mastectomy    Breast Cancer Maternal Aunt 35        & MC  age 36    Uterine Cancer Maternal Aunt     Pancreatic Cancer Maternal Uncle 38    Cerebral aneurysm Maternal Cousin     C.A.D. No family hx of     Diabetes No family hx of     Hypertension No family hx of     Coronary Artery Disease No family hx of     Cerebrovascular Disease No family hx of     Hyperlipidemia No family hx of     Colon Cancer No family hx of     Prostate Cancer No family hx of     Thyroid Disease No family hx of     Skin Cancer No family hx of     Melanoma No family hx of        Social History:  Social History     Socioeconomic History    Marital status: Single     Spouse name: NA    Number of children: 1    Years of education: Not on file    Highest education level: Not on file   Occupational History    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 Not Asked   Social History Narrative    How much exercise per week? One    How much calcium per day? Dairy products       How much caffeine per day? 1 cup coffee and 2 can of coke    How much vitamin D per day? None    Do you/your family wear seatbelts?  Yes    Do you/your family use safety helmets? yes    Do you/your family use sunscreen? Yes    Do you/your family keep firearms in the home? No    Do you/your family have a smoke detector(s)? Yes        Do you feel safe in your home? Yes    Has anyone ever touched you in an unwanted manner? No     Explain     SEE KIMBERLY GRAHAM     2014    [place]  "[facility] 04/26/18         Social Determinants of Health     Financial Resource Strain: Not on file   Food Insecurity: Not on file   Transportation Needs: Not on file   Physical Activity: Not on file   Stress: Not on file   Social Connections: Unknown (3/17/2022)    [facility], [facility]    Social Connections     Frequency of Communication with Friends and Family: Not on file   Interpersonal Safety: Not on file   Housing Stability: Not on file       Physical Exam:  Ht 1.549 m (5' 1\")   Wt 53.5 kg (118 lb)   LMP 12/17/2016   BMI 22.30 kg/m    GENERAL: alert and no distress  EYES: Eyes grossly normal to inspection.  No discharge or erythema, or obvious scleral/conjunctival abnormalities.  RESP: No audible wheeze, cough, or visible cyanosis.    SKIN: Visible skin clear. No significant rash, abnormal pigmentation or lesions.  NEURO: Cranial nerves grossly intact.  Mentation and speech appropriate for age.  PSYCH: Appropriate affect, tone, and pace of words    Laboratory/Imaging Studies  No results found for any visits on 06/26/24.    TREMAYNE Alonso reports that she is doing well at this visit. She has no updates to her family's medical history, and no health concerns at this time. She has mitigated much of her risk with having a THBSO and prophylactic mastectomy. We discussed that she is still at risk for pancreatic cancer. She is getting annual MRCPs to screen for this. We also discussed the association with melanoma. She follows annually with Dr. Stover for skin screenings. She would like to continue following annually in order to stay updated on any guideline changes. I will be happy to see her in one year.     We revisited the recommended healthy lifestyle plan endorsed by both NCCN and the American Cancer Society that can reduce the risk of breast cancer:  1. Limit alcohol consumption to less than 1 drink per day (1 " drink=5 oz.wine, 12 oz. Beer or 1.5 oz. 80-proof liquor).  2. Exercise per American Cancer Society guidelines of at least 150 minutes of moderate-intensity activity or 75 minutes of vigorous activity each week. (Or a combination of both.) Exercise should be spread  out over the week.  3. Maintain a healthy weight with a Body Mass Index between 19-24.9.  4. Do not use tobacco products and limit exposure to passive smoke.      INDIVIDUALIZED CANCER RISK MANAGEMENT PLAN:    Individualized Surveillance Plan for women  Hereditary Breast and/or Ovarian Cancer Syndrome   Per NCCN Guidelines Version 3.2024   Recommended screening Test or procedure Last done Next Scheduled    Breast self awareness starting at age 18.    Breast cancer risk >60% Women should be familiar with their breasts and promptly report changes to their care provider. Periodic, consistent self exam may facilitate breast self awareness. Premenopausal women may find self exams to be most informative when performed at the end of menses.   June 2024 June 2024   Breast screening, starting at age 25 Clinical breast exams every 6 -12 months NA, mastectomy    Breast screening   Age 25-29 Annual breast MRI screening with contrast (or mammogram if MRI is unavailable) or individualized based on family history if a breast cancer diagnosis before age 30 is present.       Breast MRI is performed preferably on day 7-15 of menstrual cycle for premenopausal women.   NA   NA   Breast screening   Age >30-75 years     Annual mammogram (consider tomosynthesis mammogram) and annual screening MRI.     Breast MRI is performed preferably on day 7-15 of menstrual cycle for premenopausal women.    Age>75 years, management should be considered on an individual basis.   NA   NA   Ovarian cancer screening, starting at age 30-35    Absolute risk for epithelial ovarian cancer -39-58% for BRCA1+ carriers and 13-29% for BRCA2+ carriers   Consider transvaginal ultrasound and  tests.    NA, THBSO   NA   Pancreatic Cancer Screening beginning at age 50 or 10 years prior to the earliest pancreatic cancer on the BRCA-side of the family  In families with exocrine pancreatic cancer in a first or second degree relative    Risk is <5% for BRCA1+ carriers    5-10% for BRCA2+ carriers     Consider Annual MRI/MRCP and/or Endoscopic Ultrasound   10/5/2023: MRCP IMPRESSION:  1.  No significant findings MRI abdomen.  2.  Nothing for malignancy.  3.  Normal pancreas.   MRCP on 10/18/2024   Recommendation: risk-reducing salpingo-oophorectomy(RRSO), typically between 35 and 40 years old recognizing childbearing is a consideration.    Because ovarian cancer onset in patients with BRCA2 mutations is on average of 8-10 years later than in patients with BRCA1 mutations, it is reasonable to delay RRSO until 40-45 years in patients with BRCA2 mutations  unless age at diagnosis in the family warrants earlier age for consideration for prophylactic surgery.    Limited data suggest that there may be a slightly increased risk of serous uterine cancer among women with BRCA1+ pathogenic variants. The provider and the patient should discuss the risks and benefits of a concurrent hysterectomy at the time of RRSO for women with a BRCA1+ pathogenic variant /like pathogenic variant prior to surgery.     Salpingectomy alone is not the standard of care for risk reduction although clinical trials are ongoing.     Discuss option of risk-reducing mastectomy.    Consider risks and benefits of risk reducing agents, such as tamoxifen and raloxifene for breast and ovarian cancer.    Consider a full body skin and eye exam for melanoma screening for both BRCA1+ and BRCA2+.     NOTE: Women with BRCA mutation who are treated for breast cancer should have screening of the remaining breast tissue with annual mammography and breast MRI.         I spent a total of 24 minutes on the day of the visit. Please see the note for further information on  patient assessment and treatment.     Kristin Harvey DNP, APRN, AGCNS-BC  Clinical Nurse Specialist  Cancer Risk Management Program  [facility]

## 2024-06-23 ENCOUNTER — HEALTH MAINTENANCE LETTER (OUTPATIENT)
Age: 39
End: 2024-06-23

## 2024-06-26 ENCOUNTER — VIRTUAL VISIT (OUTPATIENT)
Dept: ONCOLOGY | Facility: CLINIC | Age: 39
End: 2024-06-26
Payer: COMMERCIAL

## 2024-06-26 VITALS — BODY MASS INDEX: 22.28 KG/M2 | WEIGHT: 118 LBS | HEIGHT: 61 IN

## 2024-06-26 DIAGNOSIS — Z15.01 BRCA1 POSITIVE: Primary | ICD-10-CM

## 2024-06-26 DIAGNOSIS — Z15.09 BRCA1 POSITIVE: Primary | ICD-10-CM

## 2024-06-26 PROCEDURE — 99202 OFFICE O/P NEW SF 15 MIN: CPT | Mod: 95

## 2024-06-26 NOTE — PATIENT INSTRUCTIONS
Individualized Surveillance Plan for women  Hereditary Breast and/or Ovarian Cancer Syndrome   Per NCCN Guidelines Version 3.2024   Recommended screening Test or procedure Last done Next Scheduled    Breast self awareness starting at age 18.    Breast cancer risk >60% Women should be familiar with their breasts and promptly report changes to their care provider. Periodic, consistent self exam may facilitate breast self awareness. Premenopausal women may find self exams to be most informative when performed at the end of menses.   June 2024 June 2024   Breast screening, starting at age 25 Clinical breast exams every 6 -12 months NA, mastectomy    Breast screening   Age 25-29 Annual breast MRI screening with contrast (or mammogram if MRI is unavailable) or individualized based on family history if a breast cancer diagnosis before age 30 is present.       Breast MRI is performed preferably on day 7-15 of menstrual cycle for premenopausal women.   NA   NA   Breast screening   Age >30-75 years     Annual mammogram (consider tomosynthesis mammogram) and annual screening MRI.     Breast MRI is performed preferably on day 7-15 of menstrual cycle for premenopausal women.    Age>75 years, management should be considered on an individual basis.   NA   NA   Ovarian cancer screening, starting at age 30-35    Absolute risk for epithelial ovarian cancer -39-58% for BRCA1+ carriers and 13-29% for BRCA2+ carriers   Consider transvaginal ultrasound and  tests.   NA, THBSO   NA   Pancreatic Cancer Screening beginning at age 50 or 10 years prior to the earliest pancreatic cancer on the BRCA-side of the family  In families with exocrine pancreatic cancer in a first or second degree relative    Risk is <5% for BRCA1+ carriers    5-10% for BRCA2+ carriers     Consider Annual MRI/MRCP and/or Endoscopic Ultrasound   10/5/2023: MRCP IMPRESSION:  1.  No significant findings MRI abdomen.  2.  Nothing for malignancy.  3.  Normal  pancreas.   MRCP on 10/18/2024   Recommendation: risk-reducing salpingo-oophorectomy(RRSO), typically between 35 and 40 years old recognizing childbearing is a consideration.    Because ovarian cancer onset in patients with BRCA2 mutations is on average of 8-10 years later than in patients with BRCA1 mutations, it is reasonable to delay RRSO until 40-45 years in patients with BRCA2 mutations  unless age at diagnosis in the family warrants earlier age for consideration for prophylactic surgery.    Limited data suggest that there may be a slightly increased risk of serous uterine cancer among women with BRCA1+ pathogenic variants. The provider and the patient should discuss the risks and benefits of a concurrent hysterectomy at the time of RRSO for women with a BRCA1+ pathogenic variant /like pathogenic variant prior to surgery.     Salpingectomy alone is not the standard of care for risk reduction although clinical trials are ongoing.     Discuss option of risk-reducing mastectomy.    Consider risks and benefits of risk reducing agents, such as tamoxifen and raloxifene for breast and ovarian cancer.    Consider a full body skin and eye exam for melanoma screening for both BRCA1+ and BRCA2+.     NOTE: Women with BRCA mutation who are treated for breast cancer should have screening of the remaining breast tissue with annual mammography and breast MRI.

## 2024-06-26 NOTE — NURSING NOTE
Is the patient currently in the state of [place]? YES    Visit mode:VIDEO    If the visit is dropped, the patient can be reconnected by: TELEPHONE VISIT: Phone number: [phone]    Will anyone else be joining the visit? NO  (If patient encounters technical issues they should call [phone]    How would you like to obtain your AVS? MyChart    Are changes needed to the allergy or medication list? No    Are refills needed on medications prescribed by this physician? NO    Reason for visit: RECHECK    Damaris GOMEZ

## 2024-06-26 NOTE — LETTER
2024      Celeste Arguello  [address]  [place] [address]      Dear Colleague,    Thank you for referring your patient, Celeste Arguello, to the [facility]. Please see a copy of my visit note below.    Virtual Visit Details    Type of service:  Video Visit     Originating Location (pt. Location): Home  Distant Location (provider location):  On-site  Platform used for Video Visit: [facility]    Oncology Risk Management Consultation:  Date on this visit: 2024    Celeste Arguello requires high risk screening and surveillance to reduce her risk of cancer secondary to Hereditary Breast and Ovarian Cancer Syndrome, linked to a BRCA1 mutation.  She is considered to be at high risk for hereditary breast and ovarian cancer. She has mitigated her risk by having a total laparoscopic hysterectomy and bilateral salpingo oophorectomy in 2018 as well as a risk reducing bilateral mastectomy in 2019.      Primary Physician: No Ref-Primary, Physician       History Of Present Illness:  Ms. Arguello is a very pleasant 39 year old female who presents with a family history of breast and ovarian cancer and a confirmed BRCA1 mutation.     Genetic Testin2018 - POSITIVE for a BRCA1 mutation. Specifically her mutation is called c.181T>G (p.C61G) identified using O[facility] Next testing from [facility]. This testing was done because of her family history of ovarian and breast cancer.      Of note, Celeste tested negative for mutations in the following genes by sequencing and deletion/duplication analysis: ALEM, BARD1, BRCA1, BRCA2, BRIP1, CDH1, CHEK2, DICER1, EPCAM, MLH1, MRE11A, MSH2, MSH6, MUTYH, NBN, NF1, PALB2, PMS2, PTEN, RAD50, RAD51C, RAD51D, SMARCA4, STK11, and TP53 genes.     Pertinent History:   [facility]Dr[facility]CKENZ[facility]AND[facility]):  FINAL DIAGNOSIS:   A. IUD, REMOVAL:   - Intrauterine device identified (gross only)   B. UTERUS, CERVIX, BILATERAL FALLOPIAN TUBES  AND OVARIES, TOTAL   LAPAROSCOPIC HYSTERECTOMY AND BILATERAL   SALPINGO-OOPHORECTOMY:   - Endometrium with inactive glands and decidualized stroma consistent with    exogenous progesterone effect   - Myometrium with no significant histologic findings   - Cervix with no significant histologic findings   - Benign unremarkable right ovary   - Left ovary with corpus luteum cyst and adhesions   - Right fallopian tube with paratubal cysts and Walthard's rests   - Left fallopian tube with no significant histologic findings      Hx of estradiol PO 0.3 mg daily x 2 years post risk reducing salpingo oophorectomy     2019: RRM with Dr. Boyd and Dr. Gee -   FINAL DIAGNOSIS:   A: Breast, left, prophylactic mastectomy: Negative for malignancy.   B: Breast, right, prophylactic mastectomy: Negative for malignancy.     Pertinent Screening History:  Dermatology:  2018- Dermatology check (Dr. Fracisco Spencer) - all benign findings.  2022- Dermatology:  Excision of right lower leg, path, inflamed hypertrophic seborrheic keratosis. Negative for malignancy.   2022- Dermatology check (Dr. Jennifer Stover),    # Neoplasm of uncertain behavior, right abdomen  DDX: dysplastic nevus, rule out melanoma   - Shave biopsy:   # Numerous benign nevi  - She does have numerous irregular nevi, but all have symmetric pigment pattern on dermoscopy  #Lesion to monitor, L breast- 6 mm brown macule with irregular, spokewheel like borders on the left breast. Unchanged for 10-20 years.   1/10/2024: Dermatology check (Dr. Jennifer Stover),  no biopsies     Pancreatic Screenin2022- MRCP:  IMPRESSION: Unremarkable appearance of the pancreas and biliary tract. No acute or suspicious findings on today's exam.  10/5/2023: MRCP IMPRESSION: No significant findings MRI abdomen. Nothing for malignancy. Normal pancreas.    Past Medical/Surgical History:  Past Medical History:   Diagnosis Date     BRCA1 gene mutation positive  6/15/2018    BRCA1 c.181T>G (p.C61G) tested at [facility] 2018     Vertigo, benign paroxysmal, bilateral      Past Surgical History:   Procedure Laterality Date     CYSTOSCOPY N/A 2018    Procedure: CYSTOSCOPY;;  Surgeon: Marybeth Grande MD;  Location:  OR     ENT SURGERY      tonsillectomy     HYSTERECTOMY TOTAL ABDOMINAL, BILATERAL SALPINGO-OOPHORECTOMY, NODE DISSECTION, COMBINED Bilateral 2018    Procedure: COMBINED HYSTERECTOMY TOTAL ABDOMINAL, SALPINGO-OOPHORECTOMY, NODE DISSECTION;;  Surgeon: Marybeth Grande MD;  Location:  OR     INSERT TISSUE EXPANDER BREAST BILATERAL Bilateral 2019    Procedure: BILATERAL TISSUE EXPANDERS (GILDARDO);  Surgeon: Emily eGe MD;  Location:  OR     LAPAROSCOPIC HYSTERECTOMY TOTAL, BILATERAL SALPINGO-OOPHORECTOMY, NODE DISSECTION, COMBINED N/A 2018    Procedure: COMBINED LAPAROSCOPIC HYSTERECTOMY TOTAL, SALPINGO-OOPHORECTOMY, NODE DISSECTION;  Laparoscopic Removal Of Uterus, Cervix, Both Ovaries And Fallopian Tubes, Cystoscopy ;  Surgeon: Marybeth Grande MD;  Location:  OR     LAPAROSCOPY DIAGNOSTIC (GYN)      endometriosis     MASTECTOMY SIMPLE BILATERAL Bilateral 2019    Procedure: BILATERAL PROPHYLACTIC MASTECTOMY (JUAN);  Surgeon: Roseann Boyd MD;  Location:  OR     urethral reimplant  age 6       Allergies:  Allergies as of 2024 - Reviewed 2024   Allergen Reaction Noted     Chlorhexidine gluconate Rash 2018       Current Medications:  Current Outpatient Medications   Medication Sig Dispense Refill     citalopram (CELEXA) 20 MG tablet Take 0.5 tablets (10 mg) by mouth daily for 14 days, THEN 1 tablet (20 mg) daily. 90 tablet 3     estradiol (ESTRACE) 0.5 MG tablet Take 1 tablet (0.5 mg) by mouth daily 90 tablet 3        Family History:  Family History   Problem Relation Age of Onset     Ovarian Cancer Mother 38         age 47     Alcohol/Drug Brother         active      Alcohol/Drug Brother 41        cause of death     Psychotic Disorder Brother         depression and ADHD     Hereditary Breast and Ovarian Cancer Syndrome Maternal Aunt 50        Breast cancer gene -- MEHRDAD/BSO and mastectomy     Breast Cancer Maternal Aunt 35        & MC age 36     Uterine Cancer Maternal Aunt      Pancreatic Cancer Maternal Uncle 38     Cerebral aneurysm Maternal Cousin      C.A.D. No family hx of      Diabetes No family hx of      Hypertension No family hx of      Coronary Artery Disease No family hx of      Cerebrovascular Disease No family hx of      Hyperlipidemia No family hx of      Colon Cancer No family hx of      Prostate Cancer No family hx of      Thyroid Disease No family hx of      Skin Cancer No family hx of      Melanoma No family hx of        Social History:  Social History     Socioeconomic History     Marital status: Single     Spouse name: NA     Number of children: 1     Years of education: Not on file     Highest education level: Not on file   Occupational History     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 Not Asked   Social History Narrative    How much exercise per week? One    How much calcium per day? Dairy products       How much caffeine per day? 1 cup coffee and 2 can of coke    How much vitamin D per day? None    Do you/your family wear seatbelts?  Yes    Do you/your family use safety helmets? yes    Do  "you/your family use sunscreen? Yes    Do you/your family keep firearms in the home? No    Do you/your family have a smoke detector(s)? Yes        Do you feel safe in your home? Yes    Has anyone ever touched you in an unwanted manner? No     Explain     SEE GLADYS [facility]     06/16/2014    Beronica Dumas [facility] 04/26/18         Social Determinants of Health     Financial Resource Strain: Not on file   Food Insecurity: Not on file   Transportation Needs: Not on file   Physical Activity: Not on file   Stress: Not on file   Social Connections: Unknown (3/17/2022)    [facility]    Social Connections      Frequency of Communication with Friends and Family: Not on file   Interpersonal Safety: Not on file   Housing Stability: Not on file       Physical Exam:  Ht 1.549 m (5' 1\")   Wt 53.5 kg (118 lb)   LMP 12/17/2016   BMI 22.30 kg/m    GENERAL: alert and no distress  EYES: Eyes grossly normal to inspection.  No discharge or erythema, or obvious scleral/conjunctival abnormalities.  RESP: No audible wheeze, cough, or visible cyanosis.    SKIN: Visible skin clear. No significant rash, abnormal pigmentation or lesions.  NEURO: Cranial nerves grossly intact.  Mentation and speech appropriate for age.  PSYCH: Appropriate affect, tone, and pace of words    Laboratory/Imaging Studies  No results found for any visits on 06/26/24.    TREMAYNE Alonso reports that she is doing well at this visit. She has no updates to her family's medical history, and no health concerns at this time. She has mitigated much of her risk with having a THBSO and prophylactic mastectomy. We discussed that she is still at risk for pancreatic cancer. She is getting annual MRCPs to screen for this. We also discussed the association with melanoma. She follows annually with Dr. Stover for skin screenings. She would like to continue following annually in order to " stay updated on any guideline changes. I will be happy to see her in one year.     We revisited the recommended healthy lifestyle plan endorsed by both NCCN and the American Cancer Society that can reduce the risk of breast cancer:  1. Limit alcohol consumption to less than 1 drink per day (1 drink=5 oz.wine, 12 oz. Beer or 1.5 oz. 80-proof liquor).  2. Exercise per American Cancer Society guidelines of at least 150 minutes of moderate-intensity activity or 75 minutes of vigorous activity each week. (Or a combination of both.) Exercise should be spread  out over the week.  3. Maintain a healthy weight with a Body Mass Index between 19-24.9.  4. Do not use tobacco products and limit exposure to passive smoke.      INDIVIDUALIZED CANCER RISK MANAGEMENT PLAN:    Individualized Surveillance Plan for women  Hereditary Breast and/or Ovarian Cancer Syndrome   Per NCCN Guidelines Version 3.2024   Recommended screening Test or procedure Last done Next Scheduled    Breast self awareness starting at age 18.    Breast cancer risk >60% Women should be familiar with their breasts and promptly report changes to their care provider. Periodic, consistent self exam may facilitate breast self awareness. Premenopausal women may find self exams to be most informative when performed at the end of menses.   June 2024 June 2024   Breast screening, starting at age 25 Clinical breast exams every 6 -12 months NA, mastectomy    Breast screening   Age 25-29 Annual breast MRI screening with contrast (or mammogram if MRI is unavailable) or individualized based on family history if a breast cancer diagnosis before age 30 is present.       Breast MRI is performed preferably on day 7-15 of menstrual cycle for premenopausal women.   NA   NA   Breast screening   Age >30-75 years     Annual mammogram (consider tomosynthesis mammogram) and annual screening MRI.     Breast MRI is performed preferably on day 7-15 of menstrual cycle for premenopausal  women.    Age>75 years, management should be considered on an individual basis.   NA   NA   Ovarian cancer screening, starting at age 30-35    Absolute risk for epithelial ovarian cancer -39-58% for BRCA1+ carriers and 13-29% for BRCA2+ carriers   Consider transvaginal ultrasound and  tests.   NA, THBSO   NA   Pancreatic Cancer Screening beginning at age 50 or 10 years prior to the earliest pancreatic cancer on the BRCA-side of the family  In families with exocrine pancreatic cancer in a first or second degree relative    Risk is <5% for BRCA1+ carriers    5-10% for BRCA2+ carriers     Consider Annual MRI/MRCP and/or Endoscopic Ultrasound   10/5/2023: MRCP IMPRESSION:  1.  No significant findings MRI abdomen.  2.  Nothing for malignancy.  3.  Normal pancreas.   MRCP on 10/18/2024   Recommendation: risk-reducing salpingo-oophorectomy(RRSO), typically between 35 and 40 years old recognizing childbearing is a consideration.    Because ovarian cancer onset in patients with BRCA2 mutations is on average of 8-10 years later than in patients with BRCA1 mutations, it is reasonable to delay RRSO until 40-45 years in patients with BRCA2 mutations  unless age at diagnosis in the family warrants earlier age for consideration for prophylactic surgery.    Limited data suggest that there may be a slightly increased risk of serous uterine cancer among women with BRCA1+ pathogenic variants. The provider and the patient should discuss the risks and benefits of a concurrent hysterectomy at the time of RRSO for women with a BRCA1+ pathogenic variant /like pathogenic variant prior to surgery.     Salpingectomy alone is not the standard of care for risk reduction although clinical trials are ongoing.     Discuss option of risk-reducing mastectomy.    Consider risks and benefits of risk reducing agents, such as tamoxifen and raloxifene for breast and ovarian cancer.    Consider a full body skin and eye exam for melanoma screening for  both BRCA1+ and BRCA2+.     NOTE: Women with BRCA mutation who are treated for breast cancer should have screening of the remaining breast tissue with annual mammography and breast MRI.         I spent a total of 24 minutes on the day of the visit. Please see the note for further information on patient assessment and treatment.     Kristin Harvey DNP, ENRIQUE, [facility]  Clinical Nurse Specialist  Cancer Risk Management Program  [facility]                          Again, thank you for allowing me to participate in the care of your patient.        Sincerely,        ENRIQUE Pimentel CNP

## 2024-10-18 ENCOUNTER — ANCILLARY PROCEDURE (OUTPATIENT)
Dept: MRI IMAGING | Facility: CLINIC | Age: 39
End: 2024-10-18
Attending: INTERNAL MEDICINE
Payer: COMMERCIAL

## 2024-10-18 DIAGNOSIS — Z80.0 FAMILY HISTORY OF PANCREATIC CANCER: ICD-10-CM

## 2024-10-18 DIAGNOSIS — Z15.01 BRCA1 POSITIVE: ICD-10-CM

## 2024-10-18 DIAGNOSIS — Z15.09 BRCA1 POSITIVE: ICD-10-CM

## 2024-10-18 PROCEDURE — 74183 MRI ABD W/O CNTR FLWD CNTR: CPT | Performed by: RADIOLOGY

## 2024-10-18 PROCEDURE — A9585 GADOBUTROL INJECTION: HCPCS | Performed by: RADIOLOGY

## 2024-10-18 RX ORDER — GADOBUTROL 604.72 MG/ML
7.5 INJECTION INTRAVENOUS ONCE
Status: COMPLETED | OUTPATIENT
Start: 2024-10-18 | End: 2024-10-18

## 2024-10-18 RX ADMIN — GADOBUTROL 5.5 ML: 604.72 INJECTION INTRAVENOUS at 10:46

## 2024-10-18 NOTE — DISCHARGE INSTRUCTIONS
MRI Contrast Discharge Instructions    The IV contrast you received today will pass out of your body in your  urine. This will happen in the next 24 hours. You will not feel this process.  Your urine will not change color.    Drink at least 4 extra glasses of water or juice today (unless your doctor  has restricted your fluids). This reduces the stress on your kidneys.  You may take your regular medicines.    If you are on dialysis: It is best to have dialysis today.    If you have a reaction: Most reactions happen right away. If you have  any new symptoms after leaving the hospital (such as hives or swelling),  call your hospital at the correct number below. Or call your family doctor.  If you have breathing distress or wheezing, call 911.    Special instructions: ***    I have read and understand the above information.    Signature:______________________________________ Date:___________    Staff:__________________________________________ Date:___________     Time:__________    [facility] Departments:    ___[place]: [phone]  ___[facility]: [phone]  ___[place]: [phone] ___[facility]: [phone]  ___[facility]: [phone]  ___[facility]: [phone]  ___[phone]  ___[facility]: [phone]  ___[phone]

## 2024-10-18 NOTE — LETTER
Patient:  Celeste Arguello  :   1985  MRN:     [id number]        Ms.Breanne TRICE Arguello  [address]  [place] [address]        2024    Dear ,    We are writing to inform you of your test results. Again, good news. The MRI/MRCP was without any suggestion pancreatic abnormality. In fact the entire study was again normal. As discussed previously, our plan will include yearly surveillance of the pancreas by MRI given the diagnosis of your relative at a young age.     I have included the formal documtentation of the results below. It continues to be a pleasure participating in your care.  Please feel free to contact our clinic with any further questions.      Sincerely,    Juice Harris MD PhD FACG OMA POMPA  Professor of Medicine, Surgery and Pediatrics  Interventional and Therapeutic Endoscopy  Chief, Division of Gastroenterology and Hepatology and Nutrition  GI Service     [facility] [address] [address]    New Consultations  [phone]  Procedure Scheduling [phone]  Clinical Nurse Coordinator [phone]  Clinical Fax   [phone]  Administrative   [phone]  Administrative Fax  [phone]        Resulted Orders   MRI Abdomen w & w/o contrast mrcp    Narrative    MRCP Without and With Contrast    CLINICAL HISTORY: BRCA1 positive; BRCA1 positive; Family history of  pancreatic cancer    DATE: 10/18/2024 10:46 AM    TECHNIQUE:     Images were acquired with and without intravenous gadolinium contrast  through the upper abdomen. The following MR images were acquired  without intravenous contrast: TrueFISP, multiplanar T2-weighted, axial  T1 in/out of phase, T2-weighted MRCP images, axial diffusion-weighted  and axial apparent diffusion coefficient. T1-weighted images were  obtained before contrast at the multiple time points following  contrast injection. 3-D reformatted images  were generated by the  technologist. Contrast dose: 5.5mL Gadavist    Comparison study: MRI 10/5/2023    FINDINGS:    Biliary Tree: No intra or extrahepatic biliary ductal dilation. No  filling defects.    Pancreas: Normal intrinsic T1 signal. No mass or ductal dilation.    Liver: Noncirrhotic morphology. No significant fat or iron deposition.  Few T2 hyperintense cysts. No enhancing hepatic lesions.    Gallbladder: No gallstones.    Spleen: Normal.    Kidneys: No mass or hydronephrosis.     Adrenal glands: Normal.    Bowel: No evidence of bowel obstruction.    Lymph nodes: No suspicious lymphadenopathy.    Blood vessels: Patent vasculature. No infrarenal aortic aneurysm.    Lung bases: Clear.    Bones and soft tissues: No suspicious osseous lesions. Bilateral  breast implants.    Mesentery and abdominal wall: No hernia.    Ascites: None.      Impression    IMPRESSION:   Normal appearance of the pancreas and biliary tract. No acute or  suspicious findings.    I have personally reviewed the examination and initial interpretation  and I agree with the findings.    SVETLANA ORTIZ DO         SYSTEM ID:  [id number]

## 2024-10-21 DIAGNOSIS — Z15.09 BRCA1 POSITIVE: ICD-10-CM

## 2024-10-21 DIAGNOSIS — Z15.01 BRCA1 POSITIVE: ICD-10-CM

## 2024-10-21 DIAGNOSIS — Z80.0 FAMILY HISTORY OF PANCREATIC CANCER: Primary | ICD-10-CM

## 2025-01-14 ENCOUNTER — OFFICE VISIT (OUTPATIENT)
Dept: DERMATOLOGY | Facility: CLINIC | Age: 40
End: 2025-01-14
Attending: DERMATOLOGY
Payer: COMMERCIAL

## 2025-01-14 DIAGNOSIS — D22.9 MULTIPLE NEVI: ICD-10-CM

## 2025-01-14 DIAGNOSIS — Z15.09 BRCA1 GENE MUTATION POSITIVE: Primary | ICD-10-CM

## 2025-01-14 DIAGNOSIS — Z15.01 BRCA1 GENE MUTATION POSITIVE: Primary | ICD-10-CM

## 2025-01-14 ASSESSMENT — PAIN SCALES - GENERAL: PAINLEVEL_OUTOF10: NO PAIN (0)

## 2025-01-14 NOTE — PROGRESS NOTES
[facility] Dermatology Note  Encounter Date: Jan 14, 2025  Office Visit     Dermatology Problem List:  1. Hx of atypical nevus  - R lateral abdomen, compound dysplastic nev w mod atypia s/p bx removal 11/29/2023  2. BRCA 1(+) s/p prophylactic b/l mastectomy, and TLHBSOO  3. Lesion to monitor  - L breast, 5 x 5 mm spoke wheel distribution. Photo taken 11/29/22. Stable on recheck  4. Nevus Sebaceous, L temple within hair growing area  - photos taken 1/10/2023, measurement ~ 1.5 cm in length    ____________________________________________    Assessment & Plan:    # Nevus Sebaceous  Pt mentions lesion on the L temple that has been present for a long time, estimates ~ 10 years. Occasionally pruritic. Overall unchanging and stable from photographs. Exam shows .6 x 1.5 cm orangish-yellow globular plaque on L temple without ulceration, telangiectasias, or brown pigment. Reviewed etiology and management options.   - Recommend monitoring this lesion   - Low threshold to bx any new growths or areas of ulceration within lesion     # Hx of atypical nevus  # BRCA 1 mutation  Nothing concerning found on today's exam.   - Continue annual exams.     # Numerous benign nevi  - She does have numerous irregular nevi, but all have symmetric pigment pattern on dermoscopy and stable from previous photos.   - ABCDEs: Counseled ABCDEs of melanoma: Asymmetry, Border (irregularity), Color (not uniform, changes in color), Diameter (greater than 6 mm which is about the size of a pencil eraser), and Evolving (any changes in preexisting moles).     #Lesion to monitor, L breast  She does have a 5 x 5 mm brown macule with irregular, spokewheel like borders on the left breast. Unchanged for 10-20 years.   - compared to 2022 photos, stable and uncahnged     Procedures Performed:   none        Follow-up: one year for [facility]    Staff and Medical Student:     Lisette Edward, MS4    Dr. Jennifer Stover  I was present with the medical  student who participated in the service and in the documentation of the note. I have verified the history and personally performed the physical exam and medical decision making. I agree with the assessment and plan of care as documented in the note.  Jennifer Stover MD   ____________________________________________    CC: No chief complaint on file.    HPI:  Ms. Celeste Arguello is a(n) 39 year old female who presents today as a return patient for annual skin check. She has been monitoring two previous areas of concern at her L temporal scalp and her L breast. She has not noticed any changes to those areas. No concerns at this time.     Patient is otherwise feeling well, without additional skin concerns.    Labs:  None reviewed.    Physical Exam:  Vitals: LMP 2016   SKIN: Full skin, which includes the head/face, both arms, chest, back, abdomen,both legs, groin buttocks, digits and/or nails, was examined.  - 0.6 x 1.5 cm orangish-yellow globular plaque on L temple without ulceration, telangiectasias, or brown pigment.  - On the left breast, there is a 5 x 5 mm medium brown macule with regular pigment network and irregular spokewheel like border, lesion is symmetric  - Multiple brown pigmented macules, some with slightly irregular border, all with regular pigment pattern, are identified on the stomach and back  - No other lesions of concern on areas examined.     Medications:  Current Outpatient Medications   Medication Sig Dispense Refill    citalopram (CELEXA) 20 MG tablet Take 0.5 tablets (10 mg) by mouth daily for 14 days, THEN 1 tablet (20 mg) daily. 90 tablet 3    estradiol (ESTRACE) 0.5 MG tablet Take 1 tablet (0.5 mg) by mouth daily 90 tablet 3     No current facility-administered medications for this visit.      Past Medical History:   Patient Active Problem List   Diagnosis    Mother  of ovarian cancer, age 47    Vitamin d deficiency 23    Female genital symptoms    Encounter for routine  gynecological examination    Endometriosis    BRCA1 gene mutation positive    Pelvic pain in female    BRCA gene mutation positive    Acquired absence of both breasts and nipples    Surgical menopause    Adjustment disorder with anxious mood     Past Medical History:   Diagnosis Date    BRCA1 gene mutation positive 6/15/2018    BRCA1 c.181T>G (p.C61G) tested at [facility] 5/9/2018    Vertigo, benign paroxysmal, bilateral         CC Jennifer Stover MD  [address]  [place] [address] on close of this encounter.

## 2025-01-14 NOTE — NURSING NOTE
Dermatology Rooming Note    Celeste Arguello's goals for this visit include:   Chief Complaint   Patient presents with    Skin Check     FBSE- No areas of concern.      Adam Moore, EMT  Clinic Support  [facility]     [phone]    Employed by [facility]

## 2025-01-14 NOTE — LETTER
1/14/2025       RE: Celeste Arguello  [address]  [place] [address]     Dear Colleague,    Thank you for referring your patient, Celeste Arguello, to the [facility] DERMATOLOGY CLINIC [place] at [facility]. Please see a copy of my visit note below.    [facility] Dermatology Note  Encounter Date: Jan 14, 2025  Office Visit     Dermatology Problem List:  1. Hx of atypical nevus  - R lateral abdomen, compound dysplastic nev w mod atypia s/p bx removal 11/29/2023  2. BRCA 1(+) s/p prophylactic b/l mastectomy, and TLHBSOO  3. Lesion to monitor  - L breast, 5 x 5 mm spoke wheel distribution. Photo taken 11/29/22. Stable on recheck  4. Nevus Sebaceous, L temple within hair growing area  - photos taken 1/10/2023, measurement ~ 1.5 cm in length    ____________________________________________    Assessment & Plan:    # Nevus Sebaceous  Pt mentions lesion on the L temple that has been present for a long time, estimates ~ 10 years. Occasionally pruritic. Overall unchanging and stable from photographs. Exam shows .6 x 1.5 cm orangish-yellow globular plaque on L temple without ulceration, telangiectasias, or brown pigment. Reviewed etiology and management options.   - Recommend monitoring this lesion   - Low threshold to bx any new growths or areas of ulceration within lesion     # Hx of atypical nevus  # BRCA 1 mutation  Nothing concerning found on today's exam.   - Continue annual exams.     # Numerous benign nevi  - She does have numerous irregular nevi, but all have symmetric pigment pattern on dermoscopy and stable from previous photos.   - ABCDEs: Counseled ABCDEs of melanoma: Asymmetry, Border (irregularity), Color (not uniform, changes in color), Diameter (greater than 6 mm which is about the size of a pencil eraser), and Evolving (any changes in preexisting moles).     #Lesion to monitor, L breast  She does have a 5 x 5 mm  brown macule with irregular, spokewheel like borders on the left breast. Unchanged for 10-20 years.   - compared to 2022 photos, stable and uncahnged     Procedures Performed:   none        Follow-up: one year for [facility]    Staff and Medical Student:     Lisette Edward, MS4    Dr. Jennifer Stover  I was present with the medical student who participated in the service and in the documentation of the note. I have verified the history and personally performed the physical exam and medical decision making. I agree with the assessment and plan of care as documented in the note.  Jennifer Stover MD   ____________________________________________    CC: No chief complaint on file.    HPI:  Ms. Celeste Arguello is a(n) 39 year old female who presents today as a return patient for annual skin check. She has been monitoring two previous areas of concern at her L temporal scalp and her L breast. She has not noticed any changes to those areas. No concerns at this time.     Patient is otherwise feeling well, without additional skin concerns.    Labs:  None reviewed.    Physical Exam:  Vitals: LMP 12/17/2016   SKIN: Full skin, which includes the head/face, both arms, chest, back, abdomen,both legs, groin buttocks, digits and/or nails, was examined.  - 0.6 x 1.5 cm orangish-yellow globular plaque on L temple without ulceration, telangiectasias, or brown pigment.  - On the left breast, there is a 5 x 5 mm medium brown macule with regular pigment network and irregular spokewheel like border, lesion is symmetric  - Multiple brown pigmented macules, some with slightly irregular border, all with regular pigment pattern, are identified on the stomach and back  - No other lesions of concern on areas examined.     Medications:  Current Outpatient Medications   Medication Sig Dispense Refill     citalopram (CELEXA) 20 MG tablet Take 0.5 tablets (10 mg) by mouth daily for 14 days, THEN 1 tablet (20 mg) daily. 90 tablet 3     estradiol  (ESTRACE) 0.5 MG tablet Take 1 tablet (0.5 mg) by mouth daily 90 tablet 3     No current facility-administered medications for this visit.      Past Medical History:   Patient Active Problem List   Diagnosis     Mother  of ovarian cancer, age 47     Vitamin d deficiency 23     Female genital symptoms     Encounter for routine gynecological examination     Endometriosis     BRCA1 gene mutation positive     Pelvic pain in female     BRCA gene mutation positive     Acquired absence of both breasts and nipples     Surgical menopause     Adjustment disorder with anxious mood     Past Medical History:   Diagnosis Date     BRCA1 gene mutation positive 6/15/2018    BRCA1 c.181T>G (p.C61G) tested at [facility] 2018     Vertigo, benign paroxysmal, bilateral         CC Jennifer Stover MD  [address]  [place] [address] on close of this encounter.       Again, thank you for allowing me to participate in the care of your patient.      Sincerely,    Jennifer Stover MD

## 2025-02-26 ENCOUNTER — PATIENT OUTREACH (OUTPATIENT)
Dept: GASTROENTEROLOGY | Facility: CLINIC | Age: 40
End: 2025-02-26
Payer: COMMERCIAL

## 2025-02-26 DIAGNOSIS — R10.84 ABDOMINAL PAIN, GENERALIZED: Primary | ICD-10-CM

## 2025-02-26 NOTE — TELEPHONE ENCOUNTER
Pt of Dr. Harris  BRCA 1 carrier, following annually with MRCP, last in October 2024.Pt post hysterectomy/bilateral mastectomy     for the last 4-5 days: new lower abdominal pain/pelvic pain on right side to right back, strange stools/mucosy not loose or more frequent doesn't feel like she's emptying, more fatigued than usual. Little nausea in the morning that's new.         Update sent to Dr. Harris for recommendations on follow up    ML

## 2025-02-27 NOTE — TELEPHONE ENCOUNTER
Per Dr. Harris  Agree this does not sound pancreatic in nature   General GI [facility]     Discussed plan, orders placed    ML

## 2025-03-20 ENCOUNTER — TELEPHONE (OUTPATIENT)
Dept: DERMATOLOGY | Facility: CLINIC | Age: 40
End: 2025-03-20
Payer: COMMERCIAL

## 2025-03-20 NOTE — TELEPHONE ENCOUNTER
3/20 Sent [employer]xt (1st Attempt) for the patient to call back and schedule the following:    Appointment type: Return Dermatology  Provider: Jolanta  Return date: 1/14/2026  Specialty phone number: [phone]  Additional appointment(s) needed: N/A  Additonal Notes: N.A

## 2025-03-27 ENCOUNTER — OFFICE VISIT (OUTPATIENT)
Dept: GASTROENTEROLOGY | Facility: CLINIC | Age: 40
End: 2025-03-27
Attending: INTERNAL MEDICINE
Payer: COMMERCIAL

## 2025-03-27 VITALS
OXYGEN SATURATION: 98 % | HEIGHT: 61 IN | SYSTOLIC BLOOD PRESSURE: 117 MMHG | WEIGHT: 125.9 LBS | DIASTOLIC BLOOD PRESSURE: 84 MMHG | BODY MASS INDEX: 23.77 KG/M2 | HEART RATE: 78 BPM

## 2025-03-27 DIAGNOSIS — R10.84 ABDOMINAL PAIN, GENERALIZED: ICD-10-CM

## 2025-03-27 ASSESSMENT — PAIN SCALES - GENERAL: PAINLEVEL_OUTOF10: NO PAIN (0)

## 2025-03-27 NOTE — LETTER
3/27/2025      Celeste Arguello  [address]  [place] [address]      Dear Colleague,    Thank you for referring your patient, Celeste Arguello, to the [facility]. Please see a copy of my visit note below.    NEW PATIENT GI CLINIC VISIT    CC/REFERRING MD:  Juice Harris  REASON FOR CONSULTATION:   Juice Harris for   Chief Complaint   Patient presents with     New Patient     New consult for abdominal pain, mucus in stool, and constipation.       ASSESSMENT/PLAN: Patient is here for evaluation following recent spell of right lower quadrant abdominal pain with associated mucus and normal-appearing stool and associated nausea.  The spell lasted about 10 days and resolved spontaneously without any treatment.  No other specific alarm features to this episode.  We reviewed some potential etiologies.  She may have had a brief spell of constipation that led to some lower abdominal pain and increased stool mucus.  She may have had a mild infection/enteritis that led to the symptoms.  At this time, she has a benign exam and is otherwise feeling well, so I am in favor of having her monitor for any recurrence or continuation of symptoms.  She will notify us if symptoms change at all and we can pursue workup, which would likely include some laboratory testing, stool testing, and perhaps some imaging, depending on the severity of her pain and other symptoms.  She was happy with this plan and will follow-up with us as needed.    1. Abdominal pain, generalized  - Adult GI  Referral - Consult Only        RTC PRN    Thank you for this consultation.  It was a pleasure to participate in the care of this patient; please contact us with any further questions.      23 minutes spent on the date of the encounter doing chart review, patient visit, and documentation    This note was created with voice recognition software, and while reviewed for accuracy, typos may remain.      Rangel Antonio PA-C  Division of Gastroenterology, Hepatology and Nutrition  [facility]  Celeste Arguello is a 40 year old female with a past medical history significant for endometriosis and BRCA1 gene mutation positive status post and MEHRDAD/BSO that is seen as a new patient in the GI clinic today for recent spell of right lower quadrant abdominal pain.  Patient recalls about a month ago having sudden onset of dull aching pain in the right lower quadrant of the abdomen persistent all day, not waxing or waning.  This lasted about 10 days.  There was associated nausea without vomiting.  She continued to have formed stool, not different from previous, described as [place] stool chart type III usually once daily.  She was passing large amounts of mucus but no blood in the stool.  After 10 days, this resolved relatively quickly without any treatments.  She has felt well since then.  Thinking back on this episode, there was no specific dietary or medication changes.  No activities, recent travel, antibiotic exposures, or known sick contacts.  Thinking about the pain, it was not worse or relieved by passage of stool.  It was not worse or better with any certain foods or activities/movement.  She has not had pain like that before.    There is no pertinent family medical history for colon cancer or diverticulitis.  She endorses vaping occasionally, very rare alcohol, no drug use.  Has not had EGD or colonoscopy before.  Of note, she does follow with our advanced endoscopy team due to a family history of pancreatic cancer and her gene mutation positivity, undergoing MRCP regularly.  Last was in October 2024 and was without any specific GI abnormalities.    ROS:    10 point ROS neg other than the symptoms noted above in the HPI.    PREVIOUS ENDOSCOPY:  None    PERTINENT RELEVANT IMAGING OR LABS:  No recent laboratory testing    ALLERGIES:     Allergies   Allergen  Reactions     Chlorhexidine Gluconate Rash       PERTINENT MEDICATIONS:    Current Outpatient Medications:      estradiol (ESTRACE) 0.5 MG tablet, Take 1 tablet (0.5 mg) by mouth daily, Disp: 90 tablet, Rfl: 3    PROBLEM LIST  Patient Active Problem List    Diagnosis Date Noted     Surgical menopause 2022     Priority: Medium     Adjustment disorder with anxious mood 2022     Priority: Medium     BRCA gene mutation positive 2019     Priority: Medium     Acquired absence of both breasts and nipples 2019     Priority: Medium     BRCA1 gene mutation positive 06/15/2018     Priority: Medium     BRCA1 c.181T>G (p.C61G)  tested at [facility] 2018       Endometriosis 2015     Priority: Medium     Encounter for routine gynecological examination 2014     Priority: Medium     : Pap-> NIL  : PAP-> NIL  Problem list name updated by automated process. Provider to review       Vitamin d deficiency 23 2012     Priority: Medium     Vitamin D3 5000 iu supplement until able to get daily 15' sun to skin this summer. Consider level recheck.       Female genital symptoms 2012     Priority: Medium     2014: Restart SSRI  12: Controlled on Celexa.  Problem list name updated by automated process. Provider to review       Mother  of ovarian cancer, age 47 2012     Priority: Medium     16: Referred to GYN/ONC for genetic counseling/risk eval  MA + gene for ovarian and breast cancer --> elective surgery       Pelvic pain in female 2012     Priority: Medium       PERTINENT PAST MEDICAL HISTORY:  Past Medical History:   Diagnosis Date     BRCA1 gene mutation positive 6/15/2018    BRCA1 c.181T>G (p.C61G) tested at [facility] 2018     Vertigo, benign paroxysmal, bilateral        PREVIOUS SURGERIES:  Past Surgical History:   Procedure Laterality Date     CYSTOSCOPY N/A 2018    Procedure: CYSTOSCOPY;;  Surgeon: Marybeth Grande MD;  Location:  OR      ENT SURGERY      tonsillectomy     HYSTERECTOMY TOTAL ABDOMINAL, BILATERAL SALPINGO-OOPHORECTOMY, NODE DISSECTION, COMBINED Bilateral 2018    Procedure: COMBINED HYSTERECTOMY TOTAL ABDOMINAL, SALPINGO-OOPHORECTOMY, NODE DISSECTION;;  Surgeon: Marybeth Grande MD;  Location: [facility] OR     INSERT TISSUE EXPANDER BREAST BILATERAL Bilateral 2019    Procedure: BILATERAL TISSUE EXPANDERS (GILDARDO);  Surgeon: Emily Gee MD;  Location: [facility] OR     LAPAROSCOPIC HYSTERECTOMY TOTAL, BILATERAL SALPINGO-OOPHORECTOMY, NODE DISSECTION, COMBINED N/A 2018    Procedure: COMBINED LAPAROSCOPIC HYSTERECTOMY TOTAL, SALPINGO-OOPHORECTOMY, NODE DISSECTION;  Laparoscopic Removal Of Uterus, Cervix, Both Ovaries And Fallopian Tubes, Cystoscopy ;  Surgeon: Marybeth Grande MD;  Location: [facility] OR     LAPAROSCOPY DIAGNOSTIC (GYN)  2006    endometriosis     MASTECTOMY SIMPLE BILATERAL Bilateral 2019    Procedure: BILATERAL PROPHYLACTIC MASTECTOMY (JUAN);  Surgeon: Roseann Boyd MD;  Location: [facility] OR     urethral reimplant  age 6       SOCIAL HISTORY:  Social History     Socioeconomic History     Marital status: Single     Spouse name: NA     Number of children: 1     Years of education: Not on file     Highest education level: Not on file   Occupational History     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 Not Asked   Social History Narrative    How much  exercise per week? One    How much calcium per day? Dairy products       How much caffeine per day? 1 cup coffee and 2 can of coke    How much vitamin D per day? None    Do you/your family wear seatbelts?  Yes    Do you/your family use safety helmets? yes    Do you/your family use sunscreen? Yes    Do you/your family keep firearms in the home? No    Do you/your family have a smoke detector(s)? Yes        Do you feel safe in your home? Yes    Has anyone ever touched you in an unwanted manner? No     Explain     SEE GLADYS [facility]     2014    Beronica Dumas [facility] 19         Social Drivers of Health     Financial Resource Strain: Not on file   Food Insecurity: Not on file   Transportation Needs: Not on file   Physical Activity: Not on file   Stress: Not on file   Social Connections: Unknown (3/17/2022)    [facility] [employer] [facility]    Social Connections      Frequency of Communication with Friends and Family: Not on file   Interpersonal Safety: Not on file   Housing Stability: Not on file       FAMILY HISTORY:  Family History   Problem Relation Age of Onset     Ovarian Cancer Mother 38         age 47     Multiple Sclerosis Father      Alcohol/Drug Brother         active     Alcohol/Drug Brother 41        cause of death     Psychotic Disorder Brother         depression and ADHD     Hereditary Breast and Ovarian Cancer Syndrome Maternal Aunt 50        Breast cancer gene -- MEHRDAD/BSO and mastectomy     Breast Cancer Maternal Aunt 35        & MC age 36     Uterine Cancer Maternal Aunt      Pancreatic Cancer Maternal Uncle 38     Cerebral aneurysm Maternal Cousin      C.A.D. No family hx of      Diabetes No family hx of      Hypertension No family hx of      Coronary Artery Disease No family hx of      Cerebrovascular Disease No family hx of      Hyperlipidemia No family hx of      Colon Cancer No family hx of      Prostate Cancer No  "family hx of      Thyroid Disease No family hx of      Skin Cancer No family hx of      Melanoma No family hx of        Past/family/social history reviewed and no changes    PHYSICAL EXAMINATION:  Constitutional: aaox3, cooperative, pleasant, not dyspneic/diaphoretic, no acute distress  Vitals reviewed: /84 (BP Location: Left arm, Patient Position: Sitting, Cuff Size: Adult Regular)   Pulse 78   Ht 1.549 m (5' 1\")   Wt 57.1 kg (125 lb 14.4 oz)   LMP 12/17/2016   SpO2 98%   BMI 23.79 kg/m    Wt:   Wt Readings from Last 2 Encounters:   03/27/25 57.1 kg (125 lb 14.4 oz)   06/26/24 53.5 kg (118 lb)      Eyes: Sclera anicteric/injected  CV: No edema  Respiratory: Unlabored breathing  Abd: Nondistended, +bs, no hepatosplenomegaly, nontender, no peritoneal signs  Skin: warm, perfused, no jaundice  Psych: Normal affect  MSK: Normal gait                      Again, thank you for allowing me to participate in the care of your patient.        Sincerely,        Rangel Antonio PA-C    Electronically signed"

## 2025-03-27 NOTE — PROGRESS NOTES
NEW PATIENT GI CLINIC VISIT    CC/REFERRING MD:  Juice Harris  REASON FOR CONSULTATION:   Juice Harris for   Chief Complaint   Patient presents with    New Patient     New consult for abdominal pain, mucus in stool, and constipation.       ASSESSMENT/PLAN: Patient is here for evaluation following recent spell of right lower quadrant abdominal pain with associated mucus and normal-appearing stool and associated nausea.  The spell lasted about 10 days and resolved spontaneously without any treatment.  No other specific alarm features to this episode.  We reviewed some potential etiologies.  She may have had a brief spell of constipation that led to some lower abdominal pain and increased stool mucus.  She may have had a mild infection/enteritis that led to the symptoms.  At this time, she has a benign exam and is otherwise feeling well, so I am in favor of having her monitor for any recurrence or continuation of symptoms.  She will notify us if symptoms change at all and we can pursue workup, which would likely include some laboratory testing, stool testing, and perhaps some imaging, depending on the severity of her pain and other symptoms.  She was happy with this plan and will follow-up with us as needed.    1. Abdominal pain, generalized  - Adult GI  Referral - Consult Only        RTC PRN    Thank you for this consultation.  It was a pleasure to participate in the care of this patient; please contact us with any further questions.      23 minutes spent on the date of the encounter doing chart review, patient visit, and documentation    This note was created with voice recognition software, and while reviewed for accuracy, typos may remain.     Rangel Antonio PA-C  Division of Gastroenterology, Hepatology and Nutrition  [facility]  Celesteдмитрий Arguello is a 40 year old female with a past medical history significant for endometriosis and BRCA1  gene mutation positive status post and MEHRDAD/BSO that is seen as a new patient in the GI clinic today for recent spell of right lower quadrant abdominal pain.  Patient recalls about a month ago having sudden onset of dull aching pain in the right lower quadrant of the abdomen persistent all day, not waxing or waning.  This lasted about 10 days.  There was associated nausea without vomiting.  She continued to have formed stool, not different from previous, described as Niles stool chart type III usually once daily.  She was passing large amounts of mucus but no blood in the stool.  After 10 days, this resolved relatively quickly without any treatments.  She has felt well since then.  Thinking back on this episode, there was no specific dietary or medication changes.  No activities, recent travel, antibiotic exposures, or known sick contacts.  Thinking about the pain, it was not worse or relieved by passage of stool.  It was not worse or better with any certain foods or activities/movement.  She has not had pain like that before.    There is no pertinent family medical history for colon cancer or diverticulitis.  She endorses vaping occasionally, very rare alcohol, no drug use.  Has not had EGD or colonoscopy before.  Of note, she does follow with our advanced endoscopy team due to a family history of pancreatic cancer and her gene mutation positivity, undergoing MRCP regularly.  Last was in October 2024 and was without any specific GI abnormalities.    ROS:    10 point ROS neg other than the symptoms noted above in the HPI.    PREVIOUS ENDOSCOPY:  None    PERTINENT RELEVANT IMAGING OR LABS:  No recent laboratory testing    ALLERGIES:     Allergies   Allergen Reactions    Chlorhexidine Gluconate Rash       PERTINENT MEDICATIONS:    Current Outpatient Medications:     estradiol (ESTRACE) 0.5 MG tablet, Take 1 tablet (0.5 mg) by mouth daily, Disp: 90 tablet, Rfl: 3    PROBLEM LIST  Patient Active Problem List     Diagnosis Date Noted    Surgical menopause 2022     Priority: Medium    Adjustment disorder with anxious mood 2022     Priority: Medium    BRCA gene mutation positive 2019     Priority: Medium    Acquired absence of both breasts and nipples 2019     Priority: Medium    BRCA1 gene mutation positive 06/15/2018     Priority: Medium     BRCA1 c.181T>G (p.C61G)  tested at [facility] 2018      Endometriosis 2015     Priority: Medium    Encounter for routine gynecological examination 2014     Priority: Medium     : Pap-> NIL  2009: PAP-> NIL  Problem list name updated by automated process. Provider to review      Vitamin d deficiency 23 2012     Priority: Medium     Vitamin D3 5000 iu supplement until able to get daily 15' sun to skin this summer. Consider level recheck.      Female genital symptoms 2012     Priority: Medium     2014: Restart SSRI  12: Controlled on Celexa.  Problem list name updated by automated process. Provider to review      Mother  of ovarian cancer, age 47 2012     Priority: Medium     16: Referred to GYN/ONC for genetic counseling/risk eval  MA + gene for ovarian and breast cancer --> elective surgery      Pelvic pain in female 2012     Priority: Medium       PERTINENT PAST MEDICAL HISTORY:  Past Medical History:   Diagnosis Date    BRCA1 gene mutation positive 6/15/2018    BRCA1 c.181T>G (p.C61G) tested at [facility] 2018    Vertigo, benign paroxysmal, bilateral        PREVIOUS SURGERIES:  Past Surgical History:   Procedure Laterality Date    CYSTOSCOPY N/A 2018    Procedure: CYSTOSCOPY;;  Surgeon: Marybeth Grande MD;  Location: [facility] OR    ENT SURGERY      tonsillectomy    HYSTERECTOMY TOTAL ABDOMINAL, BILATERAL SALPINGO-OOPHORECTOMY, NODE DISSECTION, COMBINED Bilateral 2018    Procedure: COMBINED HYSTERECTOMY TOTAL ABDOMINAL, SALPINGO-OOPHORECTOMY, NODE DISSECTION;;  Surgeon: Marybeth Grande  MD Wagner;  Location: [facility] OR    INSERT TISSUE EXPANDER BREAST BILATERAL Bilateral 2019    Procedure: BILATERAL TISSUE EXPANDERS (GILDARDO);  Surgeon: Emily Gee MD;  Location: [facility] OR    LAPAROSCOPIC HYSTERECTOMY TOTAL, BILATERAL SALPINGO-OOPHORECTOMY, NODE DISSECTION, COMBINED N/A 2018    Procedure: COMBINED LAPAROSCOPIC HYSTERECTOMY TOTAL, SALPINGO-OOPHORECTOMY, NODE DISSECTION;  Laparoscopic Removal Of Uterus, Cervix, Both Ovaries And Fallopian Tubes, Cystoscopy ;  Surgeon: Marybeth Grande MD;  Location: [facility] OR    LAPAROSCOPY DIAGNOSTIC (GYN)  2006    endometriosis    MASTECTOMY SIMPLE BILATERAL Bilateral 2019    Procedure: BILATERAL PROPHYLACTIC MASTECTOMY (JUAN);  Surgeon: Roseann Boyd MD;  Location:  OR    urethral reimplant  age 6       SOCIAL HISTORY:  Social History     Socioeconomic History    Marital status: Single     Spouse name: NA    Number of children: 1    Years of education: Not on file    Highest education level: Not on file   Occupational History    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 Not Asked   Social History Narrative    How much exercise per week? One    How much calcium per day? Dairy products       How much caffeine per day? 1 cup coffee and 2 can of coke    How much vitamin D per day? None    Do you/your family wear seatbelts?  Yes    Do you/your family use safety helmets? yes    Do you/your family use sunscreen?  Yes    Do you/your family keep firearms in the home? No    Do you/your family have a smoke detector(s)? Yes        Do you feel safe in your home? Yes    Has anyone ever touched you in an unwanted manner? No     Explain     SEE GLADYS [facility]     2014    Beronica Dumas [facility] 19         Social Drivers of Health     Financial Resource Strain: Not on file   Food Insecurity: Not on file   Transportation Needs: Not on file   Physical Activity: Not on file   Stress: Not on file   Social Connections: Unknown (3/17/2022)    Received from [facility]    Social Connections     Frequency of Communication with Friends and Family: Not on file   Interpersonal Safety: Not on file   Housing Stability: Not on file       FAMILY HISTORY:  Family History   Problem Relation Age of Onset    Ovarian Cancer Mother 38         age 47    Multiple Sclerosis Father     Alcohol/Drug Brother         active    Alcohol/Drug Brother 41        cause of death    Psychotic Disorder Brother         depression and ADHD    Hereditary Breast and Ovarian Cancer Syndrome Maternal Aunt 50        Breast cancer gene -- MEHRDAD/BSO and mastectomy    Breast Cancer Maternal Aunt 35        & MC age 36    Uterine Cancer Maternal Aunt     Pancreatic Cancer Maternal Uncle 38    Cerebral aneurysm Maternal Cousin     C.A.D. No family hx of     Diabetes No family hx of     Hypertension No family hx of     Coronary Artery Disease No family hx of     Cerebrovascular Disease No family hx of     Hyperlipidemia No family hx of     Colon Cancer No family hx of     Prostate Cancer No family hx of     Thyroid Disease No family hx of     Skin Cancer No family hx of     Melanoma No family hx of        Past/family/social history reviewed and no changes    PHYSICAL EXAMINATION:  Constitutional: aaox3, cooperative, pleasant, not dyspneic/diaphoretic, no acute distress  Vitals reviewed: /84  "(BP Location: Left arm, Patient Position: Sitting, Cuff Size: Adult Regular)   Pulse 78   Ht 1.549 m (5' 1\")   Wt 57.1 kg (125 lb 14.4 oz)   LMP 12/17/2016   SpO2 98%   BMI 23.79 kg/m    Wt:   Wt Readings from Last 2 Encounters:   03/27/25 57.1 kg (125 lb 14.4 oz)   06/26/24 53.5 kg (118 lb)      Eyes: Sclera anicteric/injected  CV: No edema  Respiratory: Unlabored breathing  Abd: Nondistended, +bs, no hepatosplenomegaly, nontender, no peritoneal signs  Skin: warm, perfused, no jaundice  Psych: Normal affect  MSK: Normal gait                    "

## 2025-03-27 NOTE — NURSING NOTE
"Chief Complaint   Patient presents with    New Patient     New consult for abdominal pain, mucus in stool, and constipation.     She requests these members of her care team be copied on today's visit information:  Referring Provider:  Juice Harris MD  [address] [place], [place] [address]  [place] [address]    Vitals:    03/27/25 0744   BP: 117/84   BP Location: Left arm   Patient Position: Sitting   Cuff Size: Adult Regular   Pulse: 78   SpO2: 98%   Weight: 57.1 kg (125 lb 14.4 oz)   Height: 1.549 m (5' 1\")     Body mass index is 23.79 kg/m .    Do you have a history of colon cancer in your immediate family? NO    Medications were reconciled.        Dena Gutierrez CMA        "

## 2025-06-12 NOTE — PROGRESS NOTES
Oncology Risk Management Consultation:  Date on this visit: 2025    Celeste Arguello requires high risk screening and surveillance to reduce her risk of cancer secondary to Hereditary Breast and Ovarian Cancer Syndrome, linked to a BRCA1 mutation.  She is considered to be at high risk for hereditary breast and ovarian cancer. She has mitigated her risk by having a total laparoscopic hysterectomy and bilateral salpingo oophorectomy in 2018 as well as a risk reducing bilateral mastectomy in 2019.      Primary Physician: No Ref-Primary, Physician     History Of Present Illness:  Ms. Arguello is a very pleasant 40 year old female who presents with a family history of breast and ovarian cancer and a confirmed BRCA1 mutation.     Genetic Testin2018 - POSITIVE for a BRCA1 mutation. Specifically her mutation is called c.181T>G (p.C61G) identified using [employer] testing from [employer]. This testing was done because of her family history of ovarian and breast cancer.       Of note, Celeste tested negative for mutations in the following genes by sequencing and deletion/duplication analysis: ALEM, BARD1, BRCA1, BRCA2, BRIP1, CDH1, CHEK2, DICER1, EPCAM, MLH1, MRE11A, MSH2, MSH6, MUTYH, NBN, NF1, PALB2, PMS2, PTEN, RAD50, RAD51C, RAD51D, SMARCA4, STK11, and TP53 genes.     Pertinent History:   2018 - [facility] (Dr. MCKENZIE SANDERSON):  FINAL DIAGNOSIS:   A. IUD, REMOVAL:   - Intrauterine device identified (gross only)   B. UTERUS, CERVIX, BILATERAL FALLOPIAN TUBES AND OVARIES, TOTAL   LAPAROSCOPIC HYSTERECTOMY AND BILATERAL   SALPINGO-OOPHORECTOMY:   - Endometrium with inactive glands and decidualized stroma consistent with    exogenous progesterone effect   - Myometrium with no significant histologic findings   - Cervix with no significant histologic findings   - Benign unremarkable right ovary   - Left ovary with corpus luteum cyst and adhesions   - Right fallopian tube with paratubal cysts and  Walthard's rests   - Left fallopian tube with no significant histologic findings      Hx of estradiol PO 0.5 mg daily post risk reducing salpingo oophorectomy     2019: RRM with Dr. Boyd and Dr. Gee -   FINAL DIAGNOSIS:   A: Breast, left, prophylactic mastectomy: Negative for malignancy.   B: Breast, right, prophylactic mastectomy: Negative for malignancy.     Pertinent Screening History:  Dermatology:  2018- Dermatology check (Dr. Fracisco Spencer) - all benign findings.  2022- Dermatology:  Excision of right lower leg, path, inflamed hypertrophic seborrheic keratosis. Negative for malignancy.   2022- Dermatology check (Dr. Jennifer Stover),    # Neoplasm of uncertain behavior, right abdomen  DDX: dysplastic nevus, rule out melanoma   - Shave biopsy:   # Numerous benign nevi  - She does have numerous irregular nevi, but all have symmetric pigment pattern on dermoscopy  #Lesion to monitor, L breast- 6 mm brown macule with irregular, spokewheel like borders on the left breast. Unchanged for 10-20 years.   1/10/2024: Dermatology check (Dr. Jennifer Stover),  all benign findings  2025:  Dermatology check (Dr. Jennifer Stover),  all benign findings     Pancreatic Screenin2022- MRCP: Unremarkable appearance of the pancreas and biliary tract. No acute or suspicious findings on today's exam.  10/5/2023: MRCP: No significant findings MRI abdomen. Nothing for malignancy. Normal pancreas.  10/18/2024: MRCP, Normal appearance of the pancreas and biliary tract. No acute or suspicious findings.    Past Medical/Surgical History:  Past Medical History:   Diagnosis Date    BRCA1 gene mutation positive 6/15/2018    BRCA1 c.181T>G (p.C61G) tested at [facility] 2018    Vertigo, benign paroxysmal, bilateral      Past Surgical History:   Procedure Laterality Date    CYSTOSCOPY N/A 2018    Procedure: CYSTOSCOPY;;  Surgeon: Marybeth Grande MD;  Location: [facility] OR    ENT SURGERY       tonsillectomy    HYSTERECTOMY TOTAL ABDOMINAL, BILATERAL SALPINGO-OOPHORECTOMY, NODE DISSECTION, COMBINED Bilateral 2018    Procedure: COMBINED HYSTERECTOMY TOTAL ABDOMINAL, SALPINGO-OOPHORECTOMY, NODE DISSECTION;;  Surgeon: Marybeth Grande MD;  Location[facility] OR    INSERT TISSUE EXPANDER BREAST BILATERAL Bilateral 2019    Procedure: BILATERAL TISSUE EXPANDERS (GILDARDO);  Surgeon: Emily Gee MD;  Location: [facility] OR    LAPAROSCOPIC HYSTERECTOMY TOTAL, BILATERAL SALPINGO-OOPHORECTOMY, NODE DISSECTION, COMBINED N/A 2018    Procedure: COMBINED LAPAROSCOPIC HYSTERECTOMY TOTAL, SALPINGO-OOPHORECTOMY, NODE DISSECTION;  Laparoscopic Removal Of Uterus, Cervix, Both Ovaries And Fallopian Tubes, Cystoscopy ;  Surgeon: Marybeth Grande MD;  Location: [facility] OR    LAPAROSCOPY DIAGNOSTIC (GYN)  2006    endometriosis    MASTECTOMY SIMPLE BILATERAL Bilateral 2019    Procedure: BILATERAL PROPHYLACTIC MASTECTOMY (JUAN);  Surgeon: Roseann Boyd MD;  Location: [facility] OR    urethral reimplant  age 6       Allergies:  Allergies as of 2025 - Reviewed 2025   Allergen Reaction Noted    Chlorhexidine gluconate Rash 2018       Current Medications:  Current Outpatient Medications   Medication Sig Dispense Refill    estradiol (ESTRACE) 0.5 MG tablet Take 1 tablet (0.5 mg) by mouth daily 90 tablet 3        Family History:  Family History   Problem Relation Age of Onset    Ovarian Cancer Mother 38         age 47    Multiple Sclerosis Father     Alcohol/Drug Brother         active    Alcohol/Drug Brother 41        cause of death    Psychotic Disorder Brother         depression and ADHD    Hereditary Breast and Ovarian Cancer Syndrome Maternal Aunt 50        Breast cancer gene -- MEHRDAD/BSO and mastectomy    Breast Cancer Maternal Aunt 35        & MC age 36    Uterine Cancer Maternal Aunt     Pancreatic Cancer Maternal Uncle 38    Cerebral aneurysm Maternal Cousin     C.A.D. No family  hx of     Diabetes No family hx of     Hypertension No family hx of     Coronary Artery Disease No family hx of     Cerebrovascular Disease No family hx of     Hyperlipidemia No family hx of     Colon Cancer No family hx of     Prostate Cancer No family hx of     Thyroid Disease No family hx of     Skin Cancer No family hx of     Melanoma No family hx of        Social History:  Social History     Socioeconomic History    Marital status: Single     Spouse name: NA    Number of children: 1    Years of education: Not on file    Highest education level: Not on file   Occupational History    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 Not Asked   Social History Narrative    How much exercise per week? One    How much calcium per day? Dairy products       How much caffeine per day? 1 cup coffee and 2 can of coke    How much vitamin D per day? None    Do you/your family wear seatbelts?  Yes    Do you/your family use safety helmets? yes    Do you/your family use sunscreen? Yes    Do you/your family keep firearms in the home? No    Do you/your family have a smoke detector(s)? Yes        Do you feel safe in your home? Yes    Has anyone ever touched you in an unwanted manner? No     Explain     SEE GLADSY [facility]     2014    Beronica Dumas [facility] 19         Social Drivers of Health     Financial Resource Strain: Not on file   Food Insecurity: Not on file   Transportation Needs: Not on file    Physical Activity: Not on file   Stress: Not on file   Social Connections: Unknown (3/17/2022)    Received from [facility]    Social Connections     Frequency of Communication with Friends and Family: Not on file   Interpersonal Safety: Not on file   Housing Stability: Not on file       Physical Exam:  [facility] 12/17/2016   GENERAL: alert and no distress  EYES: Eyes grossly normal to inspection.  No discharge or erythema, or obvious scleral/conjunctival abnormalities.  RESP: No audible wheeze, cough, or visible cyanosis.    SKIN: Visible skin clear. No significant rash, abnormal pigmentation or lesions.  NEURO: Cranial nerves grossly intact.  Mentation and speech appropriate for age.  PSYCH: Appropriate affect, tone, and pace of words    ASSESSMENT    Celeste reports that she is doing well at this visit. She has no concerns with her health, and no updates to her family's medical history. She is due for an eye exam, otherwise she is up to date on BRCA1 screenings. She continues to follow with Dr. Harris for pancreatic cancer screening and with Dr. Stover for skin screening. I would like to see her back in one year.     INDIVIDUALIZED CANCER RISK MANAGEMENT PLAN:    Continue pancreatic cancer screening with Dr. Harris, next MRCP due in October, 2025  Continue routine skin and eye exams, next visit with Dr. Stover due in January, 2026  Return to see me in one year        I spent a total of 19 minutes on the day of the visit. Please see the note for further information on patient assessment and treatment.     Kristin Harvey, DARBY, APRN, AGCNS-BC  Clinical Nurse Specialist  Cancer Risk Management Program  [facility]

## 2025-06-16 ENCOUNTER — VIRTUAL VISIT (OUTPATIENT)
Dept: ONCOLOGY | Facility: CLINIC | Age: 40
End: 2025-06-16
Payer: COMMERCIAL

## 2025-06-16 DIAGNOSIS — Z15.01 BRCA1 GENE MUTATION POSITIVE: Primary | ICD-10-CM

## 2025-06-16 DIAGNOSIS — Z15.09 BRCA1 GENE MUTATION POSITIVE: Primary | ICD-10-CM

## 2025-06-16 PROCEDURE — 98004 SYNCH AUDIO-VIDEO EST SF 10: CPT

## 2025-06-16 NOTE — LETTER
2025      Celeste Arguello  [address]  [place] [address]      Dear Colleague,    Thank you for referring your patient, Celeste Arguello, to the [facility]. Please see a copy of my visit note below.    Oncology Risk Management Consultation:  Date on this visit: 2025    Celeste Arguello requires high risk screening and surveillance to reduce her risk of cancer secondary to Hereditary Breast and Ovarian Cancer Syndrome, linked to a BRCA1 mutation.  She is considered to be at high risk for hereditary breast and ovarian cancer. She has mitigated her risk by having a total laparoscopic hysterectomy and bilateral salpingo oophorectomy in 2018 as well as a risk reducing bilateral mastectomy in 2019.      Primary Physician: No Ref-Primary, Physician     History Of Present Illness:  Ms. Arguello is a very pleasant 40 year old female who presents with a family history of breast and ovarian cancer and a confirmed BRCA1 mutation.     Genetic Testin2018 - POSITIVE for a BRCA1 mutation. Specifically her mutation is called c.181T>G (p.C61G) identified using [employer] testing from [employer]. This testing was done because of her family history of ovarian and breast cancer.       Of note, Celeste tested negative for mutations in the following genes by sequencing and deletion/duplication analysis: ALEM, BARD1, BRCA1, BRCA2, BRIP1, CDH1, CHEK2, DICER1, EPCAM, MLH1, MRE11A, MSH2, MSH6, MUTYH, NBN, NF1, PALB2, PMS2, PTEN, RAD50, RAD51C, RAD51D, SMARCA4, STK11, and TP53 genes.     Pertinent History:   [facility]Dr[facility]CK[facility]AND[facility]):  FINAL DIAGNOSIS:   A. IUD, REMOVAL:   - Intrauterine device identified (gross only)   B. UTERUS, CERVIX, BILATERAL FALLOPIAN TUBES AND OVARIES, TOTAL   LAPAROSCOPIC HYSTERECTOMY AND BILATERAL   SALPINGO-OOPHORECTOMY:   - Endometrium with inactive glands and decidualized stroma consistent with    exogenous progesterone  effect   - Myometrium with no significant histologic findings   - Cervix with no significant histologic findings   - Benign unremarkable right ovary   - Left ovary with corpus luteum cyst and adhesions   - Right fallopian tube with paratubal cysts and Walthard's rests   - Left fallopian tube with no significant histologic findings      Hx of estradiol PO 0.5 mg daily post risk reducing salpingo oophorectomy     2019: RRM with Dr. Boyd and Dr. Gee -   FINAL DIAGNOSIS:   A: Breast, left, prophylactic mastectomy: Negative for malignancy.   B: Breast, right, prophylactic mastectomy: Negative for malignancy.     Pertinent Screening History:  Dermatology:  2018- Dermatology check (Dr. Fracisco Spencer) - all benign findings.  2022- Dermatology:  Excision of right lower leg, path, inflamed hypertrophic seborrheic keratosis. Negative for malignancy.   2022- Dermatology check (Dr. Jennifer Stover),    # Neoplasm of uncertain behavior, right abdomen  DDX: dysplastic nevus, rule out melanoma   - Shave biopsy:   # Numerous benign nevi  - She does have numerous irregular nevi, but all have symmetric pigment pattern on dermoscopy  #Lesion to monitor, L breast- 6 mm brown macule with irregular, spokewheel like borders on the left breast. Unchanged for 10-20 years.   1/10/2024: Dermatology check (Dr. Jennifer Stover),  all benign findings  2025:  Dermatology check (Dr. Jennifer Stover),  all benign findings     Pancreatic Screenin2022- MRCP: Unremarkable appearance of the pancreas and biliary tract. No acute or suspicious findings on today's exam.  10/5/2023: MRCP: No significant findings MRI abdomen. Nothing for malignancy. Normal pancreas.  10/18/2024: MRCP, Normal appearance of the pancreas and biliary tract. No acute or suspicious findings.    Past Medical/Surgical History:  Past Medical History:   Diagnosis Date     BRCA1 gene mutation positive 6/15/2018    BRCA1 c.181T>G (p.C61G)  tested at [facility] 2018     Vertigo, benign paroxysmal, bilateral      Past Surgical History:   Procedure Laterality Date     CYSTOSCOPY N/A 2018    Procedure: CYSTOSCOPY;;  Surgeon: Marybeth Grande MD;  Location:  OR     ENT SURGERY      tonsillectomy     HYSTERECTOMY TOTAL ABDOMINAL, BILATERAL SALPINGO-OOPHORECTOMY, NODE DISSECTION, COMBINED Bilateral 2018    Procedure: COMBINED HYSTERECTOMY TOTAL ABDOMINAL, SALPINGO-OOPHORECTOMY, NODE DISSECTION;;  Surgeon: Marybeth Grande MD;  Location:  OR     INSERT TISSUE EXPANDER BREAST BILATERAL Bilateral 2019    Procedure: BILATERAL TISSUE EXPANDERS (GILDARDO);  Surgeon: Emily Gee MD;  Location:  OR     LAPAROSCOPIC HYSTERECTOMY TOTAL, BILATERAL SALPINGO-OOPHORECTOMY, NODE DISSECTION, COMBINED N/A 2018    Procedure: COMBINED LAPAROSCOPIC HYSTERECTOMY TOTAL, SALPINGO-OOPHORECTOMY, NODE DISSECTION;  Laparoscopic Removal Of Uterus, Cervix, Both Ovaries And Fallopian Tubes, Cystoscopy ;  Surgeon: Marybeth Grande MD;  Location:  OR     LAPAROSCOPY DIAGNOSTIC (GYN)  2006    endometriosis     MASTECTOMY SIMPLE BILATERAL Bilateral 2019    Procedure: BILATERAL PROPHYLACTIC MASTECTOMY (JUAN);  Surgeon: Roseann Boyd MD;  Location:  OR     urethral reimplant  age 6       Allergies:  Allergies as of 2025 - Reviewed 2025   Allergen Reaction Noted     Chlorhexidine gluconate Rash 2018       Current Medications:  Current Outpatient Medications   Medication Sig Dispense Refill     estradiol (ESTRACE) 0.5 MG tablet Take 1 tablet (0.5 mg) by mouth daily 90 tablet 3        Family History:  Family History   Problem Relation Age of Onset     Ovarian Cancer Mother 38         age 47     Multiple Sclerosis Father      Alcohol/Drug Brother         active     Alcohol/Drug Brother 41        cause of death     Psychotic Disorder Brother         depression and ADHD     Hereditary Breast and  Ovarian Cancer Syndrome Maternal Aunt 50        Breast cancer gene -- MEHRDAD/BSO and mastectomy     Breast Cancer Maternal Aunt 35        & MC age 36     Uterine Cancer Maternal Aunt      Pancreatic Cancer Maternal Uncle 38     Cerebral aneurysm Maternal Cousin      C.A.D. No family hx of      Diabetes No family hx of      Hypertension No family hx of      Coronary Artery Disease No family hx of      Cerebrovascular Disease No family hx of      Hyperlipidemia No family hx of      Colon Cancer No family hx of      Prostate Cancer No family hx of      Thyroid Disease No family hx of      Skin Cancer No family hx of      Melanoma No family hx of        Social History:  Social History     Socioeconomic History     Marital status: Single     Spouse name: NA     Number of children: 1     Years of education: Not on file     Highest education level: Not on file   Occupational History     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 Not Asked   Social History Narrative    How much exercise per week? One    How much calcium per day? Dairy products       How much caffeine per day? 1 cup coffee and 2 can of coke    How much vitamin D per day? None    Do you/your family wear seatbelts?  Yes    Do you/your family use safety helmets? yes    Do you/your family use sunscreen? Yes    Do you/your family keep firearms in the home? No    Do you/your family have a smoke detector(s)? Yes        Do you feel  safe in your home? Yes    Has anyone ever touched you in an unwanted manner? No     Explain     SEE [facility]     06/16/2014    Beronica Beardon [facility] 04/26/18         Social Drivers of Health     Financial Resource Strain: Not on file   Food Insecurity: Not on file   Transportation Needs: Not on file   Physical Activity: Not on file   Stress: Not on file   Social Connections: Unknown (3/17/2022)    [facility] from [email or web address]    Social Connections      Frequency of Communication with Friends and Family: Not on file   Interpersonal Safety: Not on file   Housing Stability: Not on file       Physical Exam:  [facility] 12/17/2016   GENERAL: alert and no distress  EYES: Eyes grossly normal to inspection.  No discharge or erythema, or obvious scleral/conjunctival abnormalities.  RESP: No audible wheeze, cough, or visible cyanosis.    SKIN: Visible skin clear. No significant rash, abnormal pigmentation or lesions.  NEURO: Cranial nerves grossly intact.  Mentation and speech appropriate for age.  PSYCH: Appropriate affect, tone, and pace of words    ASSESSMENT    Celeste reports that she is doing well at this visit. She has no concerns with her health, and no updates to her family's medical history. She is due for an eye exam, otherwise she is up to date on BRCA1 screenings. She continues to follow with Dr. Harris for pancreatic cancer screening and with Dr. Stover for skin screening. I would like to see her back in one year.     INDIVIDUALIZED CANCER RISK MANAGEMENT PLAN:    Continue pancreatic cancer screening with Dr. Harris[facility]  Continue routine skin and eye exams, next visit with Dr. Stover due in January, 2026  Return to see me in one year        I spent a total of 19 minutes on the day of the visit. Please see the note for further information on patient assessment and treatment.     Kristin Harvey, DNP, APRN, AGCNS-BC  Clinical Nurse Specialist  Cancer Risk  Management Program  [facility]                            Again, thank you for allowing me to participate in the care of your patient.        Sincerely,        ENRIQUE Pimentel CNP    Electronically signed

## 2025-06-16 NOTE — PATIENT INSTRUCTIONS
INDIVIDUALIZED CANCER RISK MANAGEMENT PLAN:  Continue pancreatic cancer screening with Dr. Harris, next MRCP due in October, 2025  Continue routine skin and eye exams, next visit with Dr. Stover due in January, 2026  Return to see me in one year

## 2025-07-12 ENCOUNTER — HEALTH MAINTENANCE LETTER (OUTPATIENT)
Age: 40
End: 2025-07-12

## (undated) DEVICE — DRAPE SHEET MED 44X70" 9355

## (undated) DEVICE — GLOVE PROTEXIS BLUE W/NEU-THERA 6.5  2D73EB65

## (undated) DEVICE — PREP TECHNI-CARE CHLOROXYLENOL 3% 4OZ BOTTLE C222-4ZWO

## (undated) DEVICE — SU PDS II 3-0 SH 27" Z316H

## (undated) DEVICE — SU PDS II 2-0 CT-2 27"  Z333H

## (undated) DEVICE — DRAPE BREAST/CHEST 29420

## (undated) DEVICE — PREP CHLORAPREP 26ML TINTED ORANGE  260815

## (undated) DEVICE — GLOVE PROTEXIS POWDER FREE SMT 6.5  2D72PT65X

## (undated) DEVICE — SOL NACL 0.9% IRRIG 1000ML BOTTLE 2F7124

## (undated) DEVICE — SOL NACL 0.9% INJ 1000ML BAG 2B1324X

## (undated) DEVICE — ENDO TROCAR FIRST ENTRY KII FIOS Z-THRD 05X100MM CTF03

## (undated) DEVICE — JELLY LUBRICATING SURGILUBE 2OZ TUBE

## (undated) DEVICE — SU DERMABOND ADVANCED .7ML DNX12

## (undated) DEVICE — TUBING IRRIG CYSTO/BLADDER SET 81" LF 2C4040

## (undated) DEVICE — ESU ELEC BLADE 2.75" COATED/INSULATED E1455

## (undated) DEVICE — SU VICRYL 0 TIE 54" J608H

## (undated) DEVICE — LINEN TOWEL PACK X6 WHITE 5487

## (undated) DEVICE — ENDO TROCAR SLEEVE KII Z-THREADED 05X100MM CTS02

## (undated) DEVICE — SU VICRYL 4-0 PS-2 18" UND J496H

## (undated) DEVICE — SU VICRYL 3-0 TIE 12X18" J904T

## (undated) DEVICE — NDL INSUFFLATION 13GA 120MM C2201

## (undated) DEVICE — SOL WATER IRRIG 1000ML BOTTLE 2F7114

## (undated) DEVICE — GLOVE PROTEXIS MICRO 6.0  2D73PM60

## (undated) DEVICE — SYR EAR BULB 3OZ 0035830

## (undated) DEVICE — ENDO SCOPE WARMER SEAL  C3101

## (undated) DEVICE — DRAIN JACKSON PRATT RESERVOIR 100ML SU130-1305

## (undated) DEVICE — Device

## (undated) DEVICE — PREP SKIN SCRUB TRAY 4461A

## (undated) DEVICE — DRAIN JACKSON PRATT 15FR ROUND SU130-1323

## (undated) DEVICE — TUBING INFUSION INFILTRATION LIPOSUCTION 156" 24-6008

## (undated) DEVICE — ESU GROUND PAD ADULT W/CORD E7507

## (undated) DEVICE — LINEN TOWEL PACK X30 5481

## (undated) DEVICE — SU MONOCRYL 3-0 PS-2 27" Y427H

## (undated) DEVICE — ENDO SHEARS 5MM 176643

## (undated) DEVICE — SOL NACL 0.9% IRRIG 1000ML BOTTLE 07138-09

## (undated) DEVICE — STPL SKIN 35W ROTATING HEAD PRW35

## (undated) DEVICE — SU SILK 2-0 FSL 18" 677G

## (undated) DEVICE — SPECIMEN TRAP MUCOUS 40ML LUKI C30200A

## (undated) DEVICE — DRSG KERLIX 4 1/2"X4YDS ROLL 6715

## (undated) DEVICE — RETR ELEV / UTERINE MANIPULATOR V-CARE MED CUP 60-6085-201A

## (undated) DEVICE — GLOVE PROTEXIS BLUE W/NEU-THERA 7.0  2D73EB70

## (undated) DEVICE — DEVICE ENDO STITCH APPLIER 10MM 173016

## (undated) DEVICE — ESU PENCIL W/SMOKE EVAC NEPTUNE STRYKER 0703-046-000

## (undated) DEVICE — SU ETHILON 3-0 FS-1 18" 669H

## (undated) DEVICE — SU MONOCRYL 4-0 PS-2 18" UND Y496G

## (undated) DEVICE — PAD CHUX UNDERPAD 23X24" 7136

## (undated) DEVICE — KOH COLPOTOMIZER OCCLUDER  CPO-6

## (undated) DEVICE — SOL NACL 0.9% INJ 1000ML BAG 07983-09

## (undated) DEVICE — KIT PATIENT POSITIONING PIGAZZI LATEX FREE 40580

## (undated) DEVICE — DEVICE SUTURE GRASPER TROCAR CLOSURE 14GA PMITCSG

## (undated) DEVICE — SUCTION IRR STRYKERFLOW II W/TIP 250-070-520

## (undated) DEVICE — BNDG ELASTIC 4" DBL LENGTH UNSTERILE 6611-14

## (undated) DEVICE — SUCTION MANIFOLD DORNOCH ULTRA CART UL-CL500

## (undated) DEVICE — SU WND CLOSURE RELOAD VLOC 180 ABS 0 GREEN 8" VLOCA008L

## (undated) DEVICE — CLIP APPLIER LIGACLIP 11" MED SHORT 20 CT MSM20

## (undated) DEVICE — LINEN TOWEL PACK X5 5464

## (undated) DEVICE — ESU LIGASURE LAPAROSCOPIC BLUNT TIP SEALER 5MMX37CM LF1837

## (undated) DEVICE — WIPES FOLEY CARE SURESTEP PROVON DFC100

## (undated) DEVICE — DRSG XEROFORM 5X9" 8884431605

## (undated) DEVICE — SUCTION CANISTER MEDIVAC LINER 3000ML W/LID 65651-530

## (undated) DEVICE — PACK MAJOR SBA15MAFSI

## (undated) RX ORDER — CEFAZOLIN SODIUM 2 G/100ML
INJECTION, SOLUTION INTRAVENOUS
Status: DISPENSED
Start: 2019-04-08

## (undated) RX ORDER — DIPHENHYDRAMINE HYDROCHLORIDE 50 MG/ML
INJECTION INTRAMUSCULAR; INTRAVENOUS
Status: DISPENSED
Start: 2019-04-08

## (undated) RX ORDER — CEFAZOLIN SODIUM 2 G/100ML
INJECTION, SOLUTION INTRAVENOUS
Status: DISPENSED
Start: 2018-09-12

## (undated) RX ORDER — PROPOFOL 10 MG/ML
INJECTION, EMULSION INTRAVENOUS
Status: DISPENSED
Start: 2019-04-08

## (undated) RX ORDER — GABAPENTIN 300 MG/1
CAPSULE ORAL
Status: DISPENSED
Start: 2018-09-12

## (undated) RX ORDER — CEFAZOLIN SODIUM 1 G/3ML
INJECTION, POWDER, FOR SOLUTION INTRAMUSCULAR; INTRAVENOUS
Status: DISPENSED
Start: 2019-04-08

## (undated) RX ORDER — HEPARIN SODIUM 1000 [USP'U]/ML
INJECTION, SOLUTION INTRAVENOUS; SUBCUTANEOUS
Status: DISPENSED
Start: 2018-09-12

## (undated) RX ORDER — ACETAMINOPHEN 500 MG
TABLET ORAL
Status: DISPENSED
Start: 2019-04-08

## (undated) RX ORDER — NEOSTIGMINE METHYLSULFATE 1 MG/ML
VIAL (ML) INJECTION
Status: DISPENSED
Start: 2019-04-08

## (undated) RX ORDER — SCOLOPAMINE TRANSDERMAL SYSTEM 1 MG/1
PATCH, EXTENDED RELEASE TRANSDERMAL
Status: DISPENSED
Start: 2018-09-12

## (undated) RX ORDER — DEXAMETHASONE SODIUM PHOSPHATE 4 MG/ML
INJECTION, SOLUTION INTRA-ARTICULAR; INTRALESIONAL; INTRAMUSCULAR; INTRAVENOUS; SOFT TISSUE
Status: DISPENSED
Start: 2019-04-08

## (undated) RX ORDER — HEPARIN SODIUM 5000 [USP'U]/.5ML
INJECTION, SOLUTION INTRAVENOUS; SUBCUTANEOUS
Status: DISPENSED
Start: 2018-09-12

## (undated) RX ORDER — ACETAMINOPHEN 500 MG
TABLET ORAL
Status: DISPENSED
Start: 2018-09-12

## (undated) RX ORDER — ONDANSETRON 2 MG/ML
INJECTION INTRAMUSCULAR; INTRAVENOUS
Status: DISPENSED
Start: 2018-09-12

## (undated) RX ORDER — OXYCODONE HCL 10 MG/1
TABLET, FILM COATED, EXTENDED RELEASE ORAL
Status: DISPENSED
Start: 2019-04-08

## (undated) RX ORDER — FENTANYL CITRATE 50 UG/ML
INJECTION, SOLUTION INTRAMUSCULAR; INTRAVENOUS
Status: DISPENSED
Start: 2018-09-12

## (undated) RX ORDER — OXYCODONE HYDROCHLORIDE 5 MG/1
TABLET ORAL
Status: DISPENSED
Start: 2018-09-12

## (undated) RX ORDER — FENTANYL CITRATE 50 UG/ML
INJECTION, SOLUTION INTRAMUSCULAR; INTRAVENOUS
Status: DISPENSED
Start: 2019-04-08

## (undated) RX ORDER — GLYCOPYRROLATE 0.2 MG/ML
INJECTION, SOLUTION INTRAMUSCULAR; INTRAVENOUS
Status: DISPENSED
Start: 2019-04-08

## (undated) RX ORDER — DEXAMETHASONE SODIUM PHOSPHATE 4 MG/ML
INJECTION, SOLUTION INTRA-ARTICULAR; INTRALESIONAL; INTRAMUSCULAR; INTRAVENOUS; SOFT TISSUE
Status: DISPENSED
Start: 2018-09-12

## (undated) RX ORDER — ONDANSETRON 2 MG/ML
INJECTION INTRAMUSCULAR; INTRAVENOUS
Status: DISPENSED
Start: 2019-04-08

## (undated) RX ORDER — LIDOCAINE HYDROCHLORIDE 20 MG/ML
INJECTION, SOLUTION EPIDURAL; INFILTRATION; INTRACAUDAL; PERINEURAL
Status: DISPENSED
Start: 2019-04-08

## (undated) RX ORDER — PHENAZOPYRIDINE HYDROCHLORIDE 200 MG/1
TABLET, FILM COATED ORAL
Status: DISPENSED
Start: 2018-09-12

## (undated) RX ORDER — EPHEDRINE SULFATE 50 MG/ML
INJECTION, SOLUTION INTRAMUSCULAR; INTRAVENOUS; SUBCUTANEOUS
Status: DISPENSED
Start: 2018-09-12

## (undated) RX ORDER — PHENYLEPHRINE HCL IN 0.9% NACL 1 MG/10 ML
SYRINGE (ML) INTRAVENOUS
Status: DISPENSED
Start: 2018-09-12

## (undated) RX ORDER — HYDROMORPHONE HYDROCHLORIDE 1 MG/ML
INJECTION, SOLUTION INTRAMUSCULAR; INTRAVENOUS; SUBCUTANEOUS
Status: DISPENSED
Start: 2018-09-12

## (undated) RX ORDER — LIDOCAINE HYDROCHLORIDE 20 MG/ML
INJECTION, SOLUTION EPIDURAL; INFILTRATION; INTRACAUDAL; PERINEURAL
Status: DISPENSED
Start: 2018-09-12